# Patient Record
Sex: FEMALE | Race: WHITE | NOT HISPANIC OR LATINO
[De-identification: names, ages, dates, MRNs, and addresses within clinical notes are randomized per-mention and may not be internally consistent; named-entity substitution may affect disease eponyms.]

---

## 2017-01-01 ENCOUNTER — RX RENEWAL (OUTPATIENT)
Age: 77
End: 2017-01-01

## 2017-02-08 ENCOUNTER — RX RENEWAL (OUTPATIENT)
Age: 77
End: 2017-02-08

## 2017-02-28 ENCOUNTER — APPOINTMENT (OUTPATIENT)
Dept: ENDOCRINOLOGY | Facility: CLINIC | Age: 77
End: 2017-02-28

## 2017-02-28 VITALS
DIASTOLIC BLOOD PRESSURE: 70 MMHG | WEIGHT: 107.38 LBS | SYSTOLIC BLOOD PRESSURE: 116 MMHG | HEART RATE: 78 BPM | BODY MASS INDEX: 23.49 KG/M2 | HEIGHT: 56.5 IN

## 2017-02-28 LAB
GLUCOSE BLDC GLUCOMTR-MCNC: 193
HBA1C MFR BLD HPLC: 7.1

## 2017-03-27 ENCOUNTER — APPOINTMENT (OUTPATIENT)
Dept: ENDOCRINOLOGY | Facility: CLINIC | Age: 77
End: 2017-03-27

## 2017-07-07 ENCOUNTER — APPOINTMENT (OUTPATIENT)
Dept: ENDOCRINOLOGY | Facility: CLINIC | Age: 77
End: 2017-07-07

## 2017-07-07 VITALS
SYSTOLIC BLOOD PRESSURE: 120 MMHG | DIASTOLIC BLOOD PRESSURE: 80 MMHG | HEART RATE: 70 BPM | BODY MASS INDEX: 24.28 KG/M2 | HEIGHT: 56.5 IN | WEIGHT: 111 LBS

## 2017-07-07 LAB — GLUCOSE BLDC GLUCOMTR-MCNC: 191

## 2017-07-07 RX ORDER — SILVER SULFADIAZINE 10 MG/G
1 CREAM TOPICAL
Qty: 50 | Refills: 0 | Status: ACTIVE | COMMUNITY
Start: 2017-06-06

## 2017-08-07 ENCOUNTER — APPOINTMENT (OUTPATIENT)
Dept: ENDOCRINOLOGY | Facility: CLINIC | Age: 77
End: 2017-08-07
Payer: MEDICARE

## 2017-08-07 PROCEDURE — G0108 DIAB MANAGE TRN  PER INDIV: CPT

## 2017-10-17 ENCOUNTER — APPOINTMENT (OUTPATIENT)
Dept: ENDOCRINOLOGY | Facility: CLINIC | Age: 77
End: 2017-10-17
Payer: MEDICARE

## 2017-10-17 VITALS
SYSTOLIC BLOOD PRESSURE: 118 MMHG | DIASTOLIC BLOOD PRESSURE: 64 MMHG | HEART RATE: 68 BPM | HEIGHT: 56.5 IN | WEIGHT: 103 LBS | BODY MASS INDEX: 22.53 KG/M2

## 2017-10-17 DIAGNOSIS — R20.0 ANESTHESIA OF SKIN: ICD-10-CM

## 2017-10-17 DIAGNOSIS — R20.2 ANESTHESIA OF SKIN: ICD-10-CM

## 2017-10-17 LAB
GLUCOSE BLDC GLUCOMTR-MCNC: 153
HBA1C MFR BLD HPLC: 7.2

## 2017-10-17 PROCEDURE — 82962 GLUCOSE BLOOD TEST: CPT

## 2017-10-17 PROCEDURE — 83036 HEMOGLOBIN GLYCOSYLATED A1C: CPT | Mod: QW

## 2017-10-17 PROCEDURE — 99214 OFFICE O/P EST MOD 30 MIN: CPT | Mod: 25

## 2018-01-16 ENCOUNTER — APPOINTMENT (OUTPATIENT)
Dept: ENDOCRINOLOGY | Facility: CLINIC | Age: 78
End: 2018-01-16
Payer: MEDICARE

## 2018-01-16 VITALS
SYSTOLIC BLOOD PRESSURE: 116 MMHG | DIASTOLIC BLOOD PRESSURE: 60 MMHG | WEIGHT: 109 LBS | HEIGHT: 56.5 IN | BODY MASS INDEX: 23.84 KG/M2 | HEART RATE: 62 BPM

## 2018-01-16 DIAGNOSIS — R32 UNSPECIFIED URINARY INCONTINENCE: ICD-10-CM

## 2018-01-16 LAB — GLUCOSE BLDC GLUCOMTR-MCNC: 141

## 2018-01-16 PROCEDURE — 99214 OFFICE O/P EST MOD 30 MIN: CPT | Mod: 25

## 2018-01-16 PROCEDURE — 82962 GLUCOSE BLOOD TEST: CPT

## 2018-01-16 RX ORDER — NITROFURANTOIN (MONOHYDRATE/MACROCRYSTALS) 25; 75 MG/1; MG/1
100 CAPSULE ORAL
Refills: 0 | Status: DISCONTINUED | COMMUNITY
End: 2018-01-16

## 2018-03-05 ENCOUNTER — APPOINTMENT (OUTPATIENT)
Dept: ENDOCRINOLOGY | Facility: CLINIC | Age: 78
End: 2018-03-05

## 2018-04-17 ENCOUNTER — APPOINTMENT (OUTPATIENT)
Dept: ENDOCRINOLOGY | Facility: CLINIC | Age: 78
End: 2018-04-17
Payer: MEDICARE

## 2018-04-17 ENCOUNTER — LABORATORY RESULT (OUTPATIENT)
Age: 78
End: 2018-04-17

## 2018-04-17 VITALS
SYSTOLIC BLOOD PRESSURE: 161 MMHG | DIASTOLIC BLOOD PRESSURE: 73 MMHG | WEIGHT: 109 LBS | HEART RATE: 56 BPM | HEIGHT: 56.5 IN | BODY MASS INDEX: 23.84 KG/M2

## 2018-04-17 LAB — GLUCOSE BLDC GLUCOMTR-MCNC: 181

## 2018-04-17 PROCEDURE — 99213 OFFICE O/P EST LOW 20 MIN: CPT | Mod: 25

## 2018-04-17 PROCEDURE — 83036 HEMOGLOBIN GLYCOSYLATED A1C: CPT | Mod: QW

## 2018-04-17 PROCEDURE — 82962 GLUCOSE BLOOD TEST: CPT

## 2018-04-17 RX ORDER — BLOOD-GLUCOSE METER
W/DEVICE KIT MISCELLANEOUS
Qty: 1 | Refills: 0 | Status: DISCONTINUED | COMMUNITY
Start: 2018-04-17 | End: 2018-04-17

## 2018-04-19 LAB
25(OH)D3 SERPL-MCNC: 42.5 NG/ML
ALBUMIN SERPL ELPH-MCNC: 4.3 G/DL
ALP BLD-CCNC: 83 U/L
ALT SERPL-CCNC: 33 U/L
ANION GAP SERPL CALC-SCNC: 11 MMOL/L
APPEARANCE: CLEAR
AST SERPL-CCNC: 46 U/L
BACTERIA UR CULT: NORMAL
BASOPHILS # BLD AUTO: 0.03 K/UL
BASOPHILS NFR BLD AUTO: 0.4 %
BILIRUB SERPL-MCNC: 0.8 MG/DL
BILIRUBIN URINE: NEGATIVE
BLOOD URINE: NEGATIVE
BUN SERPL-MCNC: 13 MG/DL
CALCIUM SERPL-MCNC: 8.8 MG/DL
CHLORIDE SERPL-SCNC: 101 MMOL/L
CHOLEST SERPL-MCNC: 178 MG/DL
CHOLEST/HDLC SERPL: 4.8 RATIO
CO2 SERPL-SCNC: 25 MMOL/L
COLOR: YELLOW
CREAT SERPL-MCNC: 0.62 MG/DL
CREAT SPEC-SCNC: 46 MG/DL
EOSINOPHIL # BLD AUTO: 0.18 K/UL
EOSINOPHIL NFR BLD AUTO: 2.5 %
FOLATE SERPL-MCNC: 14.1 NG/ML
GLUCOSE QUALITATIVE U: NEGATIVE MG/DL
GLUCOSE SERPL-MCNC: 151 MG/DL
HCT VFR BLD CALC: 37.3 %
HDLC SERPL-MCNC: 37 MG/DL
HGB BLD-MCNC: 11.3 G/DL
IMM GRANULOCYTES NFR BLD AUTO: 0.1 %
KETONES URINE: NEGATIVE
LDLC SERPL CALC-MCNC: 118 MG/DL
LEUKOCYTE ESTERASE URINE: ABNORMAL
LYMPHOCYTES # BLD AUTO: 2.05 K/UL
LYMPHOCYTES NFR BLD AUTO: 29 %
MAN DIFF?: NORMAL
MCHC RBC-ENTMCNC: 25.9 PG
MCHC RBC-ENTMCNC: 30.3 GM/DL
MCV RBC AUTO: 85.6 FL
MICROALBUMIN 24H UR DL<=1MG/L-MCNC: 0.3 MG/DL
MICROALBUMIN/CREAT 24H UR-RTO: 7 MG/G
MONOCYTES # BLD AUTO: 0.41 K/UL
MONOCYTES NFR BLD AUTO: 5.8 %
NEUTROPHILS # BLD AUTO: 4.4 K/UL
NEUTROPHILS NFR BLD AUTO: 62.2 %
NITRITE URINE: POSITIVE
PH URINE: 6
PLATELET # BLD AUTO: 329 K/UL
POTASSIUM SERPL-SCNC: 4.8 MMOL/L
PROT SERPL-MCNC: 7.5 G/DL
PROTEIN URINE: NEGATIVE MG/DL
RBC # BLD: 4.36 M/UL
RBC # FLD: 15.2 %
SODIUM SERPL-SCNC: 137 MMOL/L
SPECIFIC GRAVITY URINE: 1.02
TRIGL SERPL-MCNC: 116 MG/DL
UROBILINOGEN URINE: 1 MG/DL
VIT B12 SERPL-MCNC: >2000 PG/ML
WBC # FLD AUTO: 7.08 K/UL

## 2018-04-24 ENCOUNTER — MEDICATION RENEWAL (OUTPATIENT)
Age: 78
End: 2018-04-24

## 2018-04-24 RX ORDER — BLOOD-GLUCOSE METER
W/DEVICE EACH MISCELLANEOUS
Qty: 1 | Refills: 0 | Status: ACTIVE | COMMUNITY
Start: 2018-04-24 | End: 1900-01-01

## 2018-04-24 RX ORDER — BLOOD-GLUCOSE METER
EACH MISCELLANEOUS
Qty: 1 | Refills: 0 | Status: DISCONTINUED | COMMUNITY
Start: 2018-04-17 | End: 2018-04-24

## 2018-04-24 RX ORDER — BLOOD SUGAR DIAGNOSTIC
STRIP MISCELLANEOUS
Qty: 200 | Refills: 5 | Status: ACTIVE | COMMUNITY
Start: 2018-04-24 | End: 1900-01-01

## 2018-05-01 ENCOUNTER — APPOINTMENT (OUTPATIENT)
Dept: ENDOCRINOLOGY | Facility: CLINIC | Age: 78
End: 2018-05-01
Payer: MEDICARE

## 2018-05-01 VITALS
SYSTOLIC BLOOD PRESSURE: 115 MMHG | BODY MASS INDEX: 23.3 KG/M2 | DIASTOLIC BLOOD PRESSURE: 62 MMHG | WEIGHT: 108 LBS | HEIGHT: 57 IN | OXYGEN SATURATION: 99 % | HEART RATE: 66 BPM

## 2018-05-01 LAB — GLUCOSE BLDC GLUCOMTR-MCNC: 305

## 2018-05-01 PROCEDURE — 95250 CONT GLUC MNTR PHYS/QHP EQP: CPT

## 2018-05-01 PROCEDURE — 95251 CONT GLUC MNTR ANALYSIS I&R: CPT

## 2018-05-01 PROCEDURE — 99214 OFFICE O/P EST MOD 30 MIN: CPT | Mod: 25

## 2018-05-09 ENCOUNTER — APPOINTMENT (OUTPATIENT)
Dept: ENDOCRINOLOGY | Facility: CLINIC | Age: 78
End: 2018-05-09
Payer: MEDICARE

## 2018-05-09 PROCEDURE — G0108 DIAB MANAGE TRN  PER INDIV: CPT

## 2018-05-09 RX ORDER — ALCOHOL ANTISEPTIC PADS
PADS, MEDICATED (EA) TOPICAL
Qty: 1 | Refills: 10 | Status: DISCONTINUED | COMMUNITY
Start: 2018-05-09 | End: 2018-05-09

## 2018-06-25 ENCOUNTER — RX RENEWAL (OUTPATIENT)
Age: 78
End: 2018-06-25

## 2018-06-26 ENCOUNTER — APPOINTMENT (OUTPATIENT)
Dept: ENDOCRINOLOGY | Facility: CLINIC | Age: 78
End: 2018-06-26
Payer: MEDICARE

## 2018-06-26 VITALS
HEART RATE: 56 BPM | WEIGHT: 108 LBS | SYSTOLIC BLOOD PRESSURE: 126 MMHG | OXYGEN SATURATION: 97 % | DIASTOLIC BLOOD PRESSURE: 73 MMHG | HEIGHT: 57.5 IN | BODY MASS INDEX: 22.98 KG/M2

## 2018-06-26 DIAGNOSIS — G25.81 RESTLESS LEGS SYNDROME: ICD-10-CM

## 2018-06-26 LAB
GLUCOSE BLDC GLUCOMTR-MCNC: 177
HBA1C MFR BLD HPLC: 6.9

## 2018-06-26 PROCEDURE — 82962 GLUCOSE BLOOD TEST: CPT

## 2018-06-26 PROCEDURE — 99214 OFFICE O/P EST MOD 30 MIN: CPT | Mod: 25

## 2018-06-26 PROCEDURE — 83036 HEMOGLOBIN GLYCOSYLATED A1C: CPT | Mod: QW

## 2018-08-13 ENCOUNTER — RX RENEWAL (OUTPATIENT)
Age: 78
End: 2018-08-13

## 2018-10-04 ENCOUNTER — APPOINTMENT (OUTPATIENT)
Dept: ENDOCRINOLOGY | Facility: CLINIC | Age: 78
End: 2018-10-04
Payer: MEDICARE

## 2018-10-04 VITALS
BODY MASS INDEX: 23.51 KG/M2 | HEART RATE: 66 BPM | OXYGEN SATURATION: 97 % | WEIGHT: 109 LBS | DIASTOLIC BLOOD PRESSURE: 75 MMHG | HEIGHT: 57 IN | SYSTOLIC BLOOD PRESSURE: 161 MMHG

## 2018-10-04 LAB — GLUCOSE BLDC GLUCOMTR-MCNC: 187

## 2018-10-04 PROCEDURE — 82962 GLUCOSE BLOOD TEST: CPT

## 2018-10-04 PROCEDURE — 99214 OFFICE O/P EST MOD 30 MIN: CPT | Mod: 25

## 2018-12-22 ENCOUNTER — MOBILE ON CALL (OUTPATIENT)
Age: 78
End: 2018-12-22

## 2019-01-07 ENCOUNTER — APPOINTMENT (OUTPATIENT)
Dept: ENDOCRINOLOGY | Facility: CLINIC | Age: 79
End: 2019-01-07
Payer: MEDICARE

## 2019-01-07 VITALS
BODY MASS INDEX: 21.64 KG/M2 | WEIGHT: 100 LBS | DIASTOLIC BLOOD PRESSURE: 55 MMHG | SYSTOLIC BLOOD PRESSURE: 107 MMHG | HEART RATE: 54 BPM

## 2019-01-07 VITALS
DIASTOLIC BLOOD PRESSURE: 55 MMHG | SYSTOLIC BLOOD PRESSURE: 107 MMHG | HEART RATE: 54 BPM | WEIGHT: 100 LBS | BODY MASS INDEX: 21.64 KG/M2

## 2019-01-07 DIAGNOSIS — D64.9 ANEMIA, UNSPECIFIED: ICD-10-CM

## 2019-01-07 LAB
GLUCOSE BLDC GLUCOMTR-MCNC: 115
HBA1C MFR BLD HPLC: 6.9

## 2019-01-07 PROCEDURE — 83036 HEMOGLOBIN GLYCOSYLATED A1C: CPT | Mod: QW

## 2019-01-07 PROCEDURE — 82962 GLUCOSE BLOOD TEST: CPT

## 2019-01-07 PROCEDURE — 99214 OFFICE O/P EST MOD 30 MIN: CPT | Mod: 25

## 2019-01-07 NOTE — ASSESSMENT
[FreeTextEntry1] : Again we discussed how she is doing very well in her diabetic management.She has lost some weight since her  passed away and she has been feeling more anxious. We did discuss possibly having a part-time Aid at home. We will arrange for her to have a home draw for fasting labs. i have suggested she routinely have a  nighttime snack.  [Carbohydrate Consistent Diet] : carbohydrate consistent diet [Hypoglycemia Management] : hypoglycemia management [Diabetes Foot Care] : diabetes foot care [Importance of Diet and Exercise] : importance of diet and exercise to improve glycemic control, achieve weight loss and improve cardiovascular health [Self Monitoring of Blood Glucose] : self monitoring of blood glucose

## 2019-01-07 NOTE — HISTORY OF PRESENT ILLNESS
[FreeTextEntry1] : Patient with a history of post pancreatic surgery and type 1 diabetes. She is presently using Lantus 10 u daily and Novolog sliding scale before meals. She reports occasionally feeling shaky but has not checked her sugars during those times. She does report some low fasting blood sugar readings but denies any symptoms at those times. Her A1C in now 6.9. her  recently passed away and she has lost weight.   She continues to see a rheumatologist, who is giving her Prolia injections. ( however she admits to not going back for awhile). ).She continues to feel weak and uses a walker to ambulate. \par \par \par \par \par \par \par \par \par \par \par \par \par \par \par \par \par \par \par \par

## 2019-01-07 NOTE — PHYSICAL EXAM
[Alert] : alert [No Acute Distress] : no acute distress [Well Nourished] : well nourished [Normal Sclera/Conjunctiva] : normal sclera/conjunctiva [PERRL] : pupils equal, round and reactive to light [EOMI] : extra ocular movement intact [Normal Oropharynx] : the oropharynx was normal [Thyroid Not Enlarged] : the thyroid was not enlarged [Clear to Auscultation] : lungs were clear to auscultation bilaterally [Normal Rate] : heart rate was normal  [Normal S1, S2] : normal S1 and S2 [Regular Rhythm] : with a regular rhythm [Normal Bowel Sounds] : normal bowel sounds [Not Tender] : non-tender [No CVA Tenderness] : no ~M costovertebral angle tenderness [No Joint Swelling] : no joint swelling seen [No Rash] : no rash [Normal Reflexes] : deep tendon reflexes were 2+ and symmetric [No Tremors] : no tremors [Oriented x3] : oriented to person, place, and time [de-identified] : Overall seems weaker and more confused [de-identified] : Trace edema in both ankles [de-identified] : scars over abdomen [de-identified] : Using a walker, decreased muscle strength of the legs as noted by her trying to get on the examining table

## 2019-01-12 ENCOUNTER — RX RENEWAL (OUTPATIENT)
Age: 79
End: 2019-01-12

## 2019-03-09 ENCOUNTER — MEDICATION RENEWAL (OUTPATIENT)
Age: 79
End: 2019-03-09

## 2019-04-09 ENCOUNTER — APPOINTMENT (OUTPATIENT)
Dept: ENDOCRINOLOGY | Facility: CLINIC | Age: 79
End: 2019-04-09

## 2019-06-25 ENCOUNTER — MOBILE ON CALL (OUTPATIENT)
Age: 79
End: 2019-06-25

## 2019-10-03 ENCOUNTER — MEDICATION RENEWAL (OUTPATIENT)
Age: 79
End: 2019-10-03

## 2019-10-04 RX ORDER — BLOOD SUGAR DIAGNOSTIC
STRIP MISCELLANEOUS
Qty: 200 | Refills: 5 | Status: ACTIVE | COMMUNITY
Start: 2019-01-12 | End: 1900-01-01

## 2019-10-23 ENCOUNTER — APPOINTMENT (OUTPATIENT)
Dept: ENDOCRINOLOGY | Facility: CLINIC | Age: 79
End: 2019-10-23
Payer: MEDICARE

## 2019-10-23 VITALS
BODY MASS INDEX: 20.71 KG/M2 | HEART RATE: 91 BPM | WEIGHT: 96 LBS | OXYGEN SATURATION: 95 % | HEIGHT: 57 IN | SYSTOLIC BLOOD PRESSURE: 104 MMHG | DIASTOLIC BLOOD PRESSURE: 69 MMHG

## 2019-10-23 DIAGNOSIS — F32.9 ANXIETY DISORDER, UNSPECIFIED: ICD-10-CM

## 2019-10-23 DIAGNOSIS — F41.9 ANXIETY DISORDER, UNSPECIFIED: ICD-10-CM

## 2019-10-23 LAB
GLUCOSE BLDC GLUCOMTR-MCNC: 408
HBA1C MFR BLD HPLC: 8.7

## 2019-10-23 PROCEDURE — 83036 HEMOGLOBIN GLYCOSYLATED A1C: CPT | Mod: QW

## 2019-10-23 PROCEDURE — 99215 OFFICE O/P EST HI 40 MIN: CPT | Mod: 25

## 2019-10-23 PROCEDURE — 82962 GLUCOSE BLOOD TEST: CPT

## 2019-10-23 RX ORDER — ATORVASTATIN CALCIUM 80 MG/1
80 TABLET, FILM COATED ORAL
Refills: 0 | Status: ACTIVE | COMMUNITY

## 2019-10-23 RX ORDER — OXYBUTYNIN CHLORIDE 5 MG/1
5 TABLET, EXTENDED RELEASE ORAL
Refills: 0 | Status: DISCONTINUED | COMMUNITY
End: 2019-10-23

## 2019-10-23 RX ORDER — MULTIVIT-MIN/IRON FUM/FOLIC AC 7.5 MG-4
TABLET ORAL
Refills: 0 | Status: ACTIVE | COMMUNITY

## 2019-10-23 RX ORDER — CALCIUM CARBONATE 500(1250)
500 TABLET ORAL
Refills: 0 | Status: ACTIVE | COMMUNITY

## 2019-10-23 RX ORDER — TICAGRELOR 90 MG/1
90 TABLET ORAL
Refills: 0 | Status: ACTIVE | COMMUNITY

## 2019-10-23 RX ORDER — SERTRALINE 25 MG/1
25 TABLET, FILM COATED ORAL
Refills: 0 | Status: ACTIVE | COMMUNITY

## 2019-10-23 NOTE — HISTORY OF PRESENT ILLNESS
[FreeTextEntry1] : Patient with a history of post pancreatic surgery and type 1 diabetes. She presents to the office with her daughters and HHA after 9 months. She was living with her son in [facility] for 8 months where she was seeing another Endocrinologist. Her Lantus was lowered to 6 u. She was also given a different sliding scale for her Humalog, they were instructed to check the sugar after she ate and was told to give it after meals. Her A1C is 8.7, which is high compared to what she usually used to run. She continues to feel shaky. \par She had a blood test with her PCP last week. Her daughter will have them fax it over. \par Today her BS is 408. She had eaten over 3 hours ago and took insulin. A log shows her BS have been running very high. \par Patient is now using a wheelchair.

## 2019-10-23 NOTE — PHYSICAL EXAM
[Alert] : alert [No Acute Distress] : no acute distress [Normal Sclera/Conjunctiva] : normal sclera/conjunctiva [PERRL] : pupils equal, round and reactive to light [Normal Outer Ear/Nose] : the ears and nose were normal in appearance [Normal Hearing] : hearing was normal [No Neck Mass] : no neck mass was observed [Supple] : the neck was supple [Thyroid Not Enlarged] : the thyroid was not enlarged [Normal Rate] : heart rate was normal  [Normal S1, S2] : normal S1 and S2 [Regular Rhythm] : with a regular rhythm [Normal Bowel Sounds] : normal bowel sounds [Not Tender] : non-tender [No Edema] : there was no peripheral edema [Soft] : abdomen soft [No Rash] : no rash [No Skin Lesions] : no skin lesions [Cranial Nerves Intact] : cranial nerves 2-12 were intact [Normal Reflexes] : deep tendon reflexes were 2+ and symmetric [de-identified] : umbilical hernia noted

## 2019-10-23 NOTE — ASSESSMENT
[Glucagon Use] : glucagon use [Hypoglycemia Management] : hypoglycemia management [Carbohydrate Consistent Diet] : carbohydrate consistent diet [Diabetes Foot Care] : diabetes foot care [Long Term Vascular Complications] : long term vascular complications of diabetes [Action and use of Insulin] : action and use of short and long-acting insulin [Importance of Diet and Exercise] : importance of diet and exercise to improve glycemic control, achieve weight loss and improve cardiovascular health [Self Monitoring of Blood Glucose] : self monitoring of blood glucose [Insulin Self-Administration] : insulin self-administration [Injection Technique, Storage, Sharps Disposal] : injection technique, storage, and sharps disposal [Retinopathy Screening] : Patient was referred to ophthalmology for retinopathy screening [FreeTextEntry1] : Patient with moderately controlled type 1 diabetes. She will resume her old regimen. Lantus 10 u and Humalog sliding scale. We spoke about the proper way to check a blood sugar and dose insulin. We discussed the dangers of not doing it this. THey will keep a close eye on her BS. We did discuss a CGM but the family would like to hold off for now. \par A lot of time was spent educating the patient's caregivers. This is all new to them as the patient used to previously manage her own insulin. They had many questions regarding insulin administration and diabetes. \par \par

## 2019-11-12 ENCOUNTER — APPOINTMENT (OUTPATIENT)
Dept: ENDOCRINOLOGY | Facility: CLINIC | Age: 79
End: 2019-11-12
Payer: MEDICARE

## 2019-11-12 VITALS
SYSTOLIC BLOOD PRESSURE: 118 MMHG | HEIGHT: 57 IN | HEART RATE: 60 BPM | DIASTOLIC BLOOD PRESSURE: 59 MMHG | WEIGHT: 96 LBS | OXYGEN SATURATION: 91 % | BODY MASS INDEX: 20.71 KG/M2

## 2019-11-12 LAB — GLUCOSE BLDC GLUCOMTR-MCNC: 189

## 2019-11-12 PROCEDURE — 82962 GLUCOSE BLOOD TEST: CPT

## 2019-11-12 PROCEDURE — 99214 OFFICE O/P EST MOD 30 MIN: CPT | Mod: 25

## 2019-11-12 RX ORDER — LISINOPRIL 2.5 MG/1
2.5 TABLET ORAL
Refills: 0 | Status: DISCONTINUED | COMMUNITY
End: 2019-11-12

## 2019-11-21 NOTE — PHYSICAL EXAM
[Alert] : alert [No Acute Distress] : no acute distress [Normal Sclera/Conjunctiva] : normal sclera/conjunctiva [PERRL] : pupils equal, round and reactive to light [Normal Outer Ear/Nose] : the ears and nose were normal in appearance [Normal Hearing] : hearing was normal [No Neck Mass] : no neck mass was observed [Supple] : the neck was supple [Thyroid Not Enlarged] : the thyroid was not enlarged [Normal Rate] : heart rate was normal  [Normal S1, S2] : normal S1 and S2 [Regular Rhythm] : with a regular rhythm [No Edema] : there was no peripheral edema [Normal Bowel Sounds] : normal bowel sounds [Soft] : abdomen soft [Not Tender] : non-tender [No Rash] : no rash [No Skin Lesions] : no skin lesions [Cranial Nerves Intact] : cranial nerves 2-12 were intact [Normal Reflexes] : deep tendon reflexes were 2+ and symmetric [de-identified] : umbilical hernia noted

## 2019-11-21 NOTE — ASSESSMENT
[Carbohydrate Consistent Diet] : carbohydrate consistent diet [Long Term Vascular Complications] : long term vascular complications of diabetes [Diabetes Foot Care] : diabetes foot care [Hypoglycemia Management] : hypoglycemia management [Action and use of Insulin] : action and use of short and long-acting insulin [Importance of Diet and Exercise] : importance of diet and exercise to improve glycemic control, achieve weight loss and improve cardiovascular health [Self Monitoring of Blood Glucose] : self monitoring of blood glucose [Insulin Self-Administration] : insulin self-administration [Retinopathy Screening] : Patient was referred to ophthalmology for retinopathy screening [Injection Technique, Storage, Sharps Disposal] : injection technique, storage, and sharps disposal [FreeTextEntry1] : Patient with moderately controlled type 1 diabetes. They will increase to Lantus 10 u and continue the Humalog sliding scale. A professional sensor was placed on the patient to help evaluate her BS and insulin demand. In addition, the patient will evaluate whether she likes wearing a CGM. If she does we will order her, her own sensor. \par A lot of time was spent educating the patient's caregivers. This is all new to them as the patient used to previously manage her own insulin. They had many questions regarding insulin administration and diabetes. \par \par

## 2019-11-21 NOTE — HISTORY OF PRESENT ILLNESS
[FreeTextEntry1] : Patient with a history of post pancreatic surgery and type 1 diabetes. She presents to the office with her daughters and HHA after 9 months. She was living with her son in [facility] for 8 months where she was seeing another Endocrinologist. Her Lantus was lowered to 6 u. She was also given a different sliding scale for her Humalog, they were instructed to check the sugar after she ate and was told to give it after meals. Her A1C is 8.7, which is high compared to what she usually used to run. She continues to feel shaky. \par Patient is now using a wheelchair. \par \par Since her last visit, she is now on Lantus 8 u and using the old Humalog scale. THey have been checking her BS before meals. Her BS seem to be improving but at times still remain high. \par \par Patient does have dementia and is presently living and being cared for by family. Her daughter Fuad ([phone]) is the person to speak to regarding all matters.

## 2019-11-26 ENCOUNTER — APPOINTMENT (OUTPATIENT)
Dept: ENDOCRINOLOGY | Facility: CLINIC | Age: 79
End: 2019-11-26
Payer: MEDICARE

## 2019-11-26 VITALS
HEART RATE: 65 BPM | WEIGHT: 95 LBS | SYSTOLIC BLOOD PRESSURE: 140 MMHG | HEIGHT: 57 IN | BODY MASS INDEX: 20.49 KG/M2 | DIASTOLIC BLOOD PRESSURE: 67 MMHG

## 2019-11-26 LAB — GLUCOSE BLDC GLUCOMTR-MCNC: 164

## 2019-11-26 PROCEDURE — 95251 CONT GLUC MNTR ANALYSIS I&R: CPT

## 2019-11-26 PROCEDURE — 95250 CONT GLUC MNTR PHYS/QHP EQP: CPT

## 2019-11-26 PROCEDURE — 99214 OFFICE O/P EST MOD 30 MIN: CPT | Mod: 25

## 2019-11-26 NOTE — REASON FOR VISIT
[Formal Caregiver] : formal caregiver [Follow-Up: _____] : a [unfilled] follow-up visit [Family Member] : family member

## 2019-12-09 ENCOUNTER — MEDICATION RENEWAL (OUTPATIENT)
Age: 79
End: 2019-12-09

## 2019-12-10 NOTE — PHYSICAL EXAM
[Alert] : alert [Normal Sclera/Conjunctiva] : normal sclera/conjunctiva [No Acute Distress] : no acute distress [Normal Outer Ear/Nose] : the ears and nose were normal in appearance [Normal Hearing] : hearing was normal [PERRL] : pupils equal, round and reactive to light [No Neck Mass] : no neck mass was observed [Thyroid Not Enlarged] : the thyroid was not enlarged [Supple] : the neck was supple [Normal Rate] : heart rate was normal  [Normal S1, S2] : normal S1 and S2 [Regular Rhythm] : with a regular rhythm [No Edema] : there was no peripheral edema [Normal Bowel Sounds] : normal bowel sounds [Not Tender] : non-tender [Soft] : abdomen soft [No Skin Lesions] : no skin lesions [No Rash] : no rash [Cranial Nerves Intact] : cranial nerves 2-12 were intact [Normal Reflexes] : deep tendon reflexes were 2+ and symmetric [de-identified] : umbilical hernia noted

## 2019-12-10 NOTE — ASSESSMENT
[Carbohydrate Consistent Diet] : carbohydrate consistent diet [Hypoglycemia Management] : hypoglycemia management [Glucagon Use] : glucagon use [Importance of Diet and Exercise] : importance of diet and exercise to improve glycemic control, achieve weight loss and improve cardiovascular health [Long Term Vascular Complications] : long term vascular complications of diabetes [Diabetes Foot Care] : diabetes foot care [Action and use of Insulin] : action and use of short and long-acting insulin [Self Monitoring of Blood Glucose] : self monitoring of blood glucose [Injection Technique, Storage, Sharps Disposal] : injection technique, storage, and sharps disposal [Insulin Self-Administration] : insulin self-administration [FreeTextEntry1] : Patient with moderately controlled type 1 diabetes. The sensor tracing reveals she is in target range 43%. She is above target 40% of which 15% is above 250. She is below 70, 17% of which 7% is below 54. Her CV is 53.7%. She was having overnight lows but once Lantus was lowered they improved. Her BS during the day are significantly high due to the carb content of her meals. I have explained that when she eats a larger carb meal they can give her an extra unit of insulin. A personal sensor was ordered, once received the family will call to schedule an appt to be trained. \par A lot of time was spent educating the patient's caregivers. This is all new to them as the patient used to previously manage her own insulin. They had many questions regarding insulin administration and diabetes. \par \par

## 2020-02-11 ENCOUNTER — APPOINTMENT (OUTPATIENT)
Dept: ENDOCRINOLOGY | Facility: CLINIC | Age: 80
End: 2020-02-11
Payer: MEDICARE

## 2020-02-11 VITALS — SYSTOLIC BLOOD PRESSURE: 121 MMHG | DIASTOLIC BLOOD PRESSURE: 71 MMHG | HEART RATE: 73 BPM | OXYGEN SATURATION: 94 %

## 2020-02-11 LAB
GLUCOSE BLDC GLUCOMTR-MCNC: 447
HBA1C MFR BLD HPLC: 7.9

## 2020-02-11 PROCEDURE — 83036 HEMOGLOBIN GLYCOSYLATED A1C: CPT | Mod: QW

## 2020-02-11 PROCEDURE — 82962 GLUCOSE BLOOD TEST: CPT

## 2020-02-11 PROCEDURE — 99214 OFFICE O/P EST MOD 30 MIN: CPT | Mod: 25

## 2020-02-11 RX ORDER — GLUCAGON 3 MG/1
3 POWDER NASAL
Qty: 1 | Refills: 2 | Status: ACTIVE | COMMUNITY
Start: 2020-02-11 | End: 1900-01-01

## 2020-02-11 NOTE — ASSESSMENT
[Carbohydrate Consistent Diet] : carbohydrate consistent diet [Hypoglycemia Management] : hypoglycemia management [Diabetes Foot Care] : diabetes foot care [Long Term Vascular Complications] : long term vascular complications of diabetes [Self Monitoring of Blood Glucose] : self monitoring of blood glucose [Importance of Diet and Exercise] : importance of diet and exercise to improve glycemic control, achieve weight loss and improve cardiovascular health [Retinopathy Screening] : Patient was referred to ophthalmology for retinopathy screening [FreeTextEntry1] : Type 1 diabetes. Basically her sensor shows low in am and high during the day. \par Sensor: TIR 45 % over 250 37 % low 7 % mostly over night ; will try to take Lantus 8 u closer to bedtime or consider change to Tresiba or am Lantus. \par continue Humalog scale but do before meals, and no lower than 5 u in am. Will start to factor in rate of change arrows, if rapidly rising add 1-2 u or falling subtract 1- 2u. \par Discussed with daughter and daughter in law. \par Will call for recent BD report.

## 2020-02-11 NOTE — PHYSICAL EXAM
[Alert] : alert [Normal Sclera/Conjunctiva] : normal sclera/conjunctiva [Well Nourished] : well nourished [No Acute Distress] : no acute distress [PERRL] : pupils equal, round and reactive to light [Normal Oropharynx] : the oropharynx was normal [EOMI] : extra ocular movement intact [Thyroid Not Enlarged] : the thyroid was not enlarged [Clear to Auscultation] : lungs were clear to auscultation bilaterally [Normal S1, S2] : normal S1 and S2 [Normal Rate] : heart rate was normal  [Regular Rhythm] : with a regular rhythm [Normal Bowel Sounds] : normal bowel sounds [Not Tender] : non-tender [No CVA Tenderness] : no ~M costovertebral angle tenderness [No Joint Swelling] : no joint swelling seen [No Rash] : no rash [No Tremors] : no tremors [Normal Reflexes] : deep tendon reflexes were 2+ and symmetric [Oriented x3] : oriented to person, place, and time [de-identified] : Overall seems weaker and more confused [de-identified] : Trace edema in both ankles [de-identified] : scars over abdomen [de-identified] : Using a walker, decreased muscle strength of the legs as noted by her trying to get on the examining table

## 2020-02-11 NOTE — HISTORY OF PRESENT ILLNESS
[FreeTextEntry1] : Patient with a history of post pancreatic surgery and type 1 diabetes. She is presently using Lantus 8 u daily and Humalog  sliding scale before meals. She is now wearing a Dennis sensor. \par She was seeing a rheumatologist, who was giving her Prolia injections but now receives it from her PCP. . She is now in a wheel chair. \par \par \par \par \par \par \par \par \par \par \par \par \par \par \par \par \par

## 2020-03-17 ENCOUNTER — APPOINTMENT (OUTPATIENT)
Dept: ENDOCRINOLOGY | Facility: CLINIC | Age: 80
End: 2020-03-17

## 2020-04-12 ENCOUNTER — RX RENEWAL (OUTPATIENT)
Age: 80
End: 2020-04-12

## 2020-06-17 ENCOUNTER — RX RENEWAL (OUTPATIENT)
Age: 80
End: 2020-06-17

## 2020-07-27 ENCOUNTER — RX RENEWAL (OUTPATIENT)
Age: 80
End: 2020-07-27

## 2020-08-19 ENCOUNTER — APPOINTMENT (OUTPATIENT)
Dept: ENDOCRINOLOGY | Facility: CLINIC | Age: 80
End: 2020-08-19
Payer: MEDICARE

## 2020-08-19 PROCEDURE — 99213 OFFICE O/P EST LOW 20 MIN: CPT | Mod: 95

## 2020-08-19 NOTE — ASSESSMENT
[FreeTextEntry1] : Type 1 diabetes.sensor data not available today. Overall sugars seem stable as per daughter She has labs at PCP. \par Will call back when have data from sensor and labs.  [Long Term Vascular Complications] : long term vascular complications of diabetes [Diabetes Foot Care] : diabetes foot care [Carbohydrate Consistent Diet] : carbohydrate consistent diet [Hypoglycemia Management] : hypoglycemia management [Retinopathy Screening] : Patient was referred to ophthalmology for retinopathy screening [Self Monitoring of Blood Glucose] : self monitoring of blood glucose [Diabetic Medications] : Risks and benefits of diabetic medications were discussed

## 2020-08-19 NOTE — HISTORY OF PRESENT ILLNESS
[Home] : at home, [unfilled] , at the time of the visit. [Medical Office: ([facility])___] : at the medical office located in  [Family Member] : family member [Verbal consent obtained from patient] : the patient, [unfilled] [FreeTextEntry3] : daughter [FreeTextEntry1] : Patient with a history of post pancreatic surgery and type 1 diabetes. She is presently using Lantus 8 u daily and Humalog  sliding scale before meals. She is now wearing a Dennis sensor. \par She was seeing a rheumatologist, who was giving her Prolia injections but now receives it from her PCP. . She is now in a wheel chair. \par \par \par \par \par \par \par \par \par \par \par \par \par \par \par \par \par

## 2020-08-19 NOTE — PHYSICAL EXAM
[Alert] : alert [Normal Voice/Communication] : normal voice communication [No Acute Distress] : no acute distress [Normal Sclera/Conjunctiva] : normal sclera/conjunctiva [No Respiratory Distress] : no respiratory distress [Thyroid Not Enlarged] : the thyroid was not enlarged [Oriented x3] : oriented to person, place, and time [Normal Insight/Judgement] : insight and judgment were intact [de-identified] : .covid

## 2020-10-27 ENCOUNTER — APPOINTMENT (OUTPATIENT)
Dept: ENDOCRINOLOGY | Facility: CLINIC | Age: 80
End: 2020-10-27
Payer: MEDICARE

## 2020-10-27 PROCEDURE — 99213 OFFICE O/P EST LOW 20 MIN: CPT | Mod: 25,95

## 2020-10-27 NOTE — HISTORY OF PRESENT ILLNESS
[Home] : at home, [unfilled] , at the time of the visit. [Medical Office: ([facility])___] : at the medical office located in  [Family Member] : family member [Formal Caregiver] : formal caregiver [Verbal consent obtained from patient] : the patient, [unfilled] [FreeTextEntry1] : Patient with a history of post pancreatic surgery and type 1 diabetes. She is presently using Lantus 8 u daily and Humalog  sliding scale before meals. She is now wearing a Dennis sensor.  Mostly she takes 6 u of Humalog at BF since always high by lunch. She can drop low overnight so they lowered Lantus to 6 u. \par She was seeing a rheumatologist, who was giving her Prolia injections but now receives it from her PCP. . She is now in a wheel chair. \par According to daughter she mostly eats carbs hard to change her. \par \par \par \par \par \par \par \par \par \par \par \par \par \par \par \par  [Continuous Glucose Monitoring] : Continuous Glucose Monitoring: Yes [Dennis] : Dennis [FreeTextEntry2] : 58 [FreeTextEntry3] : 23 [FreeTextEntry4] : 3 [de-identified] : 7.1 [FreeTextEntry5] : 159 [FreeTextEntry6] : 45.8

## 2020-10-27 NOTE — ASSESSMENT
[FreeTextEntry1] : Type 1 diabetes.- sensor shows lows overnight and significant spike AB as well as later in the day. She will change Lantus back to 8 u but take in am. uses Humalog scale but even when low in am will use 6 u Humalog just have 4 oz OJ first. We do need to avoid lows overnight so will see if changing timing of Lantus helps. If sugar over 250 can use 7-8 u Humalog. Will f/u one month and also meet with RD/CDE.  [Diabetes Foot Care] : diabetes foot care [Long Term Vascular Complications] : long term vascular complications of diabetes [Carbohydrate Consistent Diet] : carbohydrate consistent diet [Importance of Diet and Exercise] : importance of diet and exercise to improve glycemic control, achieve weight loss and improve cardiovascular health [Hypoglycemia Management] : hypoglycemia management [Self Monitoring of Blood Glucose] : self monitoring of blood glucose [Retinopathy Screening] : Patient was referred to ophthalmology for retinopathy screening

## 2020-10-27 NOTE — PHYSICAL EXAM
[Alert] : alert [No Acute Distress] : no acute distress [Normal Voice/Communication] : normal voice communication [Normal Sclera/Conjunctiva] : normal sclera/conjunctiva [Thyroid Not Enlarged] : the thyroid was not enlarged [No Respiratory Distress] : no respiratory distress [Oriented x3] : oriented to person, place, and time [Normal Insight/Judgement] : insight and judgment were intact [de-identified] : Limited exam due to Telehealth visit secondary to Covid pandemic

## 2020-12-09 ENCOUNTER — APPOINTMENT (OUTPATIENT)
Dept: ENDOCRINOLOGY | Facility: CLINIC | Age: 80
End: 2020-12-09
Payer: MEDICARE

## 2020-12-09 PROCEDURE — 99213 OFFICE O/P EST LOW 20 MIN: CPT | Mod: 25,95

## 2020-12-09 NOTE — PHYSICAL EXAM
[Alert] : alert [No Acute Distress] : no acute distress [Normal Voice/Communication] : normal voice communication [Normal Sclera/Conjunctiva] : normal sclera/conjunctiva [No Respiratory Distress] : no respiratory distress [Oriented x3] : oriented to person, place, and time [Normal Insight/Judgement] : insight and judgment were intact [de-identified] : Limited exam due to Telehealth visit secondary to Covid pandemic

## 2020-12-09 NOTE — ASSESSMENT
[FreeTextEntry1] : Type 1 diabetes.- sensor shows lows overnight and significant spike AB as well as later in the day. She will remain on Lantus 6- 8 u in am. uses Seems to have most PP spikes AL so will add 1-2 u to scale at lunch and since can drop low overnight will decrease Humalog dose based on scale at dinner by 1-2 u. Give HS snack, 2 tea cookies. \par f/u 2 months ; reconsider meeting with RD/CDE. needs to help focus on diet.  [Diabetes Foot Care] : diabetes foot care [Long Term Vascular Complications] : long term vascular complications of diabetes [Carbohydrate Consistent Diet] : carbohydrate consistent diet [Importance of Diet and Exercise] : importance of diet and exercise to improve glycemic control, achieve weight loss and improve cardiovascular health [Hypoglycemia Management] : hypoglycemia management [Self Monitoring of Blood Glucose] : self monitoring of blood glucose [Retinopathy Screening] : Patient was referred to ophthalmology for retinopathy screening

## 2021-04-22 ENCOUNTER — APPOINTMENT (OUTPATIENT)
Dept: ENDOCRINOLOGY | Facility: CLINIC | Age: 81
End: 2021-04-22
Payer: MEDICARE

## 2021-04-22 PROCEDURE — 99213 OFFICE O/P EST LOW 20 MIN: CPT | Mod: 25,95

## 2021-04-22 NOTE — REASON FOR VISIT
[Home] : at home, [unfilled] , at the time of the visit. [Medical Office: ([facility])___] : at the medical office located in  [Verbal consent obtained from patient] : the patient, [unfilled] [DM Type 1] : DM Type 1 [Family Member] : family member

## 2021-04-22 NOTE — HISTORY OF PRESENT ILLNESS
[FreeTextEntry1] : Patient with a history of post pancreatic surgery and type 1 diabetes. She is presently using Lantus 8 u daily and Humalog  sliding scale before meals. She is now wearing a Dennis sensor.  Mostly she takes 6 u of Humalog at BF since always high by lunch. She can drop low overnight so they lowered Lantus to 6-8  u. \par She was seeing a rheumatologist, who was giving her Prolia injections but now receives it from her PCP. . She is now in a wheel chair. \par According to daughter she mostly eats carbs hard to change her. \par \par \par \par \par \par \par \par \par \par \par \par \par \par \par \par  [Continuous Glucose Monitoring] : Continuous Glucose Monitoring: Yes [Dennis] : Dennis [FreeTextEntry2] : 60 [FreeTextEntry3] : 15 [FreeTextEntry4] : 24 [de-identified] : 7.4 [FreeTextEntry5] : 173 [FreeTextEntry6] : 48.1

## 2021-04-22 NOTE — PHYSICAL EXAM
[Alert] : alert [No Acute Distress] : no acute distress [Normal Voice/Communication] : normal voice communication [Normal Sclera/Conjunctiva] : normal sclera/conjunctiva [No Respiratory Distress] : no respiratory distress [Oriented x3] : oriented to person, place, and time [Normal Insight/Judgement] : insight and judgment were intact [de-identified] : Limited exam due to Telehealth visit secondary to Covid pandemic

## 2021-04-22 NOTE — ASSESSMENT
[FreeTextEntry1] : Type 1 diabetes.- sensor shows lows overnight and significant spike AB as well as later in the day. She will remain on Lantus 6- 8 u in am. To have HS snack. Seems to have most PP spikes AL so will add 1-2 u to scale at BF check 1-2 hour PP,  and since can drop low overnight will decrease Humalog dose based on scale at dinner by 1-2 u.  \par f/u 2 months ; reconsider meeting with RD/CDE. needs to help focus on diet.  [Diabetes Foot Care] : diabetes foot care [Long Term Vascular Complications] : long term vascular complications of diabetes [Carbohydrate Consistent Diet] : carbohydrate consistent diet [Importance of Diet and Exercise] : importance of diet and exercise to improve glycemic control, achieve weight loss and improve cardiovascular health [Hypoglycemia Management] : hypoglycemia management [Retinopathy Screening] : Patient was referred to ophthalmology for retinopathy screening

## 2021-06-30 ENCOUNTER — RX RENEWAL (OUTPATIENT)
Age: 81
End: 2021-06-30

## 2021-09-27 ENCOUNTER — RX RENEWAL (OUTPATIENT)
Age: 81
End: 2021-09-27

## 2021-12-02 ENCOUNTER — RX RENEWAL (OUTPATIENT)
Age: 81
End: 2021-12-02

## 2022-01-03 ENCOUNTER — APPOINTMENT (OUTPATIENT)
Dept: ENDOCRINOLOGY | Facility: CLINIC | Age: 82
End: 2022-01-03
Payer: MEDICARE

## 2022-01-03 DIAGNOSIS — E55.9 VITAMIN D DEFICIENCY, UNSPECIFIED: ICD-10-CM

## 2022-01-03 DIAGNOSIS — I10 ESSENTIAL (PRIMARY) HYPERTENSION: ICD-10-CM

## 2022-01-03 DIAGNOSIS — M81.0 AGE-RELATED OSTEOPOROSIS W/OUT CURRENT PATHOLOGICAL FRACTURE: ICD-10-CM

## 2022-01-03 DIAGNOSIS — E78.5 HYPERLIPIDEMIA, UNSPECIFIED: ICD-10-CM

## 2022-01-03 PROCEDURE — 99214 OFFICE O/P EST MOD 30 MIN: CPT | Mod: 25,95

## 2022-01-03 NOTE — HISTORY OF PRESENT ILLNESS
[Continuous Glucose Monitoring] : Continuous Glucose Monitoring: Yes [Dennis] : Dennis [FreeTextEntry1] : Patient with a history of post pancreatic surgery and type 1 diabetes. She is presently using Lantus 8 u daily and Humalog sliding scale before meals. She is now wearing a Dennis sensor.  Mostly she takes 8 u of Humalog at BF since always high by lunch. She can drop low overnight so they lowered Lantus to 8 u. Aide is having difficulty giving meal time insulin since sometimes the patient refuses to eat or only eats a small portion. \par She was seeing a rheumatologist, who was giving her Prolia injections but now receives it from her PCP. She is now in a wheel chair. \par According to daughter she mostly eats carbs hard to change her. \par Had labs with PCP recently. \par \par \par \par \par \par \par \par \par \par \par \par \par \par \par \par  [FreeTextEntry2] : 70 [FreeTextEntry3] : 15+14 [FreeTextEntry4] : 1 [de-identified] : 7.1 [FreeTextEntry5] : 158 [FreeTextEntry6] : 44.5

## 2022-01-03 NOTE — REASON FOR VISIT
[DM Type 1] : DM Type 1 [Home] : at home, [unfilled] , at the time of the visit. [Medical Office: ([facility])___] : at the medical office located in  [Verbal consent obtained from patient] : the patient, [unfilled] [Family Member] : family member [Formal Caregiver] : formal caregiver

## 2022-01-03 NOTE — ASSESSMENT
[Diabetes Foot Care] : diabetes foot care [Long Term Vascular Complications] : long term vascular complications of diabetes [Carbohydrate Consistent Diet] : carbohydrate consistent diet [Importance of Diet and Exercise] : importance of diet and exercise to improve glycemic control, achieve weight loss and improve cardiovascular health [Hypoglycemia Management] : hypoglycemia management [Retinopathy Screening] : Patient was referred to ophthalmology for retinopathy screening [FreeTextEntry1] : Type 1 diabetes.- Sensor tracing shows her BS are stable. She does have some post prandial spikes. Which are related to the aides being fearful of giving insulin when BS is <100 before a meal. Advised that her body does not make insulin and therefore they must give insulin before a meal. She does tend to come down overnight and runs in the 70-80s. Advised can lower Lantus to 7 u qhs if still running low can give snack for BS < 200 before bed. Discussed proper snacks to give. Aides also concerned that sometimes she is fussy and doesn't want to eat. And they are not sure what to do in terms of giving her insulin before a meal. Advised can give half before the meal and if she has the whole meal to give her the rest as soon as she finishes. \par Recommended meeting with CDE for more education.\par  \par Will request labs from PCP for review. \par Paperwork for redd sensor filled out.

## 2022-01-03 NOTE — PHYSICAL EXAM
[Alert] : alert [No Acute Distress] : no acute distress [Normal Voice/Communication] : normal voice communication [Normal Sclera/Conjunctiva] : normal sclera/conjunctiva [No Respiratory Distress] : no respiratory distress [Oriented x3] : oriented to person, place, and time [Normal Insight/Judgement] : insight and judgment were intact [de-identified] : Limited exam due to Telehealth visit secondary to Covid pandemic

## 2022-01-18 ENCOUNTER — RX RENEWAL (OUTPATIENT)
Age: 82
End: 2022-01-18

## 2022-02-22 ENCOUNTER — NON-APPOINTMENT (OUTPATIENT)
Age: 82
End: 2022-02-22

## 2022-05-10 ENCOUNTER — APPOINTMENT (OUTPATIENT)
Dept: ENDOCRINOLOGY | Facility: CLINIC | Age: 82
End: 2022-05-10
Payer: MEDICARE

## 2022-05-10 PROCEDURE — 99213 OFFICE O/P EST LOW 20 MIN: CPT | Mod: 25,95

## 2022-05-10 NOTE — ASSESSMENT
[FreeTextEntry1] : Type 1 diabetes.- sensor shows lows overnight and significant spike AB as well as later in the day. She will remain on Lantus 6- u in am. To have HS snack. Seems to have most PP spikes AL so will add 1-2 u to scale at BF check 1-2 hour PP,  and since can drop low overnight will decrease Humalog dose based on scale at dinner by 1-2 u.  \par f/u 3 months ; reconsider meeting with RD/CDE. needs to help focus on diet.  [Diabetes Foot Care] : diabetes foot care [Long Term Vascular Complications] : long term vascular complications of diabetes [Carbohydrate Consistent Diet] : carbohydrate consistent diet [Importance of Diet and Exercise] : importance of diet and exercise to improve glycemic control, achieve weight loss and improve cardiovascular health [Hypoglycemia Management] : hypoglycemia management [Self Monitoring of Blood Glucose] : self monitoring of blood glucose

## 2022-05-10 NOTE — PHYSICAL EXAM
[Alert] : alert [No Acute Distress] : no acute distress [Normal Voice/Communication] : normal voice communication [Normal Sclera/Conjunctiva] : normal sclera/conjunctiva [No Respiratory Distress] : no respiratory distress [Oriented x3] : oriented to person, place, and time [Normal Insight/Judgement] : insight and judgment were intact [de-identified] : Limited exam due to Telehealth visit secondary to Covid pandemic

## 2022-05-10 NOTE — REASON FOR VISIT
[Home] : at home, [unfilled] , at the time of the visit. [Medical Office: ([facility])___] : at the medical office located in  [Verbal consent obtained from patient] : the patient, [unfilled] [Follow - Up] : a follow-up visit [DM Type 1] : DM Type 1

## 2022-05-10 NOTE — HISTORY OF PRESENT ILLNESS
[FreeTextEntry1] : Patient with a history of post pancreatic surgery and type 1 diabetes. She is presently using Lantus 6 u daily and Humalog  sliding scale before meals. She is now wearing a Dennis sensor.  Mostly she takes 6 u of Humalog at BF since always high by lunch. She can drop low overnight so they lowered Lantus to 6  u. A1C 7.7. GMI 7.1 GV 35.6 \par She was seeing a rheumatologist, who was giving her Prolia injections but now receives it from her PCP. . She is now in a wheel chair. \par According to daughter she mostly eats carbs hard to change her. \par \par \par \par \par \par \par \par \par \par \par \par \par \par \par \par  [Continuous Glucose Monitoring] : Continuous Glucose Monitoring: Yes [Dennis] : Dennis [FreeTextEntry2] : 68 [FreeTextEntry3] : 32 [FreeTextEntry4] : 0 [de-identified] : 7.1 [FreeTextEntry5] : 160 [FreeTextEntry6] : 35.6

## 2022-10-24 RX ORDER — BLOOD SUGAR DIAGNOSTIC
STRIP MISCELLANEOUS 4 TIMES DAILY
Qty: 3 | Refills: 2 | Status: ACTIVE | COMMUNITY
Start: 2022-10-24 | End: 1900-01-01

## 2023-01-23 ENCOUNTER — RX RENEWAL (OUTPATIENT)
Age: 83
End: 2023-01-23

## 2023-10-12 ENCOUNTER — RX RENEWAL (OUTPATIENT)
Age: 83
End: 2023-10-12

## 2023-11-05 ENCOUNTER — NON-APPOINTMENT (OUTPATIENT)
Age: 83
End: 2023-11-05

## 2023-11-16 ENCOUNTER — NON-APPOINTMENT (OUTPATIENT)
Age: 83
End: 2023-11-16

## 2023-11-27 ENCOUNTER — APPOINTMENT (OUTPATIENT)
Dept: ENDOCRINOLOGY | Facility: CLINIC | Age: 83
End: 2023-11-27

## 2024-01-09 ENCOUNTER — RX CHANGE (OUTPATIENT)
Age: 84
End: 2024-01-09

## 2024-01-09 ENCOUNTER — RX RENEWAL (OUTPATIENT)
Age: 84
End: 2024-01-09

## 2024-01-19 ENCOUNTER — RX RENEWAL (OUTPATIENT)
Age: 84
End: 2024-01-19

## 2024-03-28 ENCOUNTER — APPOINTMENT (OUTPATIENT)
Dept: ENDOCRINOLOGY | Facility: CLINIC | Age: 84
End: 2024-03-28
Payer: MEDICARE

## 2024-03-28 DIAGNOSIS — Z85.07 PERSONAL HISTORY OF MALIGNANT NEOPLASM OF PANCREAS: ICD-10-CM

## 2024-03-28 DIAGNOSIS — E10.9 TYPE 1 DIABETES MELLITUS W/OUT COMPLICATIONS: ICD-10-CM

## 2024-03-28 PROCEDURE — 99213 OFFICE O/P EST LOW 20 MIN: CPT

## 2024-03-28 NOTE — PHYSICAL EXAM
[Alert] : alert [No Acute Distress] : no acute distress [Normal Voice/Communication] : normal voice communication [Normal Sclera/Conjunctiva] : normal sclera/conjunctiva [No Respiratory Distress] : no respiratory distress [Oriented x3] : oriented to person, place, and time [Normal Insight/Judgement] : insight and judgment were intact [de-identified] : Limited exam due to Telehealth visit secondary to Covid pandemic

## 2024-03-28 NOTE — ASSESSMENT
[Diabetes Foot Care] : diabetes foot care [Long Term Vascular Complications] : long term vascular complications of diabetes [Carbohydrate Consistent Diet] : carbohydrate consistent diet [Hypoglycemia Management] : hypoglycemia management [Importance of Diet and Exercise] : importance of diet and exercise to improve glycemic control, achieve weight loss and improve cardiovascular health [Self Monitoring of Blood Glucose] : self monitoring of blood glucose [FreeTextEntry1] : Patient with type 1 diabetes on Lantus to 7 u and use scale of Humalog before meals. GMI 6.5. . Important to avoid hypoglycemia. Check CGM HS and if dropping to take a snack for CARBS.  On download she tends to be around 100 overnight but after breakfast does spike variably into higher numbers.  She does have 6% lows which can be closer to bedtime.  They are more aware of this they have been giving her a lower dose Humalog sometimes 1 to 3 units at mealtime. In January insurance will change Lantus to Tresiba and Humalog to Lispro. Can also upgrade CGM to Freestyle Dennis 3.

## 2024-03-28 NOTE — HISTORY OF PRESENT ILLNESS
[FreeTextEntry1] : Patient with a history of post pancreatic surgery and type 1 diabetes. She is presently using Lantus 6 u daily and Humalog  sliding scale before meals. She is now wearing a Dennis sensor.  Mostly she takes 3 u of Humalog at BF since always high by lunch. She can drop low overnight, so they lowered Lantus to 6  u. She was seeing a rheumatologist, who was giving her Prolia injections but now receives it from her PCP. . She is now in a wheel chair.  According to daughter she mostly eats carbs hard to change her.                  [Continuous Glucose Monitoring] : Continuous Glucose Monitoring: Yes [Dennis] : Dennis [FreeTextEntry2] : 72 [FreeTextEntry3] : 22 [FreeTextEntry4] : 6 [de-identified] : 6.4 [FreeTextEntry5] : 134 [FreeTextEntry6] : 42.8

## 2024-04-04 ENCOUNTER — INPATIENT (INPATIENT)
Facility: HOSPITAL | Age: 84
LOS: 9 days | Discharge: HOME HEALTH | DRG: 316 | End: 2024-04-14
Attending: STUDENT IN AN ORGANIZED HEALTH CARE EDUCATION/TRAINING PROGRAM | Admitting: STUDENT IN AN ORGANIZED HEALTH CARE EDUCATION/TRAINING PROGRAM
Payer: MEDICARE

## 2024-04-04 VITALS
HEART RATE: 96 BPM | SYSTOLIC BLOOD PRESSURE: 121 MMHG | RESPIRATION RATE: 20 BRPM | OXYGEN SATURATION: 98 % | DIASTOLIC BLOOD PRESSURE: 73 MMHG

## 2024-04-04 DIAGNOSIS — I48.91 UNSPECIFIED ATRIAL FIBRILLATION: ICD-10-CM

## 2024-04-04 DIAGNOSIS — R58 HEMORRHAGE, NOT ELSEWHERE CLASSIFIED: ICD-10-CM

## 2024-04-04 DIAGNOSIS — T14.8XXA OTHER INJURY OF UNSPECIFIED BODY REGION, INITIAL ENCOUNTER: ICD-10-CM

## 2024-04-04 DIAGNOSIS — Z29.9 ENCOUNTER FOR PROPHYLACTIC MEASURES, UNSPECIFIED: ICD-10-CM

## 2024-04-04 DIAGNOSIS — Z91.89 OTHER SPECIFIED PERSONAL RISK FACTORS, NOT ELSEWHERE CLASSIFIED: ICD-10-CM

## 2024-04-04 DIAGNOSIS — K21.9 GASTRO-ESOPHAGEAL REFLUX DISEASE WITHOUT ESOPHAGITIS: ICD-10-CM

## 2024-04-04 DIAGNOSIS — Z90.410 ACQUIRED TOTAL ABSENCE OF PANCREAS: Chronic | ICD-10-CM

## 2024-04-04 DIAGNOSIS — Z90.410 ACQUIRED TOTAL ABSENCE OF PANCREAS: ICD-10-CM

## 2024-04-04 DIAGNOSIS — I25.10 ATHEROSCLEROTIC HEART DISEASE OF NATIVE CORONARY ARTERY WITHOUT ANGINA PECTORIS: ICD-10-CM

## 2024-04-04 DIAGNOSIS — F03.90 UNSPECIFIED DEMENTIA, UNSPECIFIED SEVERITY, WITHOUT BEHAVIORAL DISTURBANCE, PSYCHOTIC DISTURBANCE, MOOD DISTURBANCE, AND ANXIETY: ICD-10-CM

## 2024-04-04 DIAGNOSIS — E11.9 TYPE 2 DIABETES MELLITUS WITHOUT COMPLICATIONS: ICD-10-CM

## 2024-04-04 LAB
ALBUMIN SERPL ELPH-MCNC: 2.8 G/DL — LOW (ref 3.3–5)
ALP SERPL-CCNC: 93 U/L — SIGNIFICANT CHANGE UP (ref 40–120)
ALT FLD-CCNC: 28 U/L — SIGNIFICANT CHANGE UP (ref 10–45)
ANION GAP SERPL CALC-SCNC: 12 MMOL/L — SIGNIFICANT CHANGE UP (ref 5–17)
APPEARANCE UR: CLEAR — SIGNIFICANT CHANGE UP
APTT BLD: 26 SEC — SIGNIFICANT CHANGE UP (ref 24.5–35.6)
AST SERPL-CCNC: 37 U/L — SIGNIFICANT CHANGE UP (ref 10–40)
BASE EXCESS BLDV CALC-SCNC: -0.7 MMOL/L — SIGNIFICANT CHANGE UP (ref -2–3)
BASOPHILS # BLD AUTO: 0.02 K/UL — SIGNIFICANT CHANGE UP (ref 0–0.2)
BASOPHILS NFR BLD AUTO: 0.2 % — SIGNIFICANT CHANGE UP (ref 0–2)
BILIRUB SERPL-MCNC: 0.9 MG/DL — SIGNIFICANT CHANGE UP (ref 0.2–1.2)
BILIRUB UR-MCNC: NEGATIVE — SIGNIFICANT CHANGE UP
BUN SERPL-MCNC: 11 MG/DL — SIGNIFICANT CHANGE UP (ref 7–23)
CA-I SERPL-SCNC: 1.2 MMOL/L — SIGNIFICANT CHANGE UP (ref 1.15–1.33)
CALCIUM SERPL-MCNC: 8.6 MG/DL — SIGNIFICANT CHANGE UP (ref 8.4–10.5)
CHLORIDE BLDV-SCNC: 100 MMOL/L — SIGNIFICANT CHANGE UP (ref 96–108)
CHLORIDE SERPL-SCNC: 102 MMOL/L — SIGNIFICANT CHANGE UP (ref 96–108)
CO2 BLDV-SCNC: 26 MMOL/L — SIGNIFICANT CHANGE UP (ref 22–26)
CO2 SERPL-SCNC: 23 MMOL/L — SIGNIFICANT CHANGE UP (ref 22–31)
COLOR SPEC: YELLOW — SIGNIFICANT CHANGE UP
CREAT SERPL-MCNC: 0.35 MG/DL — LOW (ref 0.5–1.3)
DIFF PNL FLD: NEGATIVE — SIGNIFICANT CHANGE UP
EGFR: 100 ML/MIN/1.73M2 — SIGNIFICANT CHANGE UP
EOSINOPHIL # BLD AUTO: 0.1 K/UL — SIGNIFICANT CHANGE UP (ref 0–0.5)
EOSINOPHIL NFR BLD AUTO: 1 % — SIGNIFICANT CHANGE UP (ref 0–6)
GAS PNL BLDV: 132 MMOL/L — LOW (ref 136–145)
GAS PNL BLDV: SIGNIFICANT CHANGE UP
GLUCOSE BLDV-MCNC: 216 MG/DL — HIGH (ref 70–99)
GLUCOSE SERPL-MCNC: 226 MG/DL — HIGH (ref 70–99)
GLUCOSE UR QL: NEGATIVE MG/DL — SIGNIFICANT CHANGE UP
HCO3 BLDV-SCNC: 25 MMOL/L — SIGNIFICANT CHANGE UP (ref 22–29)
HCT VFR BLD CALC: 31.3 % — LOW (ref 34.5–45)
HCT VFR BLD CALC: 31.5 % — LOW (ref 34.5–45)
HCT VFR BLDA CALC: 31 % — LOW (ref 34.5–46.5)
HGB BLD CALC-MCNC: 10.4 G/DL — LOW (ref 11.7–16.1)
HGB BLD-MCNC: 10 G/DL — LOW (ref 11.5–15.5)
HGB BLD-MCNC: 10 G/DL — LOW (ref 11.5–15.5)
IMM GRANULOCYTES NFR BLD AUTO: 0.7 % — SIGNIFICANT CHANGE UP (ref 0–0.9)
INR BLD: 1.06 RATIO — SIGNIFICANT CHANGE UP (ref 0.85–1.18)
KETONES UR-MCNC: NEGATIVE MG/DL — SIGNIFICANT CHANGE UP
LACTATE BLDV-MCNC: 4.2 MMOL/L — CRITICAL HIGH (ref 0.5–2)
LEUKOCYTE ESTERASE UR-ACNC: NEGATIVE — SIGNIFICANT CHANGE UP
LYMPHOCYTES # BLD AUTO: 1.62 K/UL — SIGNIFICANT CHANGE UP (ref 1–3.3)
LYMPHOCYTES # BLD AUTO: 16.9 % — SIGNIFICANT CHANGE UP (ref 13–44)
MCHC RBC-ENTMCNC: 28.3 PG — SIGNIFICANT CHANGE UP (ref 27–34)
MCHC RBC-ENTMCNC: 28.5 PG — SIGNIFICANT CHANGE UP (ref 27–34)
MCHC RBC-ENTMCNC: 31.7 GM/DL — LOW (ref 32–36)
MCHC RBC-ENTMCNC: 31.9 GM/DL — LOW (ref 32–36)
MCV RBC AUTO: 89.2 FL — SIGNIFICANT CHANGE UP (ref 80–100)
MCV RBC AUTO: 89.2 FL — SIGNIFICANT CHANGE UP (ref 80–100)
MONOCYTES # BLD AUTO: 0.5 K/UL — SIGNIFICANT CHANGE UP (ref 0–0.9)
MONOCYTES NFR BLD AUTO: 5.2 % — SIGNIFICANT CHANGE UP (ref 2–14)
NEUTROPHILS # BLD AUTO: 7.28 K/UL — SIGNIFICANT CHANGE UP (ref 1.8–7.4)
NEUTROPHILS NFR BLD AUTO: 76 % — SIGNIFICANT CHANGE UP (ref 43–77)
NITRITE UR-MCNC: NEGATIVE — SIGNIFICANT CHANGE UP
NRBC # BLD: 0 /100 WBCS — SIGNIFICANT CHANGE UP (ref 0–0)
NRBC # BLD: 0 /100 WBCS — SIGNIFICANT CHANGE UP (ref 0–0)
PCO2 BLDV: 44 MMHG — HIGH (ref 39–42)
PH BLDV: 7.36 — SIGNIFICANT CHANGE UP (ref 7.32–7.43)
PH UR: 6 — SIGNIFICANT CHANGE UP (ref 5–8)
PLATELET # BLD AUTO: 379 K/UL — SIGNIFICANT CHANGE UP (ref 150–400)
PLATELET # BLD AUTO: 400 K/UL — SIGNIFICANT CHANGE UP (ref 150–400)
PO2 BLDV: 26 MMHG — SIGNIFICANT CHANGE UP (ref 25–45)
POTASSIUM BLDV-SCNC: 4.2 MMOL/L — SIGNIFICANT CHANGE UP (ref 3.5–5.1)
POTASSIUM SERPL-MCNC: 4.4 MMOL/L — SIGNIFICANT CHANGE UP (ref 3.5–5.3)
POTASSIUM SERPL-SCNC: 4.4 MMOL/L — SIGNIFICANT CHANGE UP (ref 3.5–5.3)
PROT SERPL-MCNC: 6.1 G/DL — SIGNIFICANT CHANGE UP (ref 6–8.3)
PROT UR-MCNC: NEGATIVE MG/DL — SIGNIFICANT CHANGE UP
PROTHROM AB SERPL-ACNC: 11.1 SEC — SIGNIFICANT CHANGE UP (ref 9.5–13)
RBC # BLD: 3.51 M/UL — LOW (ref 3.8–5.2)
RBC # BLD: 3.53 M/UL — LOW (ref 3.8–5.2)
RBC # FLD: 15 % — HIGH (ref 10.3–14.5)
RBC # FLD: 15.1 % — HIGH (ref 10.3–14.5)
SAO2 % BLDV: 29.1 % — LOW (ref 67–88)
SODIUM SERPL-SCNC: 137 MMOL/L — SIGNIFICANT CHANGE UP (ref 135–145)
SP GR SPEC: 1.01 — SIGNIFICANT CHANGE UP (ref 1–1.03)
TROPONIN T, HIGH SENSITIVITY RESULT: 42 NG/L — SIGNIFICANT CHANGE UP (ref 0–51)
UROBILINOGEN FLD QL: 0.2 MG/DL — SIGNIFICANT CHANGE UP (ref 0.2–1)
WBC # BLD: 8.86 K/UL — SIGNIFICANT CHANGE UP (ref 3.8–10.5)
WBC # BLD: 9.59 K/UL — SIGNIFICANT CHANGE UP (ref 3.8–10.5)
WBC # FLD AUTO: 8.86 K/UL — SIGNIFICANT CHANGE UP (ref 3.8–10.5)
WBC # FLD AUTO: 9.59 K/UL — SIGNIFICANT CHANGE UP (ref 3.8–10.5)

## 2024-04-04 PROCEDURE — 71045 X-RAY EXAM CHEST 1 VIEW: CPT | Mod: 26

## 2024-04-04 PROCEDURE — 99221 1ST HOSP IP/OBS SF/LOW 40: CPT

## 2024-04-04 PROCEDURE — 74177 CT ABD & PELVIS W/CONTRAST: CPT | Mod: 26,MC

## 2024-04-04 PROCEDURE — 99497 ADVNCD CARE PLAN 30 MIN: CPT | Mod: 25

## 2024-04-04 PROCEDURE — 93010 ELECTROCARDIOGRAM REPORT: CPT | Mod: 1L

## 2024-04-04 PROCEDURE — 99223 1ST HOSP IP/OBS HIGH 75: CPT

## 2024-04-04 RX ORDER — METHENAMINE MANDELATE 1 G
1 TABLET ORAL
Refills: 0 | Status: DISCONTINUED | OUTPATIENT
Start: 2024-04-04 | End: 2024-04-08

## 2024-04-04 RX ORDER — MORPHINE SULFATE 50 MG/1
2 CAPSULE, EXTENDED RELEASE ORAL ONCE
Refills: 0 | Status: DISCONTINUED | OUTPATIENT
Start: 2024-04-04 | End: 2024-04-04

## 2024-04-04 RX ORDER — FERROUS SULFATE 325(65) MG
325 TABLET ORAL DAILY
Refills: 0 | Status: DISCONTINUED | OUTPATIENT
Start: 2024-04-04 | End: 2024-04-14

## 2024-04-04 RX ORDER — SODIUM CHLORIDE 9 MG/ML
1000 INJECTION, SOLUTION INTRAVENOUS
Refills: 0 | Status: DISCONTINUED | OUTPATIENT
Start: 2024-04-04 | End: 2024-04-14

## 2024-04-04 RX ORDER — IPRATROPIUM/ALBUTEROL SULFATE 18-103MCG
3 AEROSOL WITH ADAPTER (GRAM) INHALATION EVERY 6 HOURS
Refills: 0 | Status: DISCONTINUED | OUTPATIENT
Start: 2024-04-04 | End: 2024-04-14

## 2024-04-04 RX ORDER — ACETAMINOPHEN 500 MG
1000 TABLET ORAL ONCE
Refills: 0 | Status: COMPLETED | OUTPATIENT
Start: 2024-04-04 | End: 2024-04-04

## 2024-04-04 RX ORDER — INSULIN GLARGINE 100 [IU]/ML
4 INJECTION, SOLUTION SUBCUTANEOUS AT BEDTIME
Refills: 0 | Status: DISCONTINUED | OUTPATIENT
Start: 2024-04-04 | End: 2024-04-14

## 2024-04-04 RX ORDER — CHOLECALCIFEROL (VITAMIN D3) 125 MCG
1000 CAPSULE ORAL DAILY
Refills: 0 | Status: DISCONTINUED | OUTPATIENT
Start: 2024-04-04 | End: 2024-04-14

## 2024-04-04 RX ORDER — ACETAMINOPHEN 500 MG
650 TABLET ORAL EVERY 6 HOURS
Refills: 0 | Status: DISCONTINUED | OUTPATIENT
Start: 2024-04-04 | End: 2024-04-14

## 2024-04-04 RX ORDER — SODIUM CHLORIDE 9 MG/ML
1000 INJECTION, SOLUTION INTRAVENOUS ONCE
Refills: 0 | Status: COMPLETED | OUTPATIENT
Start: 2024-04-04 | End: 2024-04-04

## 2024-04-04 RX ORDER — MEMANTINE HYDROCHLORIDE 10 MG/1
10 TABLET ORAL
Refills: 0 | Status: DISCONTINUED | OUTPATIENT
Start: 2024-04-04 | End: 2024-04-14

## 2024-04-04 RX ORDER — PREGABALIN 225 MG/1
1000 CAPSULE ORAL DAILY
Refills: 0 | Status: DISCONTINUED | OUTPATIENT
Start: 2024-04-04 | End: 2024-04-14

## 2024-04-04 RX ORDER — QUETIAPINE FUMARATE 200 MG/1
25 TABLET, FILM COATED ORAL
Refills: 0 | Status: DISCONTINUED | OUTPATIENT
Start: 2024-04-04 | End: 2024-04-14

## 2024-04-04 RX ORDER — DEXTROSE 50 % IN WATER 50 %
25 SYRINGE (ML) INTRAVENOUS ONCE
Refills: 0 | Status: DISCONTINUED | OUTPATIENT
Start: 2024-04-04 | End: 2024-04-14

## 2024-04-04 RX ORDER — PANTOPRAZOLE SODIUM 20 MG/1
40 TABLET, DELAYED RELEASE ORAL
Refills: 0 | Status: DISCONTINUED | OUTPATIENT
Start: 2024-04-04 | End: 2024-04-10

## 2024-04-04 RX ORDER — ASCORBIC ACID 60 MG
500 TABLET,CHEWABLE ORAL
Refills: 0 | Status: DISCONTINUED | OUTPATIENT
Start: 2024-04-04 | End: 2024-04-14

## 2024-04-04 RX ORDER — DEXTROSE 50 % IN WATER 50 %
15 SYRINGE (ML) INTRAVENOUS ONCE
Refills: 0 | Status: DISCONTINUED | OUTPATIENT
Start: 2024-04-04 | End: 2024-04-14

## 2024-04-04 RX ORDER — LIPASE/PROTEASE/AMYLASE 16-48-48K
1 CAPSULE,DELAYED RELEASE (ENTERIC COATED) ORAL
Refills: 0 | Status: DISCONTINUED | OUTPATIENT
Start: 2024-04-04 | End: 2024-04-14

## 2024-04-04 RX ORDER — VANCOMYCIN HCL 1 G
1000 VIAL (EA) INTRAVENOUS ONCE
Refills: 0 | Status: COMPLETED | OUTPATIENT
Start: 2024-04-04 | End: 2024-04-04

## 2024-04-04 RX ORDER — GLUCAGON INJECTION, SOLUTION 0.5 MG/.1ML
1 INJECTION, SOLUTION SUBCUTANEOUS ONCE
Refills: 0 | Status: DISCONTINUED | OUTPATIENT
Start: 2024-04-04 | End: 2024-04-14

## 2024-04-04 RX ORDER — DEXTROSE 10 % IN WATER 10 %
125 INTRAVENOUS SOLUTION INTRAVENOUS ONCE
Refills: 0 | Status: DISCONTINUED | OUTPATIENT
Start: 2024-04-04 | End: 2024-04-14

## 2024-04-04 RX ORDER — LANOLIN ALCOHOL/MO/W.PET/CERES
3 CREAM (GRAM) TOPICAL AT BEDTIME
Refills: 0 | Status: DISCONTINUED | OUTPATIENT
Start: 2024-04-04 | End: 2024-04-14

## 2024-04-04 RX ORDER — DEXTROSE 50 % IN WATER 50 %
12.5 SYRINGE (ML) INTRAVENOUS ONCE
Refills: 0 | Status: DISCONTINUED | OUTPATIENT
Start: 2024-04-04 | End: 2024-04-14

## 2024-04-04 RX ORDER — FAMOTIDINE 10 MG/ML
20 INJECTION INTRAVENOUS DAILY
Refills: 0 | Status: DISCONTINUED | OUTPATIENT
Start: 2024-04-04 | End: 2024-04-14

## 2024-04-04 RX ORDER — CALCIUM CARBONATE 500(1250)
1 TABLET ORAL DAILY
Refills: 0 | Status: DISCONTINUED | OUTPATIENT
Start: 2024-04-04 | End: 2024-04-14

## 2024-04-04 RX ORDER — INSULIN LISPRO 100/ML
VIAL (ML) SUBCUTANEOUS
Refills: 0 | Status: DISCONTINUED | OUTPATIENT
Start: 2024-04-04 | End: 2024-04-14

## 2024-04-04 RX ORDER — SERTRALINE 25 MG/1
25 TABLET, FILM COATED ORAL AT BEDTIME
Refills: 0 | Status: DISCONTINUED | OUTPATIENT
Start: 2024-04-04 | End: 2024-04-14

## 2024-04-04 RX ORDER — PIPERACILLIN AND TAZOBACTAM 4; .5 G/20ML; G/20ML
3.38 INJECTION, POWDER, LYOPHILIZED, FOR SOLUTION INTRAVENOUS ONCE
Refills: 0 | Status: COMPLETED | OUTPATIENT
Start: 2024-04-04 | End: 2024-04-04

## 2024-04-04 RX ADMIN — Medication 400 MILLIGRAM(S): at 20:46

## 2024-04-04 RX ADMIN — PIPERACILLIN AND TAZOBACTAM 200 GRAM(S): 4; .5 INJECTION, POWDER, LYOPHILIZED, FOR SOLUTION INTRAVENOUS at 17:58

## 2024-04-04 RX ADMIN — MORPHINE SULFATE 2 MILLIGRAM(S): 50 CAPSULE, EXTENDED RELEASE ORAL at 16:03

## 2024-04-04 RX ADMIN — Medication 250 MILLIGRAM(S): at 16:04

## 2024-04-04 RX ADMIN — SODIUM CHLORIDE 1000 MILLILITER(S): 9 INJECTION, SOLUTION INTRAVENOUS at 16:03

## 2024-04-04 NOTE — ED ADULT NURSE REASSESSMENT NOTE - NS ED NURSE REASSESS COMMENT FT1
Patient straight cathed for urine using sterile technique. Second RN present to confirm sterility. Explained procedure as it was being done - Pt tolerated procedure well. Sterile specimens collected and sent to lab as ordered. drained aprox 300 ml of urine. Comfort and safety provided.

## 2024-04-04 NOTE — H&P ADULT - PROBLEM SELECTOR PLAN 8
C/w Vitamin B12, C, D, calcium, and iron tablets; c/w methenamine 1g daily for UTI prophylaxis  DVT PPx: SCDs  Code Status: CPR

## 2024-04-04 NOTE — H&P ADULT - PROBLEM SELECTOR PLAN 3
- Supposedly on ASA and Brillinta per HIE note on 3/28/24, hold both for now - On Brillinta for stent placed 4 years ago, hold for now given hematoma

## 2024-04-04 NOTE — H&P ADULT - NSICDXPASTMEDICALHX_GEN_ALL_CORE_FT
PAST MEDICAL HISTORY:  CAD (coronary artery disease)     Dementia      PAST MEDICAL HISTORY:  CAD (coronary artery disease)     Dementia     Diabetes mellitus     GERD (gastroesophageal reflux disease)     Pancreatic cancer

## 2024-04-04 NOTE — H&P ADULT - HISTORY OF PRESENT ILLNESS
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 She developed bruising on the labia on Monday around 3 days ago, but now there has been significant expansion of the bruising today with spread to the right thigh and abdominal area. The patient lives at home with an aide, has a daughter who visits her. Brought to the ED for further evaluation.    In the ED, she was afebrile and hemodynamically stable, saturating well on room air. CBC w/ normocytic anemia of 10, coags and CMP wnl except for hyperglycemia and hypoalbuminemia of 2.8. UA wnl. CT abdomen/pelvis showing R peroneal/buttock edema w/ heterogeneity and asymmetric enlargement of the R hip adductor muscles extending to the level of the distal femoral diaphysis, differential being IM hematoma w/ overlying contusion vs cellulitis w/ muscular extension and phlegmonous change. Age-indeterminate mod-severe T11 and L1 compression deformities also seen. Was given IV tylenol, Vanc/Zosyn/Clindamycin in the ED. History obtained by patient's daughter at bedside, Shirley. Patient is hard of hearing but does answer some questions, moaning intermittently. 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 She developed bruising on the labia on Monday around 3 days ago, but now there has been significant expansion of the bruising today with spread to the right thigh and abdominal area. She had an ultrasound done on Monday which showed a solid mixed cystic mass 4x8cm in the right thigh most likely representing a hematoma. The patient's PCP upon receiving the result, called the daughter and told her to bring her to the hospital. Had a CBC done on 4/2 which showed an Hb of 11.8. The patient lives at home with an aide, has 2 daughters who visits her. Per daughter at bedside, there has been no history of falls at home, however she is known to bruise very easily. Daughter states that patient has a bruise on her right arm that she saw happen when the aide gripped her wrist, stated that it didn't look like a lot of force was used. She believes the hematoma may have happened possibly from the patient being transferred from her bed to her wheelchair as that's used to move the patient. Patient was taken off of ASA 2-3 years ago and has is not on any full-dose AC. Brought to the ED for further evaluation. At bedside the patient is moaning intermittently, but does know her name, responds to some questions if asked loudly, and when her hip was touched, stated that it hurts. Daughter states that the patient usually knows her name and if she is in her house, doesn't really know she is in a hospital, has very bad short-term memory.    In the ED, she was afebrile and hemodynamically stable, saturating well on room air. CBC w/ normocytic anemia of 10, coags and CMP wnl except for hyperglycemia and hypoalbuminemia of 2.8. UA wnl. CT abdomen/pelvis showing R peroneal/buttock edema w/ heterogeneity and asymmetric enlargement of the R hip adductor muscles extending to the level of the distal femoral diaphysis, differential being IM hematoma w/ overlying contusion vs cellulitis w/ muscular extension and phlegmonous change. Age-indeterminate mod-severe T11 and L1 compression deformities also seen (documented to be present in HIE note in 2019). Was given IV tylenol, Vanc/Zosyn/Clindamycin in the ED.

## 2024-04-04 NOTE — H&P ADULT - PROBLEM SELECTOR PLAN 6
DVT PPx: SCDs  Code Status: - Had pancreatic CA in the past, received Whipple's surgery 16 years ago, has associated fat malabsorption  - Creon capsule before meals

## 2024-04-04 NOTE — ED ADULT NURSE NOTE - NSFALLHARMRISKINTERV_ED_ALL_ED
Assistance OOB with selected safe patient handling equipment if applicable/Assistance with ambulation/Communicate risk of Fall with Harm to all staff, patient, and family/Monitor gait and stability/Monitor for mental status changes and reorient to person, place, and time, as needed/Move patient closer to nursing station/within visual sight of ED staff/Provide patient with walking aids/Provide visual cue: red socks, yellow wristband, yellow gown, etc/Reinforce activity limits and safety measures with patient and family/Toileting schedule using arm’s reach rule for commode and bathroom/Use of alarms - bed, stretcher, chair and/or video monitoring/Bed in lowest position, wheels locked, appropriate side rails in place/Call bell, personal items and telephone in reach/Instruct patient to call for assistance before getting out of bed/chair/stretcher/Non-slip footwear applied when patient is off stretcher/[place] to call system/Physically safe environment - no spills, clutter or unnecessary equipment/Purposeful Proactive Rounding/Room/bathroom lighting operational, light cord in reach

## 2024-04-04 NOTE — CONSULT NOTE ADULT - SUBJECTIVE AND OBJECTIVE BOX
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 She developed bruising on the labia on Monday around 3 days ago, but now there has been significant expansion of the bruising today with spread to the right thigh and abdominal area. The patient lives at home with an aide, has a daughter who visits her. Brought to the ED for further evaluation.    In the ED, she was afebrile and hemodynamically stable, saturating well on room air. CBC w/ normocytic anemia of 10, coags and CMP wnl except for hyperglycemia and hypoalbuminemia of 2.8. UA wnl. CT abdomen/pelvis showing R peroneal/buttock edema w/ heterogeneity and asymmetric enlargement of the R hip adductor muscles extending to the level of the distal femoral diaphysis, differential being IM hematoma w/ overlying contusion vs cellulitis w/ muscular extension and phlegmonous change. Age-indeterminate mod-severe T11 and L1 compression deformities also seen. Was given IV tylenol, Vanc/Zosyn/Clindamycin in the ED. (2024 21:21)    ROS: 10-system review is otherwise negative except HPI above.      PAST MEDICAL & SURGICAL HISTORY:  CAD (coronary artery disease)    Dementia    FAMILY HISTORY:    SOCIAL HISTORY:      ALLERGIES: No Known Allergies      HOME MEDICATIONS:     CURRENT MEDICATIONS  MEDICATIONS (STANDING):   MEDICATIONS (PRN):acetaminophen     Tablet .. 650 milliGRAM(s) Oral every 6 hours PRN Temp greater or equal to 38C (100.4F), Mild Pain (1 - 3)  melatonin 3 milliGRAM(s) Oral at bedtime PRN Insomnia    --------------------------------------------------------------------------------------------    Vitals:   T(C): 36.4 (24 @ 22:30), Max: 36.4 (24 @ 15:38)  HR: 80 (24 @ 22:30) (80 - 96)  BP: 131/75 (24 @ 22:30) (119/72 - 141/81)  RR: 18 (24 @ 22:30) (16 - 20)  SpO2: 98% (24 @ 22:30) (95% - 98%)  CAPILLARY BLOOD GLUCOSE      POCT Blood Glucose.: 218 mg/dL (2024 19:53)  POCT Blood Glucose.: 253 mg/dL (2024 15:05)    CAPILLARY BLOOD GLUCOSE      POCT Blood Glucose.: 218 mg/dL (2024 19:53)  POCT Blood Glucose.: 253 mg/dL (2024 15:05)      Weight (kg): 56.8 ( @ 20:34)    PHYSICAL EXAM:   General: NAD, Lying in bed comfortably  Neuro: A+Ox3  HEENT: NC/AT, EOMI  Neck: Soft, supple  Cardio: RRR, nml S1/S2  Resp: Good effort, CTA b/l  GI/Abd: Soft, NT/ND, no rebound/guarding, no masses palpated  Vascular:   Skin: Intact, no breakdown  Lymphatic/Nodes: No palpable lymphadenopathy  Musculoskeletal: All 4 extremities moving spontaneously, no limitations.  --------------------------------------------------------------------------------------------    LABS  CBC ( @ 22:55)                              10.0<L>                         8.86    )----------------(  379        --    % Neutrophils, --    % Lymphocytes, ANC: --                                  31.3<L>  CBC (04-04 @ 15:46)                              10.0<L>                         9.59    )----------------(  400        76.0  % Neutrophils, 16.9  % Lymphocytes, ANC: 7.28                                31.5<L>    BMP ( 15:46)             137     |  102     |  11    		Ca++ --      Ca 8.6                ---------------------------------( 226<H>		Mg --                 4.4     |  23      |  0.35<L>			Ph --        LFTs ( 15:46)      TPro 6.1 / Alb 2.8<L> / TBili 0.9 / DBili -- / AST 37 / ALT 28 / AlkPhos 93    Coags ( 15:46)  aPTT 26.0 / INR 1.06 / PT 11.1      ABG ( @ 17:57)      /  /  /  /  / %     Lactate:  1.5    VBG ( @ 15:39)     7.36 / 44<H> / 26 / 25 / -0.7 / 29.1<L>%     Lactate: 4.2<HH>    --------------------------------------------------------------------------------------------    MICROBIOLOGY  Urinalysis ( @ 15:53):     Color: Yellow / Appearance: Clear / S.013 / pH: 6.0 / Gluc: Negative / Ketones: Negative / Bili: Negative / Urobili: 0.2 / Protein :Negative / Nitrites: Negative / Leuk.Est: Negative / RBC:  / WBC:  / Sq Epi:  / Non Sq Epi:  / Bacteria          --------------------------------------------------------------------------------------------  IMAGING    Right peroneal/buttock edema with heterogeneity and asymmetric enlargement of the right hip abductor muscles, extending to the level of the distal femoral diaphysis. No associated emphysematous change suggest necrotizing fasciitis. No walled off collection. Primary differential includes intramuscular hematoma with overlying contusion and cellulitis with underlying infection extending into the muscle with resultant phlegmonous change.

## 2024-04-04 NOTE — ED PROVIDER NOTE - PHYSICAL EXAMINATION
GENERAL: Alert.  mild distress  due to pain  EYES: Anicteric.   HENT: Moist mucous membranes.   RESP: CTAB   CARDIOVASCULAR: RRR, no m/g/r  SKIN/ABDOMEN: soft, non distended.  significant darkness/ecchymosis over right labial area.  Skin color changes extend past right thigh.  Right inner thigh is pale yellow in color.  Bruising also extended up to abdominal area.   No obvious crepitus  MSK: ROM grossly normal in all 4 extremities. No deformities  NEUROLOGIC: Alert and oriented x0

## 2024-04-04 NOTE — H&P ADULT - CONVERSATION DETAILS
Asked patient and her daughters if she had any advance directives for code status given her dementia and functional status. Daughters stated that there was no official conversation ever because it ahs been years since the patient was hospitalized, but one of the daughters, Fuad is the HCP. Explained that this had to do with resuscitation measures if the patient ever lost a pulse or had a pulse but wasn't breathing on her own. Explained that options ranged form full code/CPR where chest compressions and intubation would be done, to DNR/intubate to DNR/DNI. Patient's daughters stated that due to their and the patient's Muslim beliefs, they would always want full resuscitation measures for the patient.

## 2024-04-04 NOTE — H&P ADULT - NSHPPOACENTRALVENOUSCATHETER_GEN_ALL_CORE
Rossana Molina is a 4year old female presenting to the walk-in clinic today with mother for sore throat and cough x 3 days. Today she had a fever of 103.2.      Treatment tried prior to visit: cold medicine, Tylenol at 2:15 pm     Swabs/Specimens collected during triage process:  COVID/FLU PCR - rapid no

## 2024-04-04 NOTE — H&P ADULT - PROBLEM SELECTOR PLAN 2
- AAO x 0 at baseline - ECG showing atrial fibrillation with regular rate  - Unknown history of atrial fibrillation, get repeat ECG in AM  - CHADsVASc score of 4, but given new hematoma w/ Hb drop on just Brillinta, would not start FD AC at the moment  - Will need-risk benefit discussion later in hospital course regarding full-dose AC - ECG showing atrial fibrillation with regular rate  - No prior history of atrial fibrillation per daughter, could be acute response in setting of  blood loss and pain 2/2 hematoma  - Get repeat ECG in AM  - CHADsVASc score of 4, but given new hematoma w/ Hb drop on just Brillinta, would not start FD AC at the moment  - Will need-risk benefit discussion later in hospital course regarding full-dose AC if Afib is persisting - ECG showing atrial fibrillation with regular rate  - No prior history of atrial fibrillation per daughter, could be acute response in setting of  blood loss and pain 2/2 hematoma  - Monitor on telemetry  - Get repeat ECG in AM  - CHADsVASc score of 4, but given new hematoma w/ Hb drop on just Brillinta, would not start FD AC at the moment  - Will need-risk benefit discussion later in hospital course regarding full-dose AC if Afib is persisting

## 2024-04-04 NOTE — H&P ADULT - PROBLEM SELECTOR PLAN 5
- Had pancreatic CA in the past, received Whipple's surgery, has associated fat malabsorption - AAO x 1-2 at baseline, currently AAOx1  - C/w Namenda 10mg BID, Seroquel 25mg BID, Sertraline 25mg daily

## 2024-04-04 NOTE — ED PROVIDER NOTE - OBJECTIVE STATEMENT
82 yo F with PmHx of CAD s/p stent on Brilinta 82 yo F with PmHx of CAD s/p stent on Brilinta and dementia, here for expending wound. 82 yo F with PmHx of CAD s/p stent on Brilinta and dementia, here for expending wound over right labia.   Redness slightly, patient had this more bruising over right labia on Monday which is about 3 days ago.  Today, there is significant expansion of bruising.  Now covering right thigh, spread up to abdominal area and patient is in significant pain.   Patient go to [facility].  Recently discharged from [facility] 84 yo F with PmHx of CAD s/p stent on Brilinta and dementia, here for expending wound over right labia.   Redness slightly, patient had this starting on Monday however more bruising now.  Today, there is significant expansion of bruising.  Now covering right thigh, spread up to abdominal area and patient is in significant pain.   Recently discharged from another hospital where she was evaluated in the ED for a brief episode of becoming unresponsive.
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
## 2024-04-04 NOTE — ED PROVIDER NOTE - HIV OFFER
Department of Anesthesiology  Preprocedure Note       Name:  Vlad Carrillo   Age:  80 y.o.  :  1943                                          MRN:  [id number]         Date:  3/29/2024      Surgeon: Surgeon(s):  Harjit Blanco DO    Procedure: Procedure(s):  ESOPHAGOGASTRODUODENOSCOPY FOREIGN BODY REMOVAL    Medications prior to admission:   Prior to Admission medications    Medication Sig Start Date End Date Taking? Authorizing Provider   clopidogrel (PLAVIX) 75 MG tablet Take 1 tablet by mouth daily    Satish Warner MD   Milk Thistle 500 MG CAPS Take 1 capsule by mouth daily    Satish Warner MD   DULoxetine (CYMBALTA) 20 MG extended release capsule Take 1 capsule by mouth daily 22   Satish Warner MD   famotidine (PEPCID) 20 MG tablet Take 1 tablet by mouth 2 times daily 22   Satish Warner MD   furosemide (LASIX) 20 MG tablet Take 1 tablet by mouth daily 22   Satish Warner MD   aspirin 81 MG tablet Take 1 tablet by mouth daily    Satish Warner MD   atenolol (TENORMIN) 50 MG tablet Take 1 tablet by mouth daily 8/10/17   Satish Warner MD   nitroGLYCERIN (NITROSTAT) 0.4 MG SL tablet Place 1 tablet under the tongue every 5 minutes as needed 08   Satish Warner MD   pantoprazole (PROTONIX) 40 MG tablet Take 1 tablet by mouth at bedtime 8/15/17   Satish Warner MD   rOPINIRole (REQUIP) 2 MG tablet Take 1 tablet by mouth 4 times daily 1/10/17   Satish Warner MD   lisinopril (PRINIVIL;ZESTRIL) 10 MG tablet Take 1 tablet by mouth daily    Satish Warner MD   potassium chloride (KLOR-CON M) 20 MEQ TBCR extended release tablet Take 1 tablet by mouth daily (with breakfast)    Satish Warner MD       Current medications:    Current Facility-Administered Medications   Medication Dose Route Frequency Provider Last Rate Last Admin   • glucagon injection 1 mg  1 mg IntraVENous Once Sita De La O PA-C      Unable to answer due to medical condition/unresponsive/etc...
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
## 2024-04-04 NOTE — CONSULT NOTE ADULT - ASSESSMENT
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 She developed bruising on the labia on Monday around 3 days ago, but now there has been significant expansion of the bruising today with spread to the right thigh and abdominal area. The patient lives at home with an aide, has a daughter who visits her. Brought to the ED for further evaluation. Surgery is consulted for co-management of hematoma.    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 She developed bruising on the labia on Monday around 3 days ago, but now there has been significant expansion of the bruising today with spread to the right thigh and abdominal area. The patient lives at home with an aide, has a daughter who visits her. Brought to the ED for further evaluation. Surgery is consulted for co-management of hematoma.    Recommendations:  - H&H monitoring q6   - If there are signs of expansion of drop of H&H then CTA    ACS  [id number]
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
## 2024-04-04 NOTE — CONSULT NOTE ADULT - ATTENDING COMMENTS
Patient examined by me at approximately 0030.   83 year old woman brought in by relatives with complaint of R labial and thigh swelling first noticed about 3 days ago.  No known history of trauma.   PMH significant for CAD s/p stents > 1 year ago. Currently on Brilinta.     Vitals stable.   R perineum and posterior thigh with moderate swelling and mild ecchymosis.   Compartments soft.     Hb stable.     CT shows right peroneal/buttock edema with heterogeneity and asymmetric enlargement of the right hip abductor muscles, extending to the level of the distal femoral diaphysis.     A/P: Possible spontaneous hematoma on high dose antiplatelet vs trauma with unknown mechanism. No evidence of active bleeding.   -Hold Brilinta and chemical AC.   -Trend CBC.  -Trauma to follow.

## 2024-04-04 NOTE — ED ADULT NURSE NOTE - OBJECTIVE STATEMENT
patient is an 84 y/o F with hx of CAD on brilinta and dementia BIBEMS from home accompanied by aide for a wound check. per the aide at the bedside on monday she noticed small bruising on the right buttocks which has now spread up to the vagina and to the right side of her abdomen. patient with green/yellow bruising noted to the right thigh and red/purple bruising to the pubic area and buttocks. per aide at bedside patient has no recent falls/traumas. patient with 24 hour aide care at home. patient is a&ox1 (to self). respirations even and unlabored. abdomen soft, nondistended. patient with pressure wound to right heel. patient c/o pain to right leg. MD Macdonald at bedside to assess patient. patient and aide at bedside updated on plan of care. bed locked and placed in lowest position. comfort and safety maintained

## 2024-04-04 NOTE — ED PROVIDER NOTE - ATTENDING CONTRIBUTION TO CARE
I performed a history and physical exam of the patient and discussed their management with the resident. I reviewed the resident's note and agree with the documented findings and plan of care.  Paris Macdonald MD

## 2024-04-04 NOTE — H&P ADULT - PROBLEM SELECTOR PLAN 4
- Per HIE note, patient currently on Lantus 6u QHS + ISS before meals  - Will do LAntus 3u QHS + SAMUEL while admitted - Currently on Lantus 4u QHS + humalog ISS at home  - C/w Lantus 4u QHS + SAMUEL while inpatient

## 2024-04-04 NOTE — H&P ADULT - NSHPPHYSICALEXAM_GEN_ALL_CORE
Vital Signs Last 24 Hrs  T(C): 36.2 (04 Apr 2024 20:20), Max: 36.4 (04 Apr 2024 15:38)  T(F): 97.2 (04 Apr 2024 20:20), Max: 97.5 (04 Apr 2024 15:38)  HR: 90 (04 Apr 2024 20:20) (90 - 96)  BP: 119/72 (04 Apr 2024 20:20) (119/72 - 141/81)  BP(mean): --  RR: 16 (04 Apr 2024 20:20) (16 - 20)  SpO2: 95% (04 Apr 2024 20:20) (95% - 98%)    Parameters below as of 04 Apr 2024 20:20  Patient On (Oxygen Delivery Method): room air        CONSTITUTIONAL: Well-groomed, in no apparent distress  EYES: No conjunctival or scleral injection, non-icteric;   ENMT: No external nasal lesions; MMM  NECK: Trachea midline without palpable neck mass; thyroid not enlarged and non-tender  RESPIRATORY: Breathing comfortably; no dullness to percussion; lungs CTA without wheeze/rhonchi/rales  CARDIOVASCULAR: +S1S2, RRR, no M/G/R; pedal pulses full and symmetric; no lower extremity edema  GASTROINTESTINAL: No palpable masses or tenderness, +BS throughout, no rebound/guarding; no hepatosplenomegaly; no hernia palpated  LYMPHATIC: No cervical LAD or tenderness  SKIN: No rashes or ulcers noted  NEUROLOGIC:   PSYCHIATRIC: A+O x 0 Vital Signs Last 24 Hrs  T(C): 36.2 (04 Apr 2024 20:20), Max: 36.4 (04 Apr 2024 15:38)  T(F): 97.2 (04 Apr 2024 20:20), Max: 97.5 (04 Apr 2024 15:38)  HR: 90 (04 Apr 2024 20:20) (90 - 96)  BP: 119/72 (04 Apr 2024 20:20) (119/72 - 141/81)  BP(mean): --  RR: 16 (04 Apr 2024 20:20) (16 - 20)  SpO2: 95% (04 Apr 2024 20:20) (95% - 98%)    Parameters below as of 04 Apr 2024 20:20  Patient On (Oxygen Delivery Method): room air        CONSTITUTIONAL: Well-groomed, intermittently moaning  EYES: No conjunctival or scleral injection, non-icteric; PERRL  ENMT: Patient hard of hearing  NECK: Trachea midline without palpable neck mass  RESPIRATORY: Breathing comfortably; no dullness to percussion; lungs CTA without wheeze/rhonchi/rales  CARDIOVASCULAR: +S1S2, RRR, no M/G/R; no lower extremity edema  GASTROINTESTINAL: Tenderness to palpation and bruising noted on RLQ, extending to the side, +BS throughout, no rebound/guarding  : R labial swelling noted  SKIN: Purplish bruising noted on RLQ going to the back, as well as on R labia, extending to R perineum; bruise also noted on R forearm  MSK: Bilateral LEs contracted, patient verbalizes "that hurts" when touching R hip  NEUROLOGIC: Unable to do full neurological exam given patient not fully interpreting all commands, but freely moving bilateral UEs, endorses intact sensation everywhere  PSYCHIATRIC: Awake, alert, oriented to person, but not place or time Vital Signs Last 24 Hrs  T(C): 36.2 (04 Apr 2024 20:20), Max: 36.4 (04 Apr 2024 15:38)  T(F): 97.2 (04 Apr 2024 20:20), Max: 97.5 (04 Apr 2024 15:38)  HR: 90 (04 Apr 2024 20:20) (90 - 96)  BP: 119/72 (04 Apr 2024 20:20) (119/72 - 141/81)  BP(mean): --  RR: 16 (04 Apr 2024 20:20) (16 - 20)  SpO2: 95% (04 Apr 2024 20:20) (95% - 98%)    Parameters below as of 04 Apr 2024 20:20  Patient On (Oxygen Delivery Method): room air        CONSTITUTIONAL: Well-groomed, intermittently moaning  EYES: No conjunctival or scleral injection, non-icteric; PERRL  ENMT: Patient hard of hearing  NECK: Trachea midline without palpable neck mass  RESPIRATORY: Breathing comfortably; no dullness to percussion; lungs CTA without wheeze/rhonchi/rales  CARDIOVASCULAR: +S1S2, regular rate, irregularly irregular rhythm, no M/G/R; no lower extremity edema  GASTROINTESTINAL: Tenderness to palpation and bruising noted on RLQ, extending to the side, +BS throughout, no rebound/guarding  : R labial swelling noted  SKIN: Purplish bruising noted on RLQ going to the back, as well as on R labia, extending to R perineum; bruise also noted on R forearm  MSK: Bilateral LEs contracted, patient verbalizes "that hurts" when touching R hip  NEUROLOGIC: Unable to do full neurological exam given patient not fully interpreting all commands, but freely moving bilateral UEs, endorses intact sensation everywhere  PSYCHIATRIC: Awake, alert, oriented to person, but not place or time

## 2024-04-05 LAB
A1C WITH ESTIMATED AVERAGE GLUCOSE RESULT: 6.1 % — HIGH (ref 4–5.6)
ALBUMIN SERPL ELPH-MCNC: 2.6 G/DL — LOW (ref 3.3–5)
ALP SERPL-CCNC: 76 U/L — SIGNIFICANT CHANGE UP (ref 40–120)
ALT FLD-CCNC: 23 U/L — SIGNIFICANT CHANGE UP (ref 10–45)
ANION GAP SERPL CALC-SCNC: 7 MMOL/L — SIGNIFICANT CHANGE UP (ref 5–17)
APTT BLD: 25.8 SEC — SIGNIFICANT CHANGE UP (ref 24.5–35.6)
AST SERPL-CCNC: 34 U/L — SIGNIFICANT CHANGE UP (ref 10–40)
BILIRUB SERPL-MCNC: 0.7 MG/DL — SIGNIFICANT CHANGE UP (ref 0.2–1.2)
BUN SERPL-MCNC: 8 MG/DL — SIGNIFICANT CHANGE UP (ref 7–23)
CALCIUM SERPL-MCNC: 8 MG/DL — LOW (ref 8.4–10.5)
CHLORIDE SERPL-SCNC: 102 MMOL/L — SIGNIFICANT CHANGE UP (ref 96–108)
CO2 SERPL-SCNC: 27 MMOL/L — SIGNIFICANT CHANGE UP (ref 22–31)
CREAT SERPL-MCNC: 0.38 MG/DL — LOW (ref 0.5–1.3)
CULTURE RESULTS: NO GROWTH — SIGNIFICANT CHANGE UP
EGFR: 98 ML/MIN/1.73M2 — SIGNIFICANT CHANGE UP
ESTIMATED AVERAGE GLUCOSE: 128 MG/DL — HIGH (ref 68–114)
GLUCOSE BLDC GLUCOMTR-MCNC: 111 MG/DL — HIGH (ref 70–99)
GLUCOSE BLDC GLUCOMTR-MCNC: 142 MG/DL — HIGH (ref 70–99)
GLUCOSE BLDC GLUCOMTR-MCNC: 144 MG/DL — HIGH (ref 70–99)
GLUCOSE BLDC GLUCOMTR-MCNC: 147 MG/DL — HIGH (ref 70–99)
GLUCOSE BLDC GLUCOMTR-MCNC: 89 MG/DL — SIGNIFICANT CHANGE UP (ref 70–99)
GLUCOSE BLDC GLUCOMTR-MCNC: 96 MG/DL — SIGNIFICANT CHANGE UP (ref 70–99)
GLUCOSE SERPL-MCNC: 96 MG/DL — SIGNIFICANT CHANGE UP (ref 70–99)
HCT VFR BLD CALC: 28.7 % — LOW (ref 34.5–45)
HCT VFR BLD CALC: 29.5 % — LOW (ref 34.5–45)
HCT VFR BLD CALC: 29.7 % — LOW (ref 34.5–45)
HGB BLD-MCNC: 9.4 G/DL — LOW (ref 11.5–15.5)
HGB BLD-MCNC: 9.4 G/DL — LOW (ref 11.5–15.5)
HGB BLD-MCNC: 9.5 G/DL — LOW (ref 11.5–15.5)
INR BLD: 0.98 RATIO — SIGNIFICANT CHANGE UP (ref 0.85–1.18)
MCHC RBC-ENTMCNC: 28.5 PG — SIGNIFICANT CHANGE UP (ref 27–34)
MCHC RBC-ENTMCNC: 28.7 PG — SIGNIFICANT CHANGE UP (ref 27–34)
MCHC RBC-ENTMCNC: 28.9 PG — SIGNIFICANT CHANGE UP (ref 27–34)
MCHC RBC-ENTMCNC: 31.9 GM/DL — LOW (ref 32–36)
MCHC RBC-ENTMCNC: 32 GM/DL — SIGNIFICANT CHANGE UP (ref 32–36)
MCHC RBC-ENTMCNC: 32.8 GM/DL — SIGNIFICANT CHANGE UP (ref 32–36)
MCV RBC AUTO: 88.3 FL — SIGNIFICANT CHANGE UP (ref 80–100)
MCV RBC AUTO: 89.2 FL — SIGNIFICANT CHANGE UP (ref 80–100)
MCV RBC AUTO: 90.2 FL — SIGNIFICANT CHANGE UP (ref 80–100)
NRBC # BLD: 0 /100 WBCS — SIGNIFICANT CHANGE UP (ref 0–0)
PLATELET # BLD AUTO: 307 K/UL — SIGNIFICANT CHANGE UP (ref 150–400)
PLATELET # BLD AUTO: 342 K/UL — SIGNIFICANT CHANGE UP (ref 150–400)
PLATELET # BLD AUTO: 345 K/UL — SIGNIFICANT CHANGE UP (ref 150–400)
POTASSIUM SERPL-MCNC: 4 MMOL/L — SIGNIFICANT CHANGE UP (ref 3.5–5.3)
POTASSIUM SERPL-SCNC: 4 MMOL/L — SIGNIFICANT CHANGE UP (ref 3.5–5.3)
PROT SERPL-MCNC: 5.2 G/DL — LOW (ref 6–8.3)
PROTHROM AB SERPL-ACNC: 10.8 SEC — SIGNIFICANT CHANGE UP (ref 9.5–13)
RBC # BLD: 3.25 M/UL — LOW (ref 3.8–5.2)
RBC # BLD: 3.27 M/UL — LOW (ref 3.8–5.2)
RBC # BLD: 3.33 M/UL — LOW (ref 3.8–5.2)
RBC # FLD: 14.7 % — HIGH (ref 10.3–14.5)
RBC # FLD: 15.1 % — HIGH (ref 10.3–14.5)
RBC # FLD: 15.2 % — HIGH (ref 10.3–14.5)
SODIUM SERPL-SCNC: 136 MMOL/L — SIGNIFICANT CHANGE UP (ref 135–145)
SPECIMEN SOURCE: SIGNIFICANT CHANGE UP
WBC # BLD: 6.9 K/UL — SIGNIFICANT CHANGE UP (ref 3.8–10.5)
WBC # BLD: 8.42 K/UL — SIGNIFICANT CHANGE UP (ref 3.8–10.5)
WBC # BLD: 8.49 K/UL — SIGNIFICANT CHANGE UP (ref 3.8–10.5)
WBC # FLD AUTO: 6.9 K/UL — SIGNIFICANT CHANGE UP (ref 3.8–10.5)
WBC # FLD AUTO: 8.42 K/UL — SIGNIFICANT CHANGE UP (ref 3.8–10.5)
WBC # FLD AUTO: 8.49 K/UL — SIGNIFICANT CHANGE UP (ref 3.8–10.5)

## 2024-04-05 PROCEDURE — 99232 SBSQ HOSP IP/OBS MODERATE 35: CPT

## 2024-04-05 PROCEDURE — 70450 CT HEAD/BRAIN W/O DYE: CPT | Mod: 26

## 2024-04-05 PROCEDURE — 93010 ELECTROCARDIOGRAM REPORT: CPT

## 2024-04-05 RX ORDER — OXYCODONE HYDROCHLORIDE 5 MG/1
5 TABLET ORAL EVERY 4 HOURS
Refills: 0 | Status: DISCONTINUED | OUTPATIENT
Start: 2024-04-05 | End: 2024-04-11

## 2024-04-05 RX ORDER — MORPHINE SULFATE 50 MG/1
2 CAPSULE, EXTENDED RELEASE ORAL EVERY 4 HOURS
Refills: 0 | Status: DISCONTINUED | OUTPATIENT
Start: 2024-04-05 | End: 2024-04-05

## 2024-04-05 RX ORDER — OXYCODONE HYDROCHLORIDE 5 MG/1
2.5 TABLET ORAL EVERY 4 HOURS
Refills: 0 | Status: DISCONTINUED | OUTPATIENT
Start: 2024-04-05 | End: 2024-04-07

## 2024-04-05 RX ADMIN — OXYCODONE HYDROCHLORIDE 5 MILLIGRAM(S): 5 TABLET ORAL at 18:32

## 2024-04-05 RX ADMIN — MEMANTINE HYDROCHLORIDE 10 MILLIGRAM(S): 10 TABLET ORAL at 00:22

## 2024-04-05 RX ADMIN — QUETIAPINE FUMARATE 25 MILLIGRAM(S): 200 TABLET, FILM COATED ORAL at 00:19

## 2024-04-05 RX ADMIN — FAMOTIDINE 20 MILLIGRAM(S): 10 INJECTION INTRAVENOUS at 12:58

## 2024-04-05 RX ADMIN — Medication 500 MILLIGRAM(S): at 10:08

## 2024-04-05 RX ADMIN — QUETIAPINE FUMARATE 25 MILLIGRAM(S): 200 TABLET, FILM COATED ORAL at 05:06

## 2024-04-05 RX ADMIN — Medication 1 CAPSULE(S): at 10:08

## 2024-04-05 RX ADMIN — SERTRALINE 25 MILLIGRAM(S): 25 TABLET, FILM COATED ORAL at 00:19

## 2024-04-05 RX ADMIN — Medication 325 MILLIGRAM(S): at 12:58

## 2024-04-05 RX ADMIN — Medication 1 TABLET(S): at 12:58

## 2024-04-05 RX ADMIN — SERTRALINE 25 MILLIGRAM(S): 25 TABLET, FILM COATED ORAL at 22:03

## 2024-04-05 RX ADMIN — Medication 1 GRAM(S): at 22:04

## 2024-04-05 RX ADMIN — Medication 1 CAPSULE(S): at 12:58

## 2024-04-05 RX ADMIN — PREGABALIN 1000 MICROGRAM(S): 225 CAPSULE ORAL at 12:58

## 2024-04-05 RX ADMIN — OXYCODONE HYDROCHLORIDE 2.5 MILLIGRAM(S): 5 TABLET ORAL at 01:37

## 2024-04-05 RX ADMIN — MEMANTINE HYDROCHLORIDE 10 MILLIGRAM(S): 10 TABLET ORAL at 05:06

## 2024-04-05 RX ADMIN — FAMOTIDINE 20 MILLIGRAM(S): 10 INJECTION INTRAVENOUS at 00:18

## 2024-04-05 RX ADMIN — OXYCODONE HYDROCHLORIDE 2.5 MILLIGRAM(S): 5 TABLET ORAL at 12:57

## 2024-04-05 RX ADMIN — OXYCODONE HYDROCHLORIDE 2.5 MILLIGRAM(S): 5 TABLET ORAL at 05:56

## 2024-04-05 RX ADMIN — MEMANTINE HYDROCHLORIDE 10 MILLIGRAM(S): 10 TABLET ORAL at 18:32

## 2024-04-05 RX ADMIN — OXYCODONE HYDROCHLORIDE 2.5 MILLIGRAM(S): 5 TABLET ORAL at 06:47

## 2024-04-05 RX ADMIN — QUETIAPINE FUMARATE 25 MILLIGRAM(S): 200 TABLET, FILM COATED ORAL at 18:32

## 2024-04-05 RX ADMIN — OXYCODONE HYDROCHLORIDE 2.5 MILLIGRAM(S): 5 TABLET ORAL at 02:45

## 2024-04-05 RX ADMIN — PANTOPRAZOLE SODIUM 40 MILLIGRAM(S): 20 TABLET, DELAYED RELEASE ORAL at 05:06

## 2024-04-05 RX ADMIN — INSULIN GLARGINE 4 UNIT(S): 100 INJECTION, SOLUTION SUBCUTANEOUS at 22:03

## 2024-04-05 RX ADMIN — Medication 1 CAPSULE(S): at 18:32

## 2024-04-05 RX ADMIN — INSULIN GLARGINE 4 UNIT(S): 100 INJECTION, SOLUTION SUBCUTANEOUS at 00:20

## 2024-04-05 RX ADMIN — Medication 1000 UNIT(S): at 12:58

## 2024-04-05 RX ADMIN — OXYCODONE HYDROCHLORIDE 2.5 MILLIGRAM(S): 5 TABLET ORAL at 14:00
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
## 2024-04-05 NOTE — CONSULT NOTE ADULT - ASSESSMENT
83 year old patient with right heel ulcer  - Patient seen and evaluated  - Heel ulcer noted with no signs of infection   - Recommend z flow boots in bed at all times  - Recommend daily dressing changes with iodosorb gel and covering with allevyn foam, change daily  - Will monitor periodically during this admission, reconsult sooner as needed 83 year old patient with right heel ulcer  - Patient seen and evaluated  - Heel ulcer noted with no signs of infection   - Strict decubitus precaution in bed at all times  - Recommend daily dressing changes with iodosorb gel and covering with allevyn foam, change daily  - Will monitor periodically during this admission, reconsult sooner as needed

## 2024-04-05 NOTE — CONSULT NOTE ADULT - SUBJECTIVE AND OBJECTIVE BOX
Patient is a 83y old  Female who presents with a chief complaint of Intramuscular hematoma (05 Apr 2024 13:36)      HPI:  History obtained by patient's daughter at bedside, Shirley. Patient is hard of hearing but does answer some questions, moaning intermittently. 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 She developed bruising on the labia on Monday around 3 days ago, but now there has been significant expansion of the bruising today with spread to the right thigh and abdominal area. She had an ultrasound done on Monday which showed a solid mixed cystic mass 4x8cm in the right thigh most likely representing a hematoma. The patient's PCP upon receiving the result, called the daughter and told her to bring her to the hospital. Had a CBC done on 4/2 which showed an Hb of 11.8. The patient lives at home with an aide, has 2 daughters who visits her. Per daughter at bedside, there has been no history of falls at home, however she is known to bruise very easily. Daughter states that patient has a bruise on her right arm that she saw happen when the aide gripped her wrist, stated that it didn't look like a lot of force was used. She believes the hematoma may have happened possibly from the patient being transferred from her bed to her wheelchair as that's used to move the patient. Patient was taken off of ASA 2-3 years ago and has is not on any full-dose AC. Brought to the ED for further evaluation. At bedside the patient is moaning intermittently, but does know her name, responds to some questions if asked loudly, and when her hip was touched, stated that it hurts. Daughter states that the patient usually knows her name and if she is in her house, doesn't really know she is in a hospital, has very bad short-term memory.    In the ED, she was afebrile and hemodynamically stable, saturating well on room air. CBC w/ normocytic anemia of 10, coags and CMP wnl except for hyperglycemia and hypoalbuminemia of 2.8. UA wnl. CT abdomen/pelvis showing R peroneal/buttock edema w/ heterogeneity and asymmetric enlargement of the R hip adductor muscles extending to the level of the distal femoral diaphysis, differential being IM hematoma w/ overlying contusion vs cellulitis w/ muscular extension and phlegmonous change. Age-indeterminate mod-severe T11 and L1 compression deformities also seen (documented to be present in HIE note in 2019). Was given IV tylenol, Vanc/Zosyn/Clindamycin in the ED.   (04 Apr 2024 21:21)      PAST MEDICAL & SURGICAL HISTORY:  CAD (coronary artery disease)      Dementia      Pancreatic cancer      Diabetes mellitus      GERD (gastroesophageal reflux disease)      H/O Whipple procedure          MEDICATIONS  (STANDING):  ascorbic acid 500 milliGRAM(s) Oral <User Schedule>  calcium carbonate   1250 mG (OsCal) 1 Tablet(s) Oral daily  cholecalciferol 1000 Unit(s) Oral daily  cyanocobalamin 1000 MICROGram(s) Oral daily  dextrose 10% Bolus 125 milliLiter(s) IV Bolus once  dextrose 5%. 1000 milliLiter(s) (100 mL/Hr) IV Continuous <Continuous>  dextrose 5%. 1000 milliLiter(s) (50 mL/Hr) IV Continuous <Continuous>  dextrose 50% Injectable 12.5 Gram(s) IV Push once  dextrose 50% Injectable 25 Gram(s) IV Push once  famotidine    Tablet 20 milliGRAM(s) Oral daily  ferrous    sulfate 325 milliGRAM(s) Oral daily  glucagon  Injectable 1 milliGRAM(s) IntraMuscular once  insulin glargine Injectable (LANTUS) 4 Unit(s) SubCutaneous at bedtime  insulin lispro (ADMELOG) corrective regimen sliding scale   SubCutaneous three times a day before meals  memantine 10 milliGRAM(s) Oral two times a day  methenamine hippurate 1 Gram(s) Oral <User Schedule>  pancrelipase  (CREON 24,000 Lipase Units) 1 Capsule(s) Oral three times a day with meals  pantoprazole    Tablet 40 milliGRAM(s) Oral before breakfast  QUEtiapine 25 milliGRAM(s) Oral two times a day  sertraline 25 milliGRAM(s) Oral at bedtime    MEDICATIONS  (PRN):  acetaminophen     Tablet .. 650 milliGRAM(s) Oral every 6 hours PRN Temp greater or equal to 38C (100.4F), Mild Pain (1 - 3)  albuterol/ipratropium for Nebulization 3 milliLiter(s) Nebulizer every 6 hours PRN Shortness of Breath and/or Wheezing  dextrose Oral Gel 15 Gram(s) Oral once PRN Blood Glucose LESS THAN 70 milliGRAM(s)/deciliter  melatonin 3 milliGRAM(s) Oral at bedtime PRN Insomnia  oxyCODONE    IR 2.5 milliGRAM(s) Oral every 4 hours PRN Moderate Pain (4 - 6)  oxyCODONE    IR 5 milliGRAM(s) Oral every 4 hours PRN Severe Pain (7 - 10)      Allergies    No Known Allergies    Intolerances        VITALS:    Vital Signs Last 24 Hrs  T(C): 36.3 (06 Apr 2024 00:14), Max: 37 (05 Apr 2024 15:45)  T(F): 97.4 (06 Apr 2024 00:14), Max: 98.6 (05 Apr 2024 15:45)  HR: 91 (06 Apr 2024 00:14) (85 - 108)  BP: 144/76 (06 Apr 2024 00:14) (127/67 - 158/77)  BP(mean): --  RR: 18 (06 Apr 2024 00:14) (18 - 18)  SpO2: 95% (06 Apr 2024 00:14) (93% - 98%)    Parameters below as of 06 Apr 2024 00:14  Patient On (Oxygen Delivery Method): room air        LABS:                          9.5    8.49  )-----------( 345      ( 05 Apr 2024 21:01 )             29.7       04-05    136  |  102  |  8   ----------------------------<  96  4.0   |  27  |  0.38<L>    Ca    8.0<L>      05 Apr 2024 07:13    TPro  5.2<L>  /  Alb  2.6<L>  /  TBili  0.7  /  DBili  x   /  AST  34  /  ALT  23  /  AlkPhos  76  04-05      CAPILLARY BLOOD GLUCOSE      POCT Blood Glucose.: 144 mg/dL (05 Apr 2024 21:41)  POCT Blood Glucose.: 111 mg/dL (05 Apr 2024 17:21)  POCT Blood Glucose.: 147 mg/dL (05 Apr 2024 12:39)  POCT Blood Glucose.: 96 mg/dL (05 Apr 2024 08:59)      PT/INR - ( 05 Apr 2024 07:10 )   PT: 10.8 sec;   INR: 0.98 ratio         PTT - ( 05 Apr 2024 07:10 )  PTT:25.8 sec    LOWER EXTREMITY PHYSICAL EXAM:    Vascular: DP/PT 0/4, B/L, CFT <3 seconds B/L, Temperature gradient WNL, B/L.   Neuro: unable to assess  Musculoskeletal/Ortho: pain with palp of right heel   Skin: right heel ulcer to subQ with no acute signs of infection secondary to pressure present on admission, chronic in nature, measuring 3x3x0.5 cm in size

## 2024-04-05 NOTE — PROGRESS NOTE ADULT - SUBJECTIVE AND OBJECTIVE BOX
Surgery Daily Progress Note    Subjective:   Patient seen at bedside this AM.     24h Events:   - Overnight, no acute events    Objective:  Vital Signs  T(C): 36.2 (04-05 @ 11:11), Max: 36.4 (04-04 @ 15:38)  HR: 93 (04-05 @ 11:11) (80 - 108)  BP: 127/67 (04-05 @ 11:11) (119/72 - 159/94)  RR: 18 (04-05 @ 11:11) (16 - 20)  SpO2: 96% (04-05 @ 11:11) (95% - 98%)  04-04-24 @ 07:01  -  04-05-24 @ 07:00  --------------------------------------------------------  IN:  Total IN: 0 mL    OUT:    Voided (mL): 200 mL  Total OUT: 200 mL    Total NET: -200 mL          Physical Exam:  GEN: resting in bed comfortably in NAD  RESP: no increased WOB  ABD: soft, non-distended, non-tender without rebound tenderness or guarding  EXTR: warm, well-perfused without gross deformities; spontaneous movement in b/l U/L extrem. R groin ecchymosis noted with firmness over medial leg.   NEURO: AAOx0    Labs:                        9.4    8.42  )-----------( 342      ( 05 Apr 2024 12:49 )             29.5   04-05    136  |  102  |  8   ----------------------------<  96  4.0   |  27  |  0.38<L>    Ca    8.0<L>      05 Apr 2024 07:13    TPro  5.2<L>  /  Alb  2.6<L>  /  TBili  0.7  /  DBili  x   /  AST  34  /  ALT  23  /  AlkPhos  76  04-05    CAPILLARY BLOOD GLUCOSE  146 (04 Apr 2024 23:59)      POCT Blood Glucose.: 147 mg/dL (05 Apr 2024 12:39)  POCT Blood Glucose.: 96 mg/dL (05 Apr 2024 08:59)  POCT Blood Glucose.: 89 mg/dL (05 Apr 2024 00:12)  POCT Blood Glucose.: 142 mg/dL (04 Apr 2024 23:59)  POCT Blood Glucose.: 218 mg/dL (04 Apr 2024 19:53)  POCT Blood Glucose.: 253 mg/dL (04 Apr 2024 15:05)      Medications:   MEDICATIONS  (STANDING):  ascorbic acid 500 milliGRAM(s) Oral <User Schedule>  calcium carbonate   1250 mG (OsCal) 1 Tablet(s) Oral daily  cholecalciferol 1000 Unit(s) Oral daily  cyanocobalamin 1000 MICROGram(s) Oral daily  dextrose 10% Bolus 125 milliLiter(s) IV Bolus once  dextrose 5%. 1000 milliLiter(s) (100 mL/Hr) IV Continuous <Continuous>  dextrose 5%. 1000 milliLiter(s) (50 mL/Hr) IV Continuous <Continuous>  dextrose 50% Injectable 12.5 Gram(s) IV Push once  dextrose 50% Injectable 25 Gram(s) IV Push once  famotidine    Tablet 20 milliGRAM(s) Oral daily  ferrous    sulfate 325 milliGRAM(s) Oral daily  glucagon  Injectable 1 milliGRAM(s) IntraMuscular once  insulin glargine Injectable (LANTUS) 4 Unit(s) SubCutaneous at bedtime  insulin lispro (ADMELOG) corrective regimen sliding scale   SubCutaneous three times a day before meals  memantine 10 milliGRAM(s) Oral two times a day  methenamine hippurate 1 Gram(s) Oral <User Schedule>  pancrelipase  (CREON 24,000 Lipase Units) 1 Capsule(s) Oral three times a day with meals  pantoprazole    Tablet 40 milliGRAM(s) Oral before breakfast  QUEtiapine 25 milliGRAM(s) Oral two times a day  sertraline 25 milliGRAM(s) Oral at bedtime    MEDICATIONS  (PRN):  acetaminophen     Tablet .. 650 milliGRAM(s) Oral every 6 hours PRN Temp greater or equal to 38C (100.4F), Mild Pain (1 - 3)  albuterol/ipratropium for Nebulization 3 milliLiter(s) Nebulizer every 6 hours PRN Shortness of Breath and/or Wheezing  dextrose Oral Gel 15 Gram(s) Oral once PRN Blood Glucose LESS THAN 70 milliGRAM(s)/deciliter  melatonin 3 milliGRAM(s) Oral at bedtime PRN Insomnia  oxyCODONE    IR 2.5 milliGRAM(s) Oral every 4 hours PRN Moderate Pain (4 - 6)  oxyCODONE    IR 5 milliGRAM(s) Oral every 4 hours PRN Severe Pain (7 - 10)

## 2024-04-05 NOTE — PROGRESS NOTE ADULT - SUBJECTIVE AND OBJECTIVE BOX
Patient is a 83y old  Female who presents with a chief complaint of Intramuscular hematoma (05 Apr 2024 08:23)      SUBJECTIVE / OVERNIGHT EVENTS: Patient seen and examined at bedside. No acute events overnight. Patient examined with britney Navarro (PCA). Seems to be at baseline mentation. Some pain to palpation in right thigh.    MEDICATIONS  (STANDING):  ascorbic acid 500 milliGRAM(s) Oral <User Schedule>  calcium carbonate   1250 mG (OsCal) 1 Tablet(s) Oral daily  cholecalciferol 1000 Unit(s) Oral daily  cyanocobalamin 1000 MICROGram(s) Oral daily  dextrose 10% Bolus 125 milliLiter(s) IV Bolus once  dextrose 5%. 1000 milliLiter(s) (100 mL/Hr) IV Continuous <Continuous>  dextrose 5%. 1000 milliLiter(s) (50 mL/Hr) IV Continuous <Continuous>  dextrose 50% Injectable 12.5 Gram(s) IV Push once  dextrose 50% Injectable 25 Gram(s) IV Push once  famotidine    Tablet 20 milliGRAM(s) Oral daily  ferrous    sulfate 325 milliGRAM(s) Oral daily  glucagon  Injectable 1 milliGRAM(s) IntraMuscular once  insulin glargine Injectable (LANTUS) 4 Unit(s) SubCutaneous at bedtime  insulin lispro (ADMELOG) corrective regimen sliding scale   SubCutaneous three times a day before meals  memantine 10 milliGRAM(s) Oral two times a day  methenamine hippurate 1 Gram(s) Oral <User Schedule>  pancrelipase  (CREON 24,000 Lipase Units) 1 Capsule(s) Oral three times a day with meals  pantoprazole    Tablet 40 milliGRAM(s) Oral before breakfast  QUEtiapine 25 milliGRAM(s) Oral two times a day  sertraline 25 milliGRAM(s) Oral at bedtime    MEDICATIONS  (PRN):  acetaminophen     Tablet .. 650 milliGRAM(s) Oral every 6 hours PRN Temp greater or equal to 38C (100.4F), Mild Pain (1 - 3)  albuterol/ipratropium for Nebulization 3 milliLiter(s) Nebulizer every 6 hours PRN Shortness of Breath and/or Wheezing  dextrose Oral Gel 15 Gram(s) Oral once PRN Blood Glucose LESS THAN 70 milliGRAM(s)/deciliter  melatonin 3 milliGRAM(s) Oral at bedtime PRN Insomnia  morphine  - Injectable 2 milliGRAM(s) IV Push every 4 hours PRN Severe Pain (7 - 10)  oxyCODONE    IR 2.5 milliGRAM(s) Oral every 4 hours PRN Moderate Pain (4 - 6)      CAPILLARY BLOOD GLUCOSE  146 (04 Apr 2024 23:59)      POCT Blood Glucose.: 96 mg/dL (05 Apr 2024 08:59)  POCT Blood Glucose.: 89 mg/dL (05 Apr 2024 00:12)  POCT Blood Glucose.: 142 mg/dL (04 Apr 2024 23:59)  POCT Blood Glucose.: 218 mg/dL (04 Apr 2024 19:53)  POCT Blood Glucose.: 253 mg/dL (04 Apr 2024 15:05)    I&O's Summary    04 Apr 2024 07:01  -  05 Apr 2024 07:00  --------------------------------------------------------  IN: 0 mL / OUT: 200 mL / NET: -200 mL        PHYSICAL EXAM:  Vital Signs Last 24 Hrs  T(C): 36.3 (05 Apr 2024 04:21), Max: 36.4 (04 Apr 2024 15:38)  T(F): 97.3 (05 Apr 2024 04:21), Max: 97.6 (04 Apr 2024 23:36)  HR: 108 (05 Apr 2024 07:57) (80 - 108)  BP: 132/74 (05 Apr 2024 07:57) (119/72 - 159/94)  BP(mean): --  RR: 18 (05 Apr 2024 07:57) (16 - 20)  SpO2: 96% (05 Apr 2024 07:57) (95% - 98%)    Parameters below as of 05 Apr 2024 07:57  Patient On (Oxygen Delivery Method): room air        GEN: female in NAD, appears comfortable, no diaphoresis  EYES: No scleral injection, EOMI  ENTM: neck supple & symmetric without tracheal deviation, moist membranes, no gross hearing impairment, thyroid gland not enlarged  CV: +S1/S2, no m/r/g, no abdominal bruit, no LE edema  RESP: breathing comfortably, no respiratory accessory muscle use, CTAB, no w/r/r  GI: normoactive BS, soft, NTND, no rebounding/guarding, no palpable masses    LABS:                        9.4    6.90  )-----------( 307      ( 05 Apr 2024 07:18 )             28.7     04-05    136  |  102  |  8   ----------------------------<  96  4.0   |  27  |  0.38<L>    Ca    8.0<L>      05 Apr 2024 07:13    TPro  5.2<L>  /  Alb  2.6<L>  /  TBili  0.7  /  DBili  x   /  AST  34  /  ALT  23  /  AlkPhos  76  04-05    PT/INR - ( 05 Apr 2024 07:10 )   PT: 10.8 sec;   INR: 0.98 ratio         PTT - ( 05 Apr 2024 07:10 )  PTT:25.8 sec      Urinalysis Basic - ( 05 Apr 2024 07:13 )    Color: x / Appearance: x / SG: x / pH: x  Gluc: 96 mg/dL / Ketone: x  / Bili: x / Urobili: x   Blood: x / Protein: x / Nitrite: x   Leuk Esterase: x / RBC: x / WBC x   Sq Epi: x / Non Sq Epi: x / Bacteria: x          RADIOLOGY & ADDITIONAL TESTS:  Results Reviewed:   Imaging Personally Reviewed:  Electrocardiogram Personally Reviewed:    COORDINATION OF CARE:  Care Discussed with Consultants/Other Providers [Y/N]:  Prior or Outpatient Records Reviewed [Y/N]:

## 2024-04-06 LAB
BLD GP AB SCN SERPL QL: NEGATIVE — SIGNIFICANT CHANGE UP
GLUCOSE BLDC GLUCOMTR-MCNC: 157 MG/DL — HIGH (ref 70–99)
GLUCOSE BLDC GLUCOMTR-MCNC: 170 MG/DL — HIGH (ref 70–99)
GLUCOSE BLDC GLUCOMTR-MCNC: 176 MG/DL — HIGH (ref 70–99)
GLUCOSE BLDC GLUCOMTR-MCNC: 178 MG/DL — HIGH (ref 70–99)
HCT VFR BLD CALC: 34.5 % — SIGNIFICANT CHANGE UP (ref 34.5–45)
HGB BLD-MCNC: 11 G/DL — LOW (ref 11.5–15.5)
MCHC RBC-ENTMCNC: 28.9 PG — SIGNIFICANT CHANGE UP (ref 27–34)
MCHC RBC-ENTMCNC: 31.9 GM/DL — LOW (ref 32–36)
MCV RBC AUTO: 90.8 FL — SIGNIFICANT CHANGE UP (ref 80–100)
NRBC # BLD: 0 /100 WBCS — SIGNIFICANT CHANGE UP (ref 0–0)
PLATELET # BLD AUTO: 483 K/UL — HIGH (ref 150–400)
RBC # BLD: 3.8 M/UL — SIGNIFICANT CHANGE UP (ref 3.8–5.2)
RBC # FLD: 15.3 % — HIGH (ref 10.3–14.5)
RH IG SCN BLD-IMP: POSITIVE — SIGNIFICANT CHANGE UP
WBC # BLD: 12.17 K/UL — HIGH (ref 3.8–10.5)
WBC # FLD AUTO: 12.17 K/UL — HIGH (ref 3.8–10.5)

## 2024-04-06 PROCEDURE — 99233 SBSQ HOSP IP/OBS HIGH 50: CPT

## 2024-04-06 RX ORDER — CADEXOMER IODINE 0.9 %
1 PADS, MEDICATED (EA) TOPICAL DAILY
Refills: 0 | Status: DISCONTINUED | OUTPATIENT
Start: 2024-04-06 | End: 2024-04-14

## 2024-04-06 RX ADMIN — Medication 1 GRAM(S): at 20:04

## 2024-04-06 RX ADMIN — Medication 1000 UNIT(S): at 12:37

## 2024-04-06 RX ADMIN — OXYCODONE HYDROCHLORIDE 2.5 MILLIGRAM(S): 5 TABLET ORAL at 12:43

## 2024-04-06 RX ADMIN — OXYCODONE HYDROCHLORIDE 5 MILLIGRAM(S): 5 TABLET ORAL at 06:12

## 2024-04-06 RX ADMIN — OXYCODONE HYDROCHLORIDE 5 MILLIGRAM(S): 5 TABLET ORAL at 17:42

## 2024-04-06 RX ADMIN — QUETIAPINE FUMARATE 25 MILLIGRAM(S): 200 TABLET, FILM COATED ORAL at 17:39

## 2024-04-06 RX ADMIN — INSULIN GLARGINE 4 UNIT(S): 100 INJECTION, SOLUTION SUBCUTANEOUS at 21:21

## 2024-04-06 RX ADMIN — Medication 1: at 09:15

## 2024-04-06 RX ADMIN — Medication 1 APPLICATION(S): at 17:35

## 2024-04-06 RX ADMIN — Medication 1 CAPSULE(S): at 17:38

## 2024-04-06 RX ADMIN — Medication 1: at 13:11

## 2024-04-06 RX ADMIN — MEMANTINE HYDROCHLORIDE 10 MILLIGRAM(S): 10 TABLET ORAL at 17:39

## 2024-04-06 RX ADMIN — Medication 650 MILLIGRAM(S): at 08:45

## 2024-04-06 RX ADMIN — Medication 1 CAPSULE(S): at 08:46

## 2024-04-06 RX ADMIN — OXYCODONE HYDROCHLORIDE 5 MILLIGRAM(S): 5 TABLET ORAL at 18:30

## 2024-04-06 RX ADMIN — SERTRALINE 25 MILLIGRAM(S): 25 TABLET, FILM COATED ORAL at 21:21

## 2024-04-06 RX ADMIN — Medication 1 CAPSULE(S): at 12:37

## 2024-04-06 RX ADMIN — FAMOTIDINE 20 MILLIGRAM(S): 10 INJECTION INTRAVENOUS at 12:38

## 2024-04-06 RX ADMIN — Medication 325 MILLIGRAM(S): at 12:38

## 2024-04-06 RX ADMIN — Medication 1 TABLET(S): at 12:43

## 2024-04-06 RX ADMIN — QUETIAPINE FUMARATE 25 MILLIGRAM(S): 200 TABLET, FILM COATED ORAL at 05:42

## 2024-04-06 RX ADMIN — PANTOPRAZOLE SODIUM 40 MILLIGRAM(S): 20 TABLET, DELAYED RELEASE ORAL at 05:43

## 2024-04-06 RX ADMIN — Medication 1: at 18:19

## 2024-04-06 RX ADMIN — Medication 650 MILLIGRAM(S): at 09:30

## 2024-04-06 RX ADMIN — PREGABALIN 1000 MICROGRAM(S): 225 CAPSULE ORAL at 12:37

## 2024-04-06 RX ADMIN — MEMANTINE HYDROCHLORIDE 10 MILLIGRAM(S): 10 TABLET ORAL at 05:42

## 2024-04-06 RX ADMIN — OXYCODONE HYDROCHLORIDE 5 MILLIGRAM(S): 5 TABLET ORAL at 05:42

## 2024-04-06 RX ADMIN — OXYCODONE HYDROCHLORIDE 2.5 MILLIGRAM(S): 5 TABLET ORAL at 13:40

## 2024-04-06 NOTE — CHART NOTE - NSCHARTNOTEFT_GEN_A_CORE
H/H stablized with no concern for bleeding. Surgery team to sign off at this time. Please contact if needed further.     ACS/Trauma   [id number]

## 2024-04-06 NOTE — PROGRESS NOTE ADULT - SUBJECTIVE AND OBJECTIVE BOX
Patient is a 83y old  Female who presents with a chief complaint of Intramuscular hematoma (05 Apr 2024 18:49)      SUBJECTIVE / OVERNIGHT EVENTS: Patient had no acute events overnight. Patient seen and examined at bedside this morning. Confused AOx1 (name). Denies pain    ROS: unable to assess    MEDICATIONS  (STANDING):  ascorbic acid 500 milliGRAM(s) Oral <User Schedule>  cadexomer iodine 0.9% Gel 1 Application(s) Topical daily  calcium carbonate   1250 mG (OsCal) 1 Tablet(s) Oral daily  cholecalciferol 1000 Unit(s) Oral daily  cyanocobalamin 1000 MICROGram(s) Oral daily  dextrose 10% Bolus 125 milliLiter(s) IV Bolus once  dextrose 5%. 1000 milliLiter(s) (100 mL/Hr) IV Continuous <Continuous>  dextrose 5%. 1000 milliLiter(s) (50 mL/Hr) IV Continuous <Continuous>  dextrose 50% Injectable 12.5 Gram(s) IV Push once  dextrose 50% Injectable 25 Gram(s) IV Push once  famotidine    Tablet 20 milliGRAM(s) Oral daily  ferrous    sulfate 325 milliGRAM(s) Oral daily  glucagon  Injectable 1 milliGRAM(s) IntraMuscular once  insulin glargine Injectable (LANTUS) 4 Unit(s) SubCutaneous at bedtime  insulin lispro (ADMELOG) corrective regimen sliding scale   SubCutaneous three times a day before meals  memantine 10 milliGRAM(s) Oral two times a day  methenamine hippurate 1 Gram(s) Oral <User Schedule>  pancrelipase  (CREON 24,000 Lipase Units) 1 Capsule(s) Oral three times a day with meals  pantoprazole    Tablet 40 milliGRAM(s) Oral before breakfast  QUEtiapine 25 milliGRAM(s) Oral two times a day  sertraline 25 milliGRAM(s) Oral at bedtime    MEDICATIONS  (PRN):  acetaminophen     Tablet .. 650 milliGRAM(s) Oral every 6 hours PRN Temp greater or equal to 38C (100.4F), Mild Pain (1 - 3)  albuterol/ipratropium for Nebulization 3 milliLiter(s) Nebulizer every 6 hours PRN Shortness of Breath and/or Wheezing  dextrose Oral Gel 15 Gram(s) Oral once PRN Blood Glucose LESS THAN 70 milliGRAM(s)/deciliter  melatonin 3 milliGRAM(s) Oral at bedtime PRN Insomnia  oxyCODONE    IR 5 milliGRAM(s) Oral every 4 hours PRN Severe Pain (7 - 10)  oxyCODONE    IR 2.5 milliGRAM(s) Oral every 4 hours PRN Moderate Pain (4 - 6)      Vital Signs Last 24 Hrs  T(C): 36.9 (06 Apr 2024 11:20), Max: 36.9 (06 Apr 2024 11:20)  T(F): 98.4 (06 Apr 2024 11:20), Max: 98.4 (06 Apr 2024 11:20)  HR: 84 (06 Apr 2024 11:20) (82 - 95)  BP: 131/77 (06 Apr 2024 11:20) (131/71 - 155/77)  BP(mean): --  RR: 18 (06 Apr 2024 11:20) (18 - 18)  SpO2: 96% (06 Apr 2024 11:20) (95% - 96%)    Parameters below as of 06 Apr 2024 11:20  Patient On (Oxygen Delivery Method): room air      CAPILLARY BLOOD GLUCOSE      POCT Blood Glucose.: 170 mg/dL (06 Apr 2024 12:43)  POCT Blood Glucose.: 176 mg/dL (06 Apr 2024 08:46)  POCT Blood Glucose.: 144 mg/dL (05 Apr 2024 21:41)  POCT Blood Glucose.: 111 mg/dL (05 Apr 2024 17:21)    I&O's Summary    05 Apr 2024 07:01  -  06 Apr 2024 07:00  --------------------------------------------------------  IN: 400 mL / OUT: 0 mL / NET: 400 mL    06 Apr 2024 07:01  -  06 Apr 2024 16:18  --------------------------------------------------------  IN: 240 mL / OUT: 0 mL / NET: 240 mL        PHYSICAL EXAM  GENERAL: NAD, lying comfortably in bed, frail  HEENT:  Atraumatic, Normocephalic, conjunctiva and sclera clear, no LAD  CHEST/LUNG: Clear to auscultation bilaterally; No wheeze  HEART: RRR, S1 and S2   ABDOMEN: Soft, Nontender, Nondistended; Bowel sounds present  EXTREMITIES: no le edema  NEURO: AAOx1, non-focal  SKIN: right inner thigh bruising and right labia brusing    LABS:                        11.0   12.17 )-----------( 483      ( 06 Apr 2024 07:07 )             34.5     04-05    136  |  102  |  8   ----------------------------<  96  4.0   |  27  |  0.38<L>    Ca    8.0<L>      05 Apr 2024 07:13    TPro  5.2<L>  /  Alb  2.6<L>  /  TBili  0.7  /  DBili  x   /  AST  34  /  ALT  23  /  AlkPhos  76  04-05    PT/INR - ( 05 Apr 2024 07:10 )   PT: 10.8 sec;   INR: 0.98 ratio         PTT - ( 05 Apr 2024 07:10 )  PTT:25.8 sec      Urinalysis Basic - ( 05 Apr 2024 07:13 )    Color: x / Appearance: x / SG: x / pH: x  Gluc: 96 mg/dL / Ketone: x  / Bili: x / Urobili: x   Blood: x / Protein: x / Nitrite: x   Leuk Esterase: x / RBC: x / WBC x   Sq Epi: x / Non Sq Epi: x / Bacteria: x          RADIOLOGY & ADDITIONAL TESTS:      Labs Personally Reviewed  Imaging Personally Reviewed  Consultant(s) Notes Reviewed

## 2024-04-07 DIAGNOSIS — R65.10 SYSTEMIC INFLAMMATORY RESPONSE SYNDROME (SIRS) OF NON-INFECTIOUS ORIGIN WITHOUT ACUTE ORGAN DYSFUNCTION: ICD-10-CM

## 2024-04-07 LAB
ADD ON TEST-SPECIMEN IN LAB: SIGNIFICANT CHANGE UP
APPEARANCE UR: CLEAR — SIGNIFICANT CHANGE UP
BACTERIA # UR AUTO: NEGATIVE /HPF — SIGNIFICANT CHANGE UP
BASOPHILS # BLD AUTO: 0.06 K/UL — SIGNIFICANT CHANGE UP (ref 0–0.2)
BASOPHILS NFR BLD AUTO: 0.4 % — SIGNIFICANT CHANGE UP (ref 0–2)
BILIRUB UR-MCNC: NEGATIVE — SIGNIFICANT CHANGE UP
CAST: 1 /LPF — SIGNIFICANT CHANGE UP (ref 0–4)
COLOR SPEC: YELLOW — SIGNIFICANT CHANGE UP
DIFF PNL FLD: NEGATIVE — SIGNIFICANT CHANGE UP
EOSINOPHIL # BLD AUTO: 0.68 K/UL — HIGH (ref 0–0.5)
EOSINOPHIL NFR BLD AUTO: 4.5 % — SIGNIFICANT CHANGE UP (ref 0–6)
GLUCOSE BLDC GLUCOMTR-MCNC: 133 MG/DL — HIGH (ref 70–99)
GLUCOSE BLDC GLUCOMTR-MCNC: 220 MG/DL — HIGH (ref 70–99)
GLUCOSE BLDC GLUCOMTR-MCNC: 94 MG/DL — SIGNIFICANT CHANGE UP (ref 70–99)
GLUCOSE BLDC GLUCOMTR-MCNC: 94 MG/DL — SIGNIFICANT CHANGE UP (ref 70–99)
GLUCOSE UR QL: NEGATIVE MG/DL — SIGNIFICANT CHANGE UP
HCT VFR BLD CALC: 32.1 % — LOW (ref 34.5–45)
HGB BLD-MCNC: 10.7 G/DL — LOW (ref 11.5–15.5)
IMM GRANULOCYTES NFR BLD AUTO: 0.4 % — SIGNIFICANT CHANGE UP (ref 0–0.9)
KETONES UR-MCNC: NEGATIVE MG/DL — SIGNIFICANT CHANGE UP
LEUKOCYTE ESTERASE UR-ACNC: ABNORMAL
LYMPHOCYTES # BLD AUTO: 18.1 % — SIGNIFICANT CHANGE UP (ref 13–44)
LYMPHOCYTES # BLD AUTO: 2.7 K/UL — SIGNIFICANT CHANGE UP (ref 1–3.3)
MCHC RBC-ENTMCNC: 29.4 PG — SIGNIFICANT CHANGE UP (ref 27–34)
MCHC RBC-ENTMCNC: 33.3 GM/DL — SIGNIFICANT CHANGE UP (ref 32–36)
MCV RBC AUTO: 88.2 FL — SIGNIFICANT CHANGE UP (ref 80–100)
MONOCYTES # BLD AUTO: 1.24 K/UL — HIGH (ref 0–0.9)
MONOCYTES NFR BLD AUTO: 8.3 % — SIGNIFICANT CHANGE UP (ref 2–14)
NEUTROPHILS # BLD AUTO: 10.21 K/UL — HIGH (ref 1.8–7.4)
NEUTROPHILS NFR BLD AUTO: 68.3 % — SIGNIFICANT CHANGE UP (ref 43–77)
NITRITE UR-MCNC: NEGATIVE — SIGNIFICANT CHANGE UP
NRBC # BLD: 0 /100 WBCS — SIGNIFICANT CHANGE UP (ref 0–0)
PH UR: 6.5 — SIGNIFICANT CHANGE UP (ref 5–8)
PLATELET # BLD AUTO: 531 K/UL — HIGH (ref 150–400)
PROT UR-MCNC: NEGATIVE MG/DL — SIGNIFICANT CHANGE UP
RBC # BLD: 3.64 M/UL — LOW (ref 3.8–5.2)
RBC # FLD: 15.5 % — HIGH (ref 10.3–14.5)
RBC CASTS # UR COMP ASSIST: 2 /HPF — SIGNIFICANT CHANGE UP (ref 0–4)
REVIEW: SIGNIFICANT CHANGE UP
SP GR SPEC: 1.01 — SIGNIFICANT CHANGE UP (ref 1–1.03)
SQUAMOUS # UR AUTO: 13 /HPF — HIGH (ref 0–5)
UROBILINOGEN FLD QL: 0.2 MG/DL — SIGNIFICANT CHANGE UP (ref 0.2–1)
WBC # BLD: 14.54 K/UL — HIGH (ref 3.8–10.5)
WBC # FLD AUTO: 14.54 K/UL — HIGH (ref 3.8–10.5)
WBC UR QL: 7 /HPF — HIGH (ref 0–5)

## 2024-04-07 PROCEDURE — 93010 ELECTROCARDIOGRAM REPORT: CPT

## 2024-04-07 PROCEDURE — 99233 SBSQ HOSP IP/OBS HIGH 50: CPT

## 2024-04-07 PROCEDURE — 71045 X-RAY EXAM CHEST 1 VIEW: CPT | Mod: 26

## 2024-04-07 RX ORDER — SODIUM CHLORIDE 9 MG/ML
500 INJECTION INTRAMUSCULAR; INTRAVENOUS; SUBCUTANEOUS ONCE
Refills: 0 | Status: COMPLETED | OUTPATIENT
Start: 2024-04-07 | End: 2024-04-07

## 2024-04-07 RX ORDER — ACETAMINOPHEN 500 MG
1000 TABLET ORAL ONCE
Refills: 0 | Status: COMPLETED | OUTPATIENT
Start: 2024-04-07 | End: 2024-04-07

## 2024-04-07 RX ORDER — SODIUM CHLORIDE 9 MG/ML
500 INJECTION INTRAMUSCULAR; INTRAVENOUS; SUBCUTANEOUS ONCE
Refills: 0 | Status: DISCONTINUED | OUTPATIENT
Start: 2024-04-07 | End: 2024-04-10

## 2024-04-07 RX ADMIN — Medication 500 MILLIGRAM(S): at 11:02

## 2024-04-07 RX ADMIN — OXYCODONE HYDROCHLORIDE 2.5 MILLIGRAM(S): 5 TABLET ORAL at 18:53

## 2024-04-07 RX ADMIN — FAMOTIDINE 20 MILLIGRAM(S): 10 INJECTION INTRAVENOUS at 11:03

## 2024-04-07 RX ADMIN — Medication 325 MILLIGRAM(S): at 11:03

## 2024-04-07 RX ADMIN — QUETIAPINE FUMARATE 25 MILLIGRAM(S): 200 TABLET, FILM COATED ORAL at 04:55

## 2024-04-07 RX ADMIN — Medication 1 CAPSULE(S): at 12:45

## 2024-04-07 RX ADMIN — Medication 1 TABLET(S): at 11:03

## 2024-04-07 RX ADMIN — Medication 650 MILLIGRAM(S): at 18:52

## 2024-04-07 RX ADMIN — Medication 650 MILLIGRAM(S): at 17:53

## 2024-04-07 RX ADMIN — OXYCODONE HYDROCHLORIDE 5 MILLIGRAM(S): 5 TABLET ORAL at 11:49

## 2024-04-07 RX ADMIN — SODIUM CHLORIDE 500 MILLILITER(S): 9 INJECTION INTRAMUSCULAR; INTRAVENOUS; SUBCUTANEOUS at 12:39

## 2024-04-07 RX ADMIN — Medication 1 GRAM(S): at 21:23

## 2024-04-07 RX ADMIN — Medication 1 APPLICATION(S): at 11:03

## 2024-04-07 RX ADMIN — OXYCODONE HYDROCHLORIDE 2.5 MILLIGRAM(S): 5 TABLET ORAL at 04:51

## 2024-04-07 RX ADMIN — MEMANTINE HYDROCHLORIDE 10 MILLIGRAM(S): 10 TABLET ORAL at 17:55

## 2024-04-07 RX ADMIN — Medication 1 CAPSULE(S): at 17:54

## 2024-04-07 RX ADMIN — PANTOPRAZOLE SODIUM 40 MILLIGRAM(S): 20 TABLET, DELAYED RELEASE ORAL at 04:55

## 2024-04-07 RX ADMIN — INSULIN GLARGINE 4 UNIT(S): 100 INJECTION, SOLUTION SUBCUTANEOUS at 21:30

## 2024-04-07 RX ADMIN — Medication 1000 UNIT(S): at 11:03

## 2024-04-07 RX ADMIN — SERTRALINE 25 MILLIGRAM(S): 25 TABLET, FILM COATED ORAL at 21:23

## 2024-04-07 RX ADMIN — MEMANTINE HYDROCHLORIDE 10 MILLIGRAM(S): 10 TABLET ORAL at 04:55

## 2024-04-07 RX ADMIN — QUETIAPINE FUMARATE 25 MILLIGRAM(S): 200 TABLET, FILM COATED ORAL at 17:54

## 2024-04-07 RX ADMIN — Medication 1 CAPSULE(S): at 08:45

## 2024-04-07 RX ADMIN — OXYCODONE HYDROCHLORIDE 2.5 MILLIGRAM(S): 5 TABLET ORAL at 06:00

## 2024-04-07 RX ADMIN — Medication 400 MILLIGRAM(S): at 21:22

## 2024-04-07 RX ADMIN — OXYCODONE HYDROCHLORIDE 5 MILLIGRAM(S): 5 TABLET ORAL at 12:30

## 2024-04-07 RX ADMIN — Medication 650 MILLIGRAM(S): at 06:56

## 2024-04-07 RX ADMIN — OXYCODONE HYDROCHLORIDE 2.5 MILLIGRAM(S): 5 TABLET ORAL at 17:53

## 2024-04-07 RX ADMIN — PREGABALIN 1000 MICROGRAM(S): 225 CAPSULE ORAL at 11:04

## 2024-04-07 RX ADMIN — Medication 650 MILLIGRAM(S): at 06:05

## 2024-04-07 NOTE — PROGRESS NOTE ADULT - SUBJECTIVE AND OBJECTIVE BOX
Patient is a 83y old  Female who presents with a chief complaint of Intramuscular hematoma (2024 16:17)        SUBJECTIVE / OVERNIGHT EVENTS: Patient had no acute events overnight. Patient seen and examined at bedside this morning.     ROS: [ - ] Fever [ - ] Chills [ - ] Nausea/Vomiting [ - ] Chest Pain [ - ] Shortness of breath     MEDICATIONS  (STANDING):  ascorbic acid 500 milliGRAM(s) Oral <User Schedule>  cadexomer iodine 0.9% Gel 1 Application(s) Topical daily  calcium carbonate   1250 mG (OsCal) 1 Tablet(s) Oral daily  cholecalciferol 1000 Unit(s) Oral daily  cyanocobalamin 1000 MICROGram(s) Oral daily  dextrose 10% Bolus 125 milliLiter(s) IV Bolus once  dextrose 5%. 1000 milliLiter(s) (50 mL/Hr) IV Continuous <Continuous>  dextrose 5%. 1000 milliLiter(s) (100 mL/Hr) IV Continuous <Continuous>  dextrose 50% Injectable 12.5 Gram(s) IV Push once  dextrose 50% Injectable 25 Gram(s) IV Push once  famotidine    Tablet 20 milliGRAM(s) Oral daily  ferrous    sulfate 325 milliGRAM(s) Oral daily  glucagon  Injectable 1 milliGRAM(s) IntraMuscular once  insulin glargine Injectable (LANTUS) 4 Unit(s) SubCutaneous at bedtime  insulin lispro (ADMELOG) corrective regimen sliding scale   SubCutaneous three times a day before meals  memantine 10 milliGRAM(s) Oral two times a day  methenamine hippurate 1 Gram(s) Oral <User Schedule>  pancrelipase  (CREON 24,000 Lipase Units) 1 Capsule(s) Oral three times a day with meals  pantoprazole    Tablet 40 milliGRAM(s) Oral before breakfast  QUEtiapine 25 milliGRAM(s) Oral two times a day  sertraline 25 milliGRAM(s) Oral at bedtime    MEDICATIONS  (PRN):  acetaminophen     Tablet .. 650 milliGRAM(s) Oral every 6 hours PRN Temp greater or equal to 38C (100.4F), Mild Pain (1 - 3)  albuterol/ipratropium for Nebulization 3 milliLiter(s) Nebulizer every 6 hours PRN Shortness of Breath and/or Wheezing  dextrose Oral Gel 15 Gram(s) Oral once PRN Blood Glucose LESS THAN 70 milliGRAM(s)/deciliter  melatonin 3 milliGRAM(s) Oral at bedtime PRN Insomnia  oxyCODONE    IR 5 milliGRAM(s) Oral every 4 hours PRN Severe Pain (7 - 10)  oxyCODONE    IR 2.5 milliGRAM(s) Oral every 4 hours PRN Moderate Pain (4 - 6)      Vital Signs Last 24 Hrs  T(C): 36.4 (2024 10:56), Max: 36.7 (2024 05:05)  T(F): 97.6 (2024 10:56), Max: 98 (2024 05:05)  HR: 113 (2024 10:56) (94 - 114)  BP: 104/72 (2024 10:56) (100/60 - 155/84)  BP(mean): --  RR: 18 (2024 10:56) (18 - 18)  SpO2: 98% (2024 10:56) (95% - 98%)    Parameters below as of 2024 10:56  Patient On (Oxygen Delivery Method): room air      CAPILLARY BLOOD GLUCOSE      POCT Blood Glucose.: 94 mg/dL (2024 12:33)  POCT Blood Glucose.: 94 mg/dL (2024 08:41)  POCT Blood Glucose.: 157 mg/dL (2024 21:10)  POCT Blood Glucose.: 178 mg/dL (2024 17:21)    I&O's Summary    2024 07:  -  2024 07:00  --------------------------------------------------------  IN: 490 mL / OUT: 0 mL / NET: 490 mL    2024 07:01  -  2024 16:30  --------------------------------------------------------  IN: 240 mL / OUT: 600 mL / NET: -360 mL        PHYSICAL EXAM  GENERAL: NAD, lying comfortably in bed, frail  HEENT:  Atraumatic, Normocephalic, conjunctiva and sclera clear, no LAD  CHEST/LUNG: Clear to auscultation bilaterally; No wheeze  HEART: RRR, S1 and S2   ABDOMEN: Soft, Nontender, Nondistended; Bowel sounds present  EXTREMITIES: no le edema  NEURO: AAOx1, non-focal  SKIN: right inner thigh bruising and right labia brusing. right lower abdomen bruise      LABS:                        10.7   14.54 )-----------( 531      ( 2024 07:04 )             32.1                 Urinalysis Basic - ( 2024 12:46 )    Color: Yellow / Appearance: Clear / S.010 / pH: x  Gluc: x / Ketone: Negative mg/dL  / Bili: Negative / Urobili: 0.2 mg/dL   Blood: x / Protein: Negative mg/dL / Nitrite: Negative   Leuk Esterase: Trace / RBC: 2 /HPF / WBC 7 /HPF   Sq Epi: x / Non Sq Epi: 13 /HPF / Bacteria: Negative /HPF          RADIOLOGY & ADDITIONAL TESTS:      Labs Personally Reviewed  Imaging Personally Reviewed  Consultant(s) Notes Reviewed

## 2024-04-08 LAB
-  CTX-M RESISTANCE MARKER: SIGNIFICANT CHANGE UP
-  ESBL: SIGNIFICANT CHANGE UP
ADD ON TEST-SPECIMEN IN LAB: SIGNIFICANT CHANGE UP
ANION GAP SERPL CALC-SCNC: 11 MMOL/L — SIGNIFICANT CHANGE UP (ref 5–17)
BASOPHILS # BLD AUTO: 0.04 K/UL — SIGNIFICANT CHANGE UP (ref 0–0.2)
BASOPHILS NFR BLD AUTO: 0.3 % — SIGNIFICANT CHANGE UP (ref 0–2)
BUN SERPL-MCNC: 10 MG/DL — SIGNIFICANT CHANGE UP (ref 7–23)
CALCIUM SERPL-MCNC: 7.5 MG/DL — LOW (ref 8.4–10.5)
CHLORIDE SERPL-SCNC: 100 MMOL/L — SIGNIFICANT CHANGE UP (ref 96–108)
CO2 SERPL-SCNC: 23 MMOL/L — SIGNIFICANT CHANGE UP (ref 22–31)
CREAT SERPL-MCNC: <0.3 MG/DL — LOW (ref 0.5–1.3)
E COLI DNA BLD POS QL NAA+NON-PROBE: SIGNIFICANT CHANGE UP
EGFR: 104 ML/MIN/1.73M2 — SIGNIFICANT CHANGE UP
EOSINOPHIL # BLD AUTO: 0.31 K/UL — SIGNIFICANT CHANGE UP (ref 0–0.5)
EOSINOPHIL NFR BLD AUTO: 2.4 % — SIGNIFICANT CHANGE UP (ref 0–6)
GLUCOSE BLDC GLUCOMTR-MCNC: 129 MG/DL — HIGH (ref 70–99)
GLUCOSE BLDC GLUCOMTR-MCNC: 201 MG/DL — HIGH (ref 70–99)
GLUCOSE BLDC GLUCOMTR-MCNC: 74 MG/DL — SIGNIFICANT CHANGE UP (ref 70–99)
GLUCOSE BLDC GLUCOMTR-MCNC: 86 MG/DL — SIGNIFICANT CHANGE UP (ref 70–99)
GLUCOSE SERPL-MCNC: 46 MG/DL — CRITICAL LOW (ref 70–99)
GRAM STN FLD: ABNORMAL
HCT VFR BLD CALC: 32.7 % — LOW (ref 34.5–45)
HGB BLD-MCNC: 10.7 G/DL — LOW (ref 11.5–15.5)
IMM GRANULOCYTES NFR BLD AUTO: 0.4 % — SIGNIFICANT CHANGE UP (ref 0–0.9)
LYMPHOCYTES # BLD AUTO: 2.62 K/UL — SIGNIFICANT CHANGE UP (ref 1–3.3)
LYMPHOCYTES # BLD AUTO: 20.5 % — SIGNIFICANT CHANGE UP (ref 13–44)
MAGNESIUM SERPL-MCNC: 2 MG/DL — SIGNIFICANT CHANGE UP (ref 1.6–2.6)
MCHC RBC-ENTMCNC: 29.1 PG — SIGNIFICANT CHANGE UP (ref 27–34)
MCHC RBC-ENTMCNC: 32.7 GM/DL — SIGNIFICANT CHANGE UP (ref 32–36)
MCV RBC AUTO: 88.9 FL — SIGNIFICANT CHANGE UP (ref 80–100)
METHOD TYPE: SIGNIFICANT CHANGE UP
MONOCYTES # BLD AUTO: 1.06 K/UL — HIGH (ref 0–0.9)
MONOCYTES NFR BLD AUTO: 8.3 % — SIGNIFICANT CHANGE UP (ref 2–14)
NEUTROPHILS # BLD AUTO: 8.7 K/UL — HIGH (ref 1.8–7.4)
NEUTROPHILS NFR BLD AUTO: 68.1 % — SIGNIFICANT CHANGE UP (ref 43–77)
NRBC # BLD: 0 /100 WBCS — SIGNIFICANT CHANGE UP (ref 0–0)
PLATELET # BLD AUTO: 518 K/UL — HIGH (ref 150–400)
POTASSIUM SERPL-MCNC: 3.3 MMOL/L — LOW (ref 3.5–5.3)
POTASSIUM SERPL-SCNC: 3.3 MMOL/L — LOW (ref 3.5–5.3)
RBC # BLD: 3.68 M/UL — LOW (ref 3.8–5.2)
RBC # FLD: 16.2 % — HIGH (ref 10.3–14.5)
SODIUM SERPL-SCNC: 134 MMOL/L — LOW (ref 135–145)
SPECIMEN SOURCE: SIGNIFICANT CHANGE UP
WBC # BLD: 12.99 K/UL — HIGH (ref 3.8–10.5)
WBC # FLD AUTO: 12.99 K/UL — HIGH (ref 3.8–10.5)

## 2024-04-08 PROCEDURE — 71275 CT ANGIOGRAPHY CHEST: CPT | Mod: 26

## 2024-04-08 PROCEDURE — 99222 1ST HOSP IP/OBS MODERATE 55: CPT

## 2024-04-08 PROCEDURE — 99233 SBSQ HOSP IP/OBS HIGH 50: CPT

## 2024-04-08 RX ORDER — PIPERACILLIN AND TAZOBACTAM 4; .5 G/20ML; G/20ML
3.38 INJECTION, POWDER, LYOPHILIZED, FOR SOLUTION INTRAVENOUS EVERY 8 HOURS
Refills: 0 | Status: DISCONTINUED | OUTPATIENT
Start: 2024-04-08 | End: 2024-04-08

## 2024-04-08 RX ORDER — ENOXAPARIN SODIUM 100 MG/ML
40 INJECTION SUBCUTANEOUS EVERY 24 HOURS
Refills: 0 | Status: DISCONTINUED | OUTPATIENT
Start: 2024-04-08 | End: 2024-04-10

## 2024-04-08 RX ORDER — PIPERACILLIN AND TAZOBACTAM 4; .5 G/20ML; G/20ML
3.38 INJECTION, POWDER, LYOPHILIZED, FOR SOLUTION INTRAVENOUS ONCE
Refills: 0 | Status: DISCONTINUED | OUTPATIENT
Start: 2024-04-08 | End: 2024-04-08

## 2024-04-08 RX ORDER — POTASSIUM CHLORIDE 20 MEQ
40 PACKET (EA) ORAL ONCE
Refills: 0 | Status: COMPLETED | OUTPATIENT
Start: 2024-04-08 | End: 2024-04-08

## 2024-04-08 RX ORDER — PIPERACILLIN AND TAZOBACTAM 4; .5 G/20ML; G/20ML
3.38 INJECTION, POWDER, LYOPHILIZED, FOR SOLUTION INTRAVENOUS ONCE
Refills: 0 | Status: COMPLETED | OUTPATIENT
Start: 2024-04-08 | End: 2024-04-08

## 2024-04-08 RX ORDER — ERTAPENEM SODIUM 1 G/1
1000 INJECTION, POWDER, LYOPHILIZED, FOR SOLUTION INTRAMUSCULAR; INTRAVENOUS EVERY 24 HOURS
Refills: 0 | Status: DISCONTINUED | OUTPATIENT
Start: 2024-04-08 | End: 2024-04-14

## 2024-04-08 RX ADMIN — PIPERACILLIN AND TAZOBACTAM 200 GRAM(S): 4; .5 INJECTION, POWDER, LYOPHILIZED, FOR SOLUTION INTRAVENOUS at 06:59

## 2024-04-08 RX ADMIN — MEMANTINE HYDROCHLORIDE 10 MILLIGRAM(S): 10 TABLET ORAL at 05:12

## 2024-04-08 RX ADMIN — QUETIAPINE FUMARATE 25 MILLIGRAM(S): 200 TABLET, FILM COATED ORAL at 05:12

## 2024-04-08 RX ADMIN — Medication 1 CAPSULE(S): at 17:49

## 2024-04-08 RX ADMIN — SERTRALINE 25 MILLIGRAM(S): 25 TABLET, FILM COATED ORAL at 21:35

## 2024-04-08 RX ADMIN — FAMOTIDINE 20 MILLIGRAM(S): 10 INJECTION INTRAVENOUS at 11:50

## 2024-04-08 RX ADMIN — Medication 325 MILLIGRAM(S): at 11:50

## 2024-04-08 RX ADMIN — ERTAPENEM SODIUM 120 MILLIGRAM(S): 1 INJECTION, POWDER, LYOPHILIZED, FOR SOLUTION INTRAMUSCULAR; INTRAVENOUS at 11:48

## 2024-04-08 RX ADMIN — Medication 1000 UNIT(S): at 11:50

## 2024-04-08 RX ADMIN — MEMANTINE HYDROCHLORIDE 10 MILLIGRAM(S): 10 TABLET ORAL at 17:50

## 2024-04-08 RX ADMIN — PANTOPRAZOLE SODIUM 40 MILLIGRAM(S): 20 TABLET, DELAYED RELEASE ORAL at 05:12

## 2024-04-08 RX ADMIN — QUETIAPINE FUMARATE 25 MILLIGRAM(S): 200 TABLET, FILM COATED ORAL at 17:47

## 2024-04-08 RX ADMIN — ENOXAPARIN SODIUM 40 MILLIGRAM(S): 100 INJECTION SUBCUTANEOUS at 17:50

## 2024-04-08 RX ADMIN — Medication 1 CAPSULE(S): at 13:43

## 2024-04-08 RX ADMIN — Medication 1 CAPSULE(S): at 09:00

## 2024-04-08 RX ADMIN — Medication 1 TABLET(S): at 11:49

## 2024-04-08 RX ADMIN — INSULIN GLARGINE 4 UNIT(S): 100 INJECTION, SOLUTION SUBCUTANEOUS at 22:11

## 2024-04-08 RX ADMIN — Medication 650 MILLIGRAM(S): at 05:59

## 2024-04-08 RX ADMIN — Medication 650 MILLIGRAM(S): at 05:29

## 2024-04-08 RX ADMIN — Medication 40 MILLIEQUIVALENT(S): at 11:49

## 2024-04-08 RX ADMIN — PREGABALIN 1000 MICROGRAM(S): 225 CAPSULE ORAL at 11:51

## 2024-04-08 RX ADMIN — Medication 1 APPLICATION(S): at 11:49

## 2024-04-08 NOTE — CHART NOTE - NSCHARTNOTEFT_GEN_A_CORE
MAXWELL ARROYO    Notified by RN patient with critical lab result: blood Cx positive for GNR on 4/7/24 blood specimen.        Interventions taken     Start IV ATB: Zosyn   ID consult  f/u am WBC  cont assess and monitor  f/u with am team.                    La Izaguirre ANP-[facility] #[id number]

## 2024-04-08 NOTE — CONSULT NOTE ADULT - SUBJECTIVE AND OBJECTIVE BOX
Patient is a 83y old  Female who presents with a chief complaint of Intramuscular hematoma (08 Apr 2024 13:23)      HPI:  History obtained by patient's daughter at bedside, Shirley. Patient is hard of hearing but does answer some questions, moaning intermittently. This is an 82 y/o female w/ PMHx CAD s/p PCI on [facility], IDDM2 2/2  Whipple procedure for pancreatic CA, dementia (AAOx1-2, bedbound) presenting for an expanding wound over her R labia. She developed bruising on the labia on Monday around 3 days ago, but now there has been significant expansion of the bruising today with spread to the right thigh and abdominal area. She had an ultrasound done on Monday which showed a solid mixed cystic mass 4x8cm in the right thigh most likely representing a hematoma. The patient's PCP upon receiving the result, called the daughter and told her to bring her to the hospital. Had a CBC done on 4/2 which showed an Hb of 11.8. The patient lives at home with an aide, has 2 daughters who visits her. Per daughter at bedside, there has been no history of falls at home, however she is known to bruise very easily. Daughter states that patient has a bruise on her right arm that she saw happen when the aide gripped her wrist, stated that it didn't look like a lot of force was used. She believes the hematoma may have happened possibly from the patient being transferred from her bed to her wheelchair as that's used to move the patient. Patient was taken off of ASA 2-3 years ago and has is not on any full-dose AC. Brought to the ED for further evaluation. At bedside the patient is moaning intermittently, but does know her name, responds to some questions if asked loudly, and when her hip was touched, stated that it hurts. Daughter states that the patient usually knows her name and if she is in her house, doesn't really know she is in a hospital, has very bad short-term memory.    In the ED, she was afebrile and hemodynamically stable, saturating well on room air. CBC w/ normocytic anemia of 10, coags and CMP wnl except for hyperglycemia and hypoalbuminemia of 2.8. UA wnl. CT abdomen/pelvis showing R peroneal/buttock edema w/ heterogeneity and asymmetric enlargement of the R hip adductor muscles extending to the level of the distal femoral diaphysis, differential being IM hematoma w/ overlying contusion vs cellulitis w/ muscular extension and phlegmonous change. Age-indeterminate mod-severe T11 and L1 compression deformities also seen (documented to be present in HIE note in 2019). Was given IV tylenol, Vanc/Zosyn/Clindamycin in the ED.   (04 Apr 2024 21:21)    Above noted.  Afebrile during admission. No leukocytosis on admission. New WBC elevation starting 4/6. BCx on 4/7 w/ CTX-M and ESBL E coli.       prior hospital charts reviewed [  ]  primary team notes reviewed [  ]  other consultant notes reviewed [  ]    PAST MEDICAL & SURGICAL HISTORY:  CAD (coronary artery disease)      Dementia      Pancreatic cancer      Diabetes mellitus      GERD (gastroesophageal reflux disease)      H/O Whipple procedure          Allergies  No Known Allergies        ANTIMICROBIALS:  ertapenem  IVPB 1000 every 24 hours      OTHER MEDS: MEDICATIONS  (STANDING):  acetaminophen     Tablet .. 650 every 6 hours PRN  albuterol/ipratropium for Nebulization 3 every 6 hours PRN  dextrose 50% Injectable 12.5 once  dextrose 50% Injectable 25 once  dextrose Oral Gel 15 once PRN  enoxaparin Injectable 40 every 24 hours  famotidine    Tablet 20 daily  glucagon  Injectable 1 once  insulin glargine Injectable (LANTUS) 4 at bedtime  insulin lispro (ADMELOG) corrective regimen sliding scale  three times a day before meals  melatonin 3 at bedtime PRN  memantine 10 two times a day  oxyCODONE    IR 5 every 4 hours PRN  oxyCODONE    IR 2.5 every 4 hours PRN  pancrelipase  (CREON 24,000 Lipase Units) 1 three times a day with meals  pantoprazole    Tablet 40 before breakfast  QUEtiapine 25 two times a day  sertraline 25 at bedtime      SOCIAL HISTORY:   hx smoking  non-smoker    FAMILY HISTORY:      REVIEW OF SYSTEMS  [  ] ROS unobtainable because:    [  ] All other systems negative except as noted below:	    Constitutional:  [ ] fever [ ] chills  [ ] weight loss  [ ] weakness  Skin:  [ ] rash [ ] phlebitis	  Eyes: [ ] icterus [ ] pain  [ ] discharge	  ENMT: [ ] sore throat  [ ] thrush [ ] ulcers [ ] exudates  Respiratory: [ ] dyspnea [ ] hemoptysis [ ] cough [ ] sputum	  Cardiovascular:  [ ] chest pain [ ] palpitations [ ] edema	  Gastrointestinal:  [ ] nausea [ ] vomiting [ ] diarrhea [ ] constipation [ ] pain	  Genitourinary:  [ ] dysuria [ ] frequency [ ] hematuria [ ] discharge [ ] flank pain  [ ] incontinence  Musculoskeletal:  [ ] myalgias [ ] arthralgias [ ] arthritis  [ ] back pain  Neurological:  [ ] headache [ ] seizures  [ ] confusion/altered mental status  Psychiatric:  [ ] anxiety [ ] depression	  Hematology/Lymphatics:  [ ] lymphadenopathy  Endocrine:  [ ] adrenal [ ] thyroid  Allergic/Immunologic:	 [ ] transplant [ ] seasonal    Vital Signs Last 24 Hrs  T(F): 97.6 (04-08-24 @ 11:32), Max: 100 (04-07-24 @ 17:40)    Vital Signs Last 24 Hrs  HR: 75 (04-08-24 @ 11:32) (75 - 104)  BP: 133/75 (04-08-24 @ 11:32) (100/67 - 133/75)  RR: 18 (04-08-24 @ 11:32)  SpO2: 97% (04-08-24 @ 11:32) (95% - 98%)  Wt(kg): --    PHYSICAL EXAM:  Constitutional: non-toxic, no distress  HEAD/EYES: anicteric, no conjunctival injection  ENT:  supple, no thrush  Cardiovascular:   normal S1, S2, no murmur, no edema  Respiratory:  clear BS bilaterally, no wheezes, no rales  GI:  soft, non-tender, normal bowel sounds  :  no licona, no CVA tenderness  Musculoskeletal:  no synovitis, normal ROM  Neurologic: awake and alert, normal strength, no focal findings  Skin:  no rash, no erythema, no phlebitis  Heme/Onc: no lymphadenopathy   Psychiatric:  awake, alert, appropriate mood                                10.7   12.99 )-----------( 518      ( 08 Apr 2024 07:25 )             32.7       04-08    134<L>  |  100  |  10  ----------------------------<  46<LL>  3.3<L>   |  23  |  <0.30<L>    Ca    7.5<L>      08 Apr 2024 07:28  Mg     2.0     04-08        Urinalysis Basic - ( 08 Apr 2024 07:28 )    Color: x / Appearance: x / SG: x / pH: x  Gluc: 46 mg/dL / Ketone: x  / Bili: x / Urobili: x   Blood: x / Protein: x / Nitrite: x   Leuk Esterase: x / RBC: x / WBC x   Sq Epi: x / Non Sq Epi: x / Bacteria: x        MICROBIOLOGY:        Culture - Blood (collected 04-07-24 @ 13:06)  Source: .Blood Blood-Peripheral  Gram Stain (04-08-24 @ 05:46):    Growth in aerobic and anaerobic bottles: Gram Negative Rods  Preliminary Report (04-08-24 @ 05:46):    Growth in aerobic and anaerobic bottles: Gram Negative Rods    Direct identification is available within approximately 3-5    hours either by Blood Panel Multiplexed PCR or Direct    MALDI-TOF. Details: [email or web address]  Organism: Blood Culture PCR (04-08-24 @ 06:50)  Organism: Blood Culture PCR (04-08-24 @ 06:50)      Method Type: PCR      -  Escherichia coli: Detec      -  ESBL: Detec      -  CTX-M Resistance Marker: Detec                    RADIOLOGY:  imaging below personally reviewed  < from: CT Angio Chest PE Protocol w/ IV Cont (04.08.24 @ 08:41) >    ACC: [id number] EXAM:  CT ANGIO CHEST PULM ART [facility]   ORDERED BY:   JALEN ANDERSON     PROCEDURE DATE:  04/08/2024          INTERPRETATION:  CLINICAL INFORMATION: Tachycardia and leukocytosis.   Evaluate for PE or infectious source.    COMPARISON: None.    CONTRAST/COMPLICATIONS:  IV Contrast: Omnipaque 350  50 cc administered   50 cc discarded  Oral Contrast: NONE  Complications: None reported at time of study completion    PROCEDURE:  CT Angiogram of the chest was obtained with intravenouscontrast. Three   dimensional maximum intensity projection (MIP) images were generated.    FINDINGS:    PULMONARY ANGIOGRAM: No pulmonary embolism in the main, left main, right   main, or lobar pulmonary arteries. Limited evaluation of some of the   segmental and subsegmental pulmonary arteries in the bilateral lower   lobes due to motion artifact.    LYMPH NODES: No lymphadenopathy.    HEART/VASCULATURE: Heart size is normal. No pericardial effusion. Mildly   dilated ascending aorta measuring 4.2 cm. Aortic and coronary artery   calcifciations.    AIRWAYS/LUNGS/PLEURA: Patent central airways. Left upper lobe linear   atelectasis. Trace bilateral pleural effusions with associated passive   atelectasis.    UPPER ABDOMEN: Cholecystectomy with trace pneumobilia.    BONES/SOFT TISSUES: Age-indeterminate moderate T11 and L1 compression   deformities. Healed bilateral rib fractures. Left anterior chest wall   subcutaneous fluid density 1.1 cm nodule, possibly sebaceous cyst.    IMPRESSION:    No pulmonary embolism or evidence for pneumonia.    Trace bilateral pleural effusions.        --- End of Report ---           CAROLINE HEBERT MD; Resident Radiologist  This document has been electronically signed.  WOODY RIOS M.D., ATTENDING RADIOLOGIST  This document has been electronically signed. Apr 8 2024 10:20AM    < end of copied text >  < from: CT Head No Cont (04.05.24 @ 17:30) >  IMPRESSION:    Motion limited study. No evidence of acute intracranial hemorrhage,   midline shift or CT evidence of acute territorial infarct.    If the patient's symptoms persist, consider short interval follow-up head   CT or brain MRI if there are no MRI contraindications.    < end of copied text >  < from: CT Abdomen and Pelvis w/ IV Cont (04.04.24 @ 18:12) >    ACC: [id number] EXAM:  CT ABDOMEN AND PELVIS IC   ORDERED BY:  KEKE OGDEN     PROCEDURE DATE:  04/04/2024          INTERPRETATION:  CLINICAL INFORMATION: Right leg wound with concern for   necrotizing fasciitis. Bruising to abd/butt/pelvis. non ambulatory    COMPARISON: None.    CONTRAST/COMPLICATIONS:  IV Contrast: Omnipaque 350  90 cc administered   10 cc discarded  Oral Contrast: NONE  Complications: None reported at time of study completion    PROCEDURE:  CT of the Abdomen and Pelvis was performed, extending to the knees.  Sagittal and coronal reformats were performed.    FINDINGS:  LOWER CHEST: Dependent areas of subsegmental atelectasis. Coronary artery   and aortic calcifications.    LIVER: Steatosis.  BILE DUCTS: Tiny volume of pneumobilia in the left hepatic lobe,   correlate with procedural/surgical history.  GALLBLADDER: Cholecystectomy.  SPLEEN: Within normal limits.  PANCREAS: Within normal limits.  ADRENALS: Within normal limits.  KIDNEYS/URETERS: Within normal limits.    BLADDER: Within normal limits.  REPRODUCTIVE ORGANS: Uterus and adnexa within normal limits.    BOWEL: No bowel obstruction. Appendix is not visualized. Distal   gastrectomy and gastrojejunostomy.  PERITONEUM: No ascites.  VESSELS: Atherosclerotic changes.  RETROPERITONEUM/LYMPH NODES: No lymphadenopathy.  ABDOMINAL WALL: The right hip abductor muscles are asymmetrically   enlarged and heterogeneous, extending down to the distal femoral   diaphysis. No internal walled off collection. There is asymmetric edema   in the right perineum and gluteal subcutaneous tissues. No subcutaneous   or intramuscular air to suggest necrotizing infection.  2.4 x 1.5 cm ovoid low-attenuation superficial lesion overlying the lower   lumbar spine, likely sebaceouscyst.    BONES: Degenerative changes. No evidence of osteomyelitis.   Age-indeterminate moderate to severe L1 and T11 compression deformities.    IMPRESSION:  Right peroneal/buttock edema with heterogeneity and asymmetric   enlargement of the right hip abductor muscles, extending to the level of   the distal femoral diaphysis. No associated emphysematous change suggest   necrotizing fasciitis. No walled off collection. Primary differential   includes intramuscular hematoma with overlying contusion and cellulitis   with underlying infection extending into the muscle with resultant   phlegmonous change.    Dr. Hutson discussed this finding with Dr. Ogden on 4/4/2024 6:38 PM .    Age-indeterminate moderate to severe L1 and T11 compression deformities.    Trace pneumobilia. Correlate with procedural history.    --- End of Report ---           TORY HUTSON MD; Resident Radiologist  This document has been electronically signed.  LAURA DURAND MD; Attending Radiologist  This document has been electronically signed. Apr 4 2024  7:18PM    < end of copied text >   Patient is a 83y old  Female who presents with a chief complaint of Intramuscular hematoma (08 Apr 2024 13:23)      HPI:  History obtained by patient's daughter at bedside, Shirley. Patient is hard of hearing but does answer some questions, moaning intermittently. This is an 84 y/o female w/ PMHx CAD s/p PCI on 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 She developed bruising on the labia on Monday around 3 days ago, but now there has been significant expansion of the bruising today with spread to the right thigh and abdominal area. She had an ultrasound done on Monday which showed a solid mixed cystic mass 4x8cm in the right thigh most likely representing a hematoma. The patient's PCP upon receiving the result, called the daughter and told her to bring her to the hospital. Had a CBC done on 4/2 which showed an Hb of 11.8. The patient lives at home with an aide, has 2 daughters who visits her. Per daughter at bedside, there has been no history of falls at home, however she is known to bruise very easily. Daughter states that patient has a bruise on her right arm that she saw happen when the aide gripped her wrist, stated that it didn't look like a lot of force was used. She believes the hematoma may have happened possibly from the patient being transferred from her bed to her wheelchair as that's used to move the patient. Patient was taken off of ASA 2-3 years ago and has is not on any full-dose AC. Brought to the ED for further evaluation. At bedside the patient is moaning intermittently, but does know her name, responds to some questions if asked loudly, and when her hip was touched, stated that it hurts. Daughter states that the patient usually knows her name and if she is in her house, doesn't really know she is in a hospital, has very bad short-term memory.    In the ED, she was afebrile and hemodynamically stable, saturating well on room air. CBC w/ normocytic anemia of 10, coags and CMP wnl except for hyperglycemia and hypoalbuminemia of 2.8. UA wnl. CT abdomen/pelvis showing R peroneal/buttock edema w/ heterogeneity and asymmetric enlargement of the R hip adductor muscles extending to the level of the distal femoral diaphysis, differential being IM hematoma w/ overlying contusion vs cellulitis w/ muscular extension and phlegmonous change. Age-indeterminate mod-severe T11 and L1 compression deformities also seen (documented to be present in HIE note in 2019). Was given IV tylenol, Vanc/Zosyn/Clindamycin in the ED.   (04 Apr 2024 21:21)    Above noted.  Afebrile during admission. No leukocytosis on admission. New WBC elevation starting 4/6. BCx on 4/7 w/ CTX-M and ESBL E coli.       prior hospital charts reviewed [  ]  primary team notes reviewed [X]  other consultant notes reviewed [X]    PAST MEDICAL & SURGICAL HISTORY:  CAD (coronary artery disease)      Dementia      Pancreatic cancer      Diabetes mellitus      GERD (gastroesophageal reflux disease)      H/O Whipple procedure          Allergies  No Known Allergies        ANTIMICROBIALS:  ertapenem  IVPB 1000 every 24 hours      OTHER MEDS: MEDICATIONS  (STANDING):  acetaminophen     Tablet .. 650 every 6 hours PRN  albuterol/ipratropium for Nebulization 3 every 6 hours PRN  dextrose 50% Injectable 12.5 once  dextrose 50% Injectable 25 once  dextrose Oral Gel 15 once PRN  enoxaparin Injectable 40 every 24 hours  famotidine    Tablet 20 daily  glucagon  Injectable 1 once  insulin glargine Injectable (LANTUS) 4 at bedtime  insulin lispro (ADMELOG) corrective regimen sliding scale  three times a day before meals  melatonin 3 at bedtime PRN  memantine 10 two times a day  oxyCODONE    IR 5 every 4 hours PRN  oxyCODONE    IR 2.5 every 4 hours PRN  pancrelipase  (CREON 24,000 Lipase Units) 1 three times a day with meals  pantoprazole    Tablet 40 before breakfast  QUEtiapine 25 two times a day  sertraline 25 at bedtime      SOCIAL HISTORY:     No known tobacco or illicit drug history    FAMILY HISTORY:  No known family history of ESBL E coli bacteremia    REVIEW OF SYSTEMS  [X] ROS unobtainable because:  dementia  [  ] All other systems negative except as noted below:	    Constitutional:  [ ] fever [ ] chills  [ ] weight loss  [ ] weakness  Skin:  [ ] rash [ ] phlebitis	  Eyes: [ ] icterus [ ] pain  [ ] discharge	  ENMT: [ ] sore throat  [ ] thrush [ ] ulcers [ ] exudates  Respiratory: [ ] dyspnea [ ] hemoptysis [ ] cough [ ] sputum	  Cardiovascular:  [ ] chest pain [ ] palpitations [ ] edema	  Gastrointestinal:  [ ] nausea [ ] vomiting [ ] diarrhea [ ] constipation [ ] pain	  Genitourinary:  [ ] dysuria [ ] frequency [ ] hematuria [ ] discharge [ ] flank pain  [ ] incontinence  Musculoskeletal:  [ ] myalgias [ ] arthralgias [ ] arthritis  [ ] back pain  Neurological:  [ ] headache [ ] seizures  [ ] confusion/altered mental status  Psychiatric:  [ ] anxiety [ ] depression	  Hematology/Lymphatics:  [ ] lymphadenopathy  Endocrine:  [ ] adrenal [ ] thyroid  Allergic/Immunologic:	 [ ] transplant [ ] seasonal    Vital Signs Last 24 Hrs  T(F): 97.6 (04-08-24 @ 11:32), Max: 100 (04-07-24 @ 17:40)    Vital Signs Last 24 Hrs  HR: 75 (04-08-24 @ 11:32) (75 - 104)  BP: 133/75 (04-08-24 @ 11:32) (100/67 - 133/75)  RR: 18 (04-08-24 @ 11:32)  SpO2: 97% (04-08-24 @ 11:32) (95% - 98%)  Wt(kg): --    PHYSICAL EXAM:  Constitutional: non-toxic, no distress  HEAD/EYES: anicteric  ENT:  supple  Cardiovascular:   normal S1, S2, no murmur, RRR  Respiratory:  clear BS bilaterally, no wheezes, no rales  GI:  soft, non-tender  :  No CVA tenderness  Musculoskeletal:  no BLE edema  Neurologic: awake and oriented x1; +[place]  Skin: no sacral ulcer; labial bruises, no warmth, nontender  Heme/Onc: no anterior cervical lymphadenopathy; +bruises  Psychiatric:  awake, alert, appropriate mood                                10.7   12.99 )-----------( 518      ( 08 Apr 2024 07:25 )             32.7       04-08    134<L>  |  100  |  10  ----------------------------<  46<LL>  3.3<L>   |  23  |  <0.30<L>    Ca    7.5<L>      08 Apr 2024 07:28  Mg     2.0     04-08        Urinalysis Basic - ( 08 Apr 2024 07:28 )    Color: x / Appearance: x / SG: x / pH: x  Gluc: 46 mg/dL / Ketone: x  / Bili: x / Urobili: x   Blood: x / Protein: x / Nitrite: x   Leuk Esterase: x / RBC: x / WBC x   Sq Epi: x / Non Sq Epi: x / Bacteria: x        MICROBIOLOGY:        Culture - Blood (collected 04-07-24 @ 13:06)  Source: .Blood Blood-Peripheral  Gram Stain (04-08-24 @ 05:46):    Growth in aerobic and anaerobic bottles: Gram Negative Rods  Preliminary Report (04-08-24 @ 05:46):    Growth in aerobic and anaerobic bottles: Gram Negative Rods    Direct identification is available within approximately 3-5    hours either by Blood Panel Multiplexed PCR or Direct    MALDI-TOF. Details: [email or web address]  Organism: Blood Culture PCR (04-08-24 @ 06:50)  Organism: Blood Culture PCR (04-08-24 @ 06:50)      Method Type: PCR      -  Escherichia coli: Detec      -  ESBL: Detec      -  CTX-M Resistance Marker: Detec                    RADIOLOGY:  imaging below personally reviewed  < from: CT Angio Chest PE Protocol w/ IV Cont (04.08.24 @ 08:41) >    ACC: [id number] EXAM:  CT ANGIO CHEST PULM ART [facility]   ORDERED BY:   JALEN ANDERSON     PROCEDURE DATE:  04/08/2024          INTERPRETATION:  CLINICAL INFORMATION: Tachycardia and leukocytosis.   Evaluate for PE or infectious source.    COMPARISON: None.    CONTRAST/COMPLICATIONS:  IV Contrast: Omnipaque 350  50 cc administered   50 cc discarded  Oral Contrast: NONE  Complications: None reported at time of study completion    PROCEDURE:  CT Angiogram of the chest was obtained with intravenouscontrast. Three   dimensional maximum intensity projection (MIP) images were generated.    FINDINGS:    PULMONARY ANGIOGRAM: No pulmonary embolism in the main, left main, right   main, or lobar pulmonary arteries. Limited evaluation of some of the   segmental and subsegmental pulmonary arteries in the bilateral lower   lobes due to motion artifact.    LYMPH NODES: No lymphadenopathy.    HEART/VASCULATURE: Heart size is normal. No pericardial effusion. Mildly   dilated ascending aorta measuring 4.2 cm. Aortic and coronary artery   calcifciations.    AIRWAYS/LUNGS/PLEURA: Patent central airways. Left upper lobe linear   atelectasis. Trace bilateral pleural effusions with associated passive   atelectasis.    UPPER ABDOMEN: Cholecystectomy with trace pneumobilia.    BONES/SOFT TISSUES: Age-indeterminate moderate T11 and L1 compression   deformities. Healed bilateral rib fractures. Left anterior chest wall   subcutaneous fluid density 1.1 cm nodule, possibly sebaceous cyst.    IMPRESSION:    No pulmonary embolism or evidence for pneumonia.    Trace bilateral pleural effusions.        --- End of Report ---           CAROLINE HEBERT MD; Resident Radiologist  This document has been electronically signed.  WOODY RIOS M.D., ATTENDING RADIOLOGIST  This document has been electronically signed. Apr 8 2024 10:20AM    < end of copied text >  < from: CT Head No Cont (04.05.24 @ 17:30) >  IMPRESSION:    Motion limited study. No evidence of acute intracranial hemorrhage,   midline shift or CT evidence of acute territorial infarct.    If the patient's symptoms persist, consider short interval follow-up head   CT or brain MRI if there are no MRI contraindications.    < end of copied text >  < from: CT Abdomen and Pelvis w/ IV Cont (04.04.24 @ 18:12) >    ACC: [id number] EXAM:  CT ABDOMEN AND PELVIS IC   ORDERED BY:  KEKE OGDEN     PROCEDURE DATE:  04/04/2024          INTERPRETATION:  CLINICAL INFORMATION: Right leg wound with concern for   necrotizing fasciitis. Bruising to abd/butt/pelvis. non ambulatory    COMPARISON: None.    CONTRAST/COMPLICATIONS:  IV Contrast: Omnipaque 350  90 cc administered   10 cc discarded  Oral Contrast: NONE  Complications: None reported at time of study completion    PROCEDURE:  CT of the Abdomen and Pelvis was performed, extending to the knees.  Sagittal and coronal reformats were performed.    FINDINGS:  LOWER CHEST: Dependent areas of subsegmental atelectasis. Coronary artery   and aortic calcifications.    LIVER: Steatosis.  BILE DUCTS: Tiny volume of pneumobilia in the left hepatic lobe,   correlate with procedural/surgical history.  GALLBLADDER: Cholecystectomy.  SPLEEN: Within normal limits.  PANCREAS: Within normal limits.  ADRENALS: Within normal limits.  KIDNEYS/URETERS: Within normal limits.    BLADDER: Within normal limits.  REPRODUCTIVE ORGANS: Uterus and adnexa within normal limits.    BOWEL: No bowel obstruction. Appendix is not visualized. Distal   gastrectomy and gastrojejunostomy.  PERITONEUM: No ascites.  VESSELS: Atherosclerotic changes.  RETROPERITONEUM/LYMPH NODES: No lymphadenopathy.  ABDOMINAL WALL: The right hip abductor muscles are asymmetrically   enlarged and heterogeneous, extending down to the distal femoral   diaphysis. No internal walled off collection. There is asymmetric edema   in the right perineum and gluteal subcutaneous tissues. No subcutaneous   or intramuscular air to suggest necrotizing infection.  2.4 x 1.5 cm ovoid low-attenuation superficial lesion overlying the lower   lumbar spine, likely sebaceouscyst.    BONES: Degenerative changes. No evidence of osteomyelitis.   Age-indeterminate moderate to severe L1 and T11 compression deformities.    IMPRESSION:  Right peroneal/buttock edema with heterogeneity and asymmetric   enlargement of the right hip abductor muscles, extending to the level of   the distal femoral diaphysis. No associated emphysematous change suggest   necrotizing fasciitis. No walled off collection. Primary differential   includes intramuscular hematoma with overlying contusion and cellulitis   with underlying infection extending into the muscle with resultant   phlegmonous change.    Dr. Hutson discussed this finding with Dr. Ogden on 4/4/2024 6:38 PM .    Age-indeterminate moderate to severe L1 and T11 compression deformities.    Trace pneumobilia. Correlate with procedural history.    --- End of Report ---           TORY HUTSON MD; Resident Radiologist  This document has been electronically signed.  LAURA DURAND MD; Attending Radiologist  This document has been electronically signed. Apr 4 2024  7:18PM    < end of copied text >

## 2024-04-08 NOTE — PATIENT PROFILE ADULT - FALL HARM RISK - FALLEN IN PAST
Glenn Klein needs to be seen for an appointment before further refills are given.  
Unable to determine

## 2024-04-08 NOTE — PROGRESS NOTE ADULT - SUBJECTIVE AND OBJECTIVE BOX
Patient is a 83y old  Female who presents with a chief complaint of Intramuscular hematoma (07 Apr 2024 16:26)        SUBJECTIVE / OVERNIGHT EVENTS: Overnight, blood cultures+ GNR, and started on zosyn. Patient seen and examined at bedside this morning. Hard of hearing, stating she feels fine.     ROS: unable to partipate  MEDICATIONS  (STANDING):  ascorbic acid 500 milliGRAM(s) Oral <User Schedule>  cadexomer iodine 0.9% Gel 1 Application(s) Topical daily  calcium carbonate   1250 mG (OsCal) 1 Tablet(s) Oral daily  cholecalciferol 1000 Unit(s) Oral daily  cyanocobalamin 1000 MICROGram(s) Oral daily  dextrose 10% Bolus 125 milliLiter(s) IV Bolus once  dextrose 5%. 1000 milliLiter(s) (50 mL/Hr) IV Continuous <Continuous>  dextrose 5%. 1000 milliLiter(s) (100 mL/Hr) IV Continuous <Continuous>  dextrose 50% Injectable 12.5 Gram(s) IV Push once  dextrose 50% Injectable 25 Gram(s) IV Push once  ertapenem  IVPB 1000 milliGRAM(s) IV Intermittent every 24 hours  famotidine    Tablet 20 milliGRAM(s) Oral daily  ferrous    sulfate 325 milliGRAM(s) Oral daily  glucagon  Injectable 1 milliGRAM(s) IntraMuscular once  insulin glargine Injectable (LANTUS) 4 Unit(s) SubCutaneous at bedtime  insulin lispro (ADMELOG) corrective regimen sliding scale   SubCutaneous three times a day before meals  memantine 10 milliGRAM(s) Oral two times a day  pancrelipase  (CREON 24,000 Lipase Units) 1 Capsule(s) Oral three times a day with meals  pantoprazole    Tablet 40 milliGRAM(s) Oral before breakfast  QUEtiapine 25 milliGRAM(s) Oral two times a day  sertraline 25 milliGRAM(s) Oral at bedtime  sodium chloride 0.9% Bolus 500 milliLiter(s) IV Bolus once    MEDICATIONS  (PRN):  acetaminophen     Tablet .. 650 milliGRAM(s) Oral every 6 hours PRN Temp greater or equal to 38C (100.4F), Mild Pain (1 - 3)  albuterol/ipratropium for Nebulization 3 milliLiter(s) Nebulizer every 6 hours PRN Shortness of Breath and/or Wheezing  dextrose Oral Gel 15 Gram(s) Oral once PRN Blood Glucose LESS THAN 70 milliGRAM(s)/deciliter  melatonin 3 milliGRAM(s) Oral at bedtime PRN Insomnia  oxyCODONE    IR 5 milliGRAM(s) Oral every 4 hours PRN Severe Pain (7 - 10)  oxyCODONE    IR 2.5 milliGRAM(s) Oral every 4 hours PRN Moderate Pain (4 - 6)      Vital Signs Last 24 Hrs  T(C): 36.4 (08 Apr 2024 11:32), Max: 37.8 (07 Apr 2024 17:40)  T(F): 97.6 (08 Apr 2024 11:32), Max: 100 (07 Apr 2024 17:40)  HR: 75 (08 Apr 2024 11:32) (75 - 104)  BP: 133/75 (08 Apr 2024 11:32) (100/67 - 133/75)  BP(mean): --  RR: 18 (08 Apr 2024 11:32) (18 - 18)  SpO2: 97% (08 Apr 2024 11:32) (95% - 98%)    Parameters below as of 08 Apr 2024 11:32  Patient On (Oxygen Delivery Method): room air      CAPILLARY BLOOD GLUCOSE      POCT Blood Glucose.: 86 mg/dL (08 Apr 2024 12:38)  POCT Blood Glucose.: 74 mg/dL (08 Apr 2024 08:44)  POCT Blood Glucose.: 220 mg/dL (07 Apr 2024 21:29)  POCT Blood Glucose.: 133 mg/dL (07 Apr 2024 17:33)    I&O's Summary    07 Apr 2024 07:01  -  08 Apr 2024 07:00  --------------------------------------------------------  IN: 240 mL / OUT: 1777 mL / NET: -1537 mL    08 Apr 2024 07:01  -  08 Apr 2024 13:26  --------------------------------------------------------  IN: 100 mL / OUT: 0 mL / NET: 100 mL        PHYSICAL EXAM  GENERAL: NAD, lying comfortably in bed   HEENT:  Atraumatic, Normocephalic, EOMI, conjunctiva and sclera clear, no LAD  CHEST/LUNG: Clear to auscultation bilaterally; No wheeze. no wob  HEART: RRR, S1 and S2 No murmurs, rubs, or gallops  ABDOMEN: Soft, Nontender, Nondistended; Bowel sounds present  EXTREMITIES:  legs bent, bruising on right inner thigh, right labial fold and right lower abdomen. No erythema or discharge  NEURO: AAOx1, non-focal  SKIN: No additional rashes or lesions    LABS:                        10.7   12.99 )-----------( 518      ( 08 Apr 2024 07:25 )             32.7     04-08    134<L>  |  100  |  10  ----------------------------<  46<LL>  3.3<L>   |  23  |  <0.30<L>    Ca    7.5<L>      08 Apr 2024 07:28  Mg     2.0     04-08            Urinalysis Basic - ( 08 Apr 2024 07:28 )    Color: x / Appearance: x / SG: x / pH: x  Gluc: 46 mg/dL / Ketone: x  / Bili: x / Urobili: x   Blood: x / Protein: x / Nitrite: x   Leuk Esterase: x / RBC: x / WBC x   Sq Epi: x / Non Sq Epi: x / Bacteria: x          RADIOLOGY & ADDITIONAL TESTS:      Labs Personally Reviewed  Imaging Personally Reviewed  Consultant(s) Notes Reviewed

## 2024-04-08 NOTE — CONSULT NOTE ADULT - ASSESSMENT
83-yo F w/ PMH of CAD s/p PCI on Brilinta, dementia (A&O x1-2), pancreatic CA s/p Whipple, and DM, admitted with labial bruise likely a/w trauma, found to have ESBL E coli bacteremia. CTA chest w/o PE or pneumonia. CTAP w/ R hip adductor muscle enlargement extending to the level of distal femoral diaphysis. No evidence of necrotizing infection. No fever. UA neg for infection.     #ESBL E coli bacteremia  #Leukocytosis  #Abnormal CTAP  #Dementia  - UA neg for infection. Labial wound does not appear infected. Nontender. +bruises. Likely hematoma. No sacral ulcer. No resp distress.  - BCx positive w/ ESBL E coli bacteremia. Unclear source.  - Can continue with ertapenem 1g IV Q24H for now. Send BCx x2 sets AM  - F/u WBC and VS  - Possible drainage?    Plan discussed with primary team ACP.  Thank you for this consult. Inpatient ID team will follow.    Jeremy Canela MD, PhD  Attending Physician  Division of Infectious Diseases  Department of Medicine    Please contact through [employer].  Office: [phone] (after 5 PM or weekend)

## 2024-04-08 NOTE — PHARMACOTHERAPY INTERVENTION NOTE - COMMENTS
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 CT showing R peroneal/buttock edema with heterogeneity and asymmetric enlargement of the right hip abductor muscles, extending to the level of the distal femoral diaphysis suspicious for IM hematoma.  Now w ESBL bacteremia broadened to ertapenem.    Communicated with Dr. Ambriz - since patient on appropriate systemic antibiotics, recommended d/c methenamine.    Thank you,  Saritha Bray, PharmD, BCIDP  Clinical Pharmacist, Infectious Diseases  Available via [facility]   Cell: [phone]

## 2024-04-08 NOTE — CHART NOTE - NSCHARTNOTEFT_GEN_A_CORE
Wound Care Team Note:    Request for wound care consult for foot/toe wound received and referred to Podiatry. Please refer to Podiatry for management. Will not follow.    Diann Ridley NP-C, CWOCN via TEAMS

## 2024-04-08 NOTE — PROVIDER CONTACT NOTE (CRITICAL VALUE NOTIFICATION) - SITUATION
Blood Culture from 04/07 growth in both aerobic and anerobic bottle Gram - rods. Identification will be available in 3-5 hours .

## 2024-04-08 NOTE — PATIENT PROFILE ADULT - FALL HARM RISK - HARM RISK INTERVENTIONS

## 2024-04-09 DIAGNOSIS — R33.9 RETENTION OF URINE, UNSPECIFIED: ICD-10-CM

## 2024-04-09 LAB
-  AMPICILLIN/SULBACTAM: SIGNIFICANT CHANGE UP
-  AMPICILLIN: SIGNIFICANT CHANGE UP
-  AZTREONAM: SIGNIFICANT CHANGE UP
-  CEFAZOLIN: SIGNIFICANT CHANGE UP
-  CEFEPIME: SIGNIFICANT CHANGE UP
-  CEFTRIAXONE: SIGNIFICANT CHANGE UP
-  CIPROFLOXACIN: SIGNIFICANT CHANGE UP
-  ERTAPENEM: SIGNIFICANT CHANGE UP
-  GENTAMICIN: SIGNIFICANT CHANGE UP
-  IMIPENEM: SIGNIFICANT CHANGE UP
-  LEVOFLOXACIN: SIGNIFICANT CHANGE UP
-  MEROPENEM: SIGNIFICANT CHANGE UP
-  PIPERACILLIN/TAZOBACTAM: SIGNIFICANT CHANGE UP
-  TOBRAMYCIN: SIGNIFICANT CHANGE UP
-  TRIMETHOPRIM/SULFAMETHOXAZOLE: SIGNIFICANT CHANGE UP
ALBUMIN SERPL ELPH-MCNC: 2.5 G/DL — LOW (ref 3.3–5)
ALP SERPL-CCNC: 89 U/L — SIGNIFICANT CHANGE UP (ref 40–120)
ALT FLD-CCNC: 20 U/L — SIGNIFICANT CHANGE UP (ref 10–45)
ANION GAP SERPL CALC-SCNC: 10 MMOL/L — SIGNIFICANT CHANGE UP (ref 5–17)
AST SERPL-CCNC: 24 U/L — SIGNIFICANT CHANGE UP (ref 10–40)
BASOPHILS # BLD AUTO: 0.04 K/UL — SIGNIFICANT CHANGE UP (ref 0–0.2)
BASOPHILS NFR BLD AUTO: 0.4 % — SIGNIFICANT CHANGE UP (ref 0–2)
BILIRUB SERPL-MCNC: 0.9 MG/DL — SIGNIFICANT CHANGE UP (ref 0.2–1.2)
BUN SERPL-MCNC: 11 MG/DL — SIGNIFICANT CHANGE UP (ref 7–23)
CALCIUM SERPL-MCNC: 7.7 MG/DL — LOW (ref 8.4–10.5)
CHLORIDE SERPL-SCNC: 99 MMOL/L — SIGNIFICANT CHANGE UP (ref 96–108)
CO2 SERPL-SCNC: 23 MMOL/L — SIGNIFICANT CHANGE UP (ref 22–31)
CREAT SERPL-MCNC: 0.34 MG/DL — LOW (ref 0.5–1.3)
CULTURE RESULTS: ABNORMAL
CULTURE RESULTS: SIGNIFICANT CHANGE UP
CULTURE RESULTS: SIGNIFICANT CHANGE UP
EGFR: 101 ML/MIN/1.73M2 — SIGNIFICANT CHANGE UP
EOSINOPHIL # BLD AUTO: 0.21 K/UL — SIGNIFICANT CHANGE UP (ref 0–0.5)
EOSINOPHIL NFR BLD AUTO: 2.3 % — SIGNIFICANT CHANGE UP (ref 0–6)
GLUCOSE BLDC GLUCOMTR-MCNC: 108 MG/DL — HIGH (ref 70–99)
GLUCOSE BLDC GLUCOMTR-MCNC: 140 MG/DL — HIGH (ref 70–99)
GLUCOSE BLDC GLUCOMTR-MCNC: 210 MG/DL — HIGH (ref 70–99)
GLUCOSE BLDC GLUCOMTR-MCNC: 235 MG/DL — HIGH (ref 70–99)
GLUCOSE SERPL-MCNC: 92 MG/DL — SIGNIFICANT CHANGE UP (ref 70–99)
HCT VFR BLD CALC: 31.2 % — LOW (ref 34.5–45)
HGB BLD-MCNC: 10.2 G/DL — LOW (ref 11.5–15.5)
IMM GRANULOCYTES NFR BLD AUTO: 0.3 % — SIGNIFICANT CHANGE UP (ref 0–0.9)
LYMPHOCYTES # BLD AUTO: 1.98 K/UL — SIGNIFICANT CHANGE UP (ref 1–3.3)
LYMPHOCYTES # BLD AUTO: 22 % — SIGNIFICANT CHANGE UP (ref 13–44)
MCHC RBC-ENTMCNC: 29.3 PG — SIGNIFICANT CHANGE UP (ref 27–34)
MCHC RBC-ENTMCNC: 32.7 GM/DL — SIGNIFICANT CHANGE UP (ref 32–36)
MCV RBC AUTO: 89.7 FL — SIGNIFICANT CHANGE UP (ref 80–100)
METHOD TYPE: SIGNIFICANT CHANGE UP
MONOCYTES # BLD AUTO: 0.71 K/UL — SIGNIFICANT CHANGE UP (ref 0–0.9)
MONOCYTES NFR BLD AUTO: 7.9 % — SIGNIFICANT CHANGE UP (ref 2–14)
NEUTROPHILS # BLD AUTO: 6.04 K/UL — SIGNIFICANT CHANGE UP (ref 1.8–7.4)
NEUTROPHILS NFR BLD AUTO: 67.1 % — SIGNIFICANT CHANGE UP (ref 43–77)
NRBC # BLD: 0 /100 WBCS — SIGNIFICANT CHANGE UP (ref 0–0)
ORGANISM # SPEC MICROSCOPIC CNT: ABNORMAL
PLATELET # BLD AUTO: 463 K/UL — HIGH (ref 150–400)
POTASSIUM SERPL-MCNC: 4.3 MMOL/L — SIGNIFICANT CHANGE UP (ref 3.5–5.3)
POTASSIUM SERPL-SCNC: 4.3 MMOL/L — SIGNIFICANT CHANGE UP (ref 3.5–5.3)
PROT SERPL-MCNC: 5.5 G/DL — LOW (ref 6–8.3)
RBC # BLD: 3.48 M/UL — LOW (ref 3.8–5.2)
RBC # FLD: 16.5 % — HIGH (ref 10.3–14.5)
SODIUM SERPL-SCNC: 132 MMOL/L — LOW (ref 135–145)
SPECIMEN SOURCE: SIGNIFICANT CHANGE UP
WBC # BLD: 9.01 K/UL — SIGNIFICANT CHANGE UP (ref 3.8–10.5)
WBC # FLD AUTO: 9.01 K/UL — SIGNIFICANT CHANGE UP (ref 3.8–10.5)

## 2024-04-09 PROCEDURE — 99232 SBSQ HOSP IP/OBS MODERATE 35: CPT

## 2024-04-09 PROCEDURE — 99233 SBSQ HOSP IP/OBS HIGH 50: CPT

## 2024-04-09 RX ORDER — TAMSULOSIN HYDROCHLORIDE 0.4 MG/1
0.4 CAPSULE ORAL AT BEDTIME
Refills: 0 | Status: DISCONTINUED | OUTPATIENT
Start: 2024-04-09 | End: 2024-04-09

## 2024-04-09 RX ORDER — DOXAZOSIN MESYLATE 4 MG
2 TABLET ORAL AT BEDTIME
Refills: 0 | Status: DISCONTINUED | OUTPATIENT
Start: 2024-04-09 | End: 2024-04-14

## 2024-04-09 RX ADMIN — PREGABALIN 1000 MICROGRAM(S): 225 CAPSULE ORAL at 13:51

## 2024-04-09 RX ADMIN — FAMOTIDINE 20 MILLIGRAM(S): 10 INJECTION INTRAVENOUS at 13:50

## 2024-04-09 RX ADMIN — INSULIN GLARGINE 4 UNIT(S): 100 INJECTION, SOLUTION SUBCUTANEOUS at 21:32

## 2024-04-09 RX ADMIN — MEMANTINE HYDROCHLORIDE 10 MILLIGRAM(S): 10 TABLET ORAL at 18:26

## 2024-04-09 RX ADMIN — Medication 1000 UNIT(S): at 13:51

## 2024-04-09 RX ADMIN — QUETIAPINE FUMARATE 25 MILLIGRAM(S): 200 TABLET, FILM COATED ORAL at 18:25

## 2024-04-09 RX ADMIN — ERTAPENEM SODIUM 120 MILLIGRAM(S): 1 INJECTION, POWDER, LYOPHILIZED, FOR SOLUTION INTRAMUSCULAR; INTRAVENOUS at 09:03

## 2024-04-09 RX ADMIN — Medication 1 CAPSULE(S): at 18:20

## 2024-04-09 RX ADMIN — OXYCODONE HYDROCHLORIDE 5 MILLIGRAM(S): 5 TABLET ORAL at 09:03

## 2024-04-09 RX ADMIN — OXYCODONE HYDROCHLORIDE 5 MILLIGRAM(S): 5 TABLET ORAL at 10:00

## 2024-04-09 RX ADMIN — Medication 500 MILLIGRAM(S): at 09:02

## 2024-04-09 RX ADMIN — SERTRALINE 25 MILLIGRAM(S): 25 TABLET, FILM COATED ORAL at 21:33

## 2024-04-09 RX ADMIN — Medication 1 CAPSULE(S): at 09:03

## 2024-04-09 RX ADMIN — Medication 2 MILLIGRAM(S): at 22:57

## 2024-04-09 RX ADMIN — Medication 2: at 18:15

## 2024-04-09 RX ADMIN — ENOXAPARIN SODIUM 40 MILLIGRAM(S): 100 INJECTION SUBCUTANEOUS at 18:25

## 2024-04-09 RX ADMIN — PANTOPRAZOLE SODIUM 40 MILLIGRAM(S): 20 TABLET, DELAYED RELEASE ORAL at 06:02

## 2024-04-09 RX ADMIN — Medication 1 APPLICATION(S): at 13:51

## 2024-04-09 RX ADMIN — QUETIAPINE FUMARATE 25 MILLIGRAM(S): 200 TABLET, FILM COATED ORAL at 06:02

## 2024-04-09 RX ADMIN — Medication 1 CAPSULE(S): at 13:50

## 2024-04-09 RX ADMIN — Medication 1 TABLET(S): at 13:51

## 2024-04-09 RX ADMIN — MEMANTINE HYDROCHLORIDE 10 MILLIGRAM(S): 10 TABLET ORAL at 06:02

## 2024-04-09 RX ADMIN — Medication 325 MILLIGRAM(S): at 13:50

## 2024-04-09 NOTE — PROGRESS NOTE ADULT - SUBJECTIVE AND OBJECTIVE BOX
Patient is a 83y old  Female who presents with a chief complaint of Intramuscular hematoma (09 Apr 2024 11:02)        SUBJECTIVE / OVERNIGHT EVENTS: Patient had no acute events overnight. Patient seen and examined at bedside this morning. Confused, unable to partipate in conversation.     ROS:     MEDICATIONS  (STANDING):  ascorbic acid 500 milliGRAM(s) Oral <User Schedule>  cadexomer iodine 0.9% Gel 1 Application(s) Topical daily  calcium carbonate   1250 mG (OsCal) 1 Tablet(s) Oral daily  cholecalciferol 1000 Unit(s) Oral daily  cyanocobalamin 1000 MICROGram(s) Oral daily  dextrose 10% Bolus 125 milliLiter(s) IV Bolus once  dextrose 5%. 1000 milliLiter(s) (50 mL/Hr) IV Continuous <Continuous>  dextrose 5%. 1000 milliLiter(s) (100 mL/Hr) IV Continuous <Continuous>  dextrose 50% Injectable 12.5 Gram(s) IV Push once  dextrose 50% Injectable 25 Gram(s) IV Push once  enoxaparin Injectable 40 milliGRAM(s) SubCutaneous every 24 hours  ertapenem  IVPB 1000 milliGRAM(s) IV Intermittent every 24 hours  famotidine    Tablet 20 milliGRAM(s) Oral daily  ferrous    sulfate 325 milliGRAM(s) Oral daily  glucagon  Injectable 1 milliGRAM(s) IntraMuscular once  insulin glargine Injectable (LANTUS) 4 Unit(s) SubCutaneous at bedtime  insulin lispro (ADMELOG) corrective regimen sliding scale   SubCutaneous three times a day before meals  memantine 10 milliGRAM(s) Oral two times a day  pancrelipase  (CREON 24,000 Lipase Units) 1 Capsule(s) Oral three times a day with meals  pantoprazole    Tablet 40 milliGRAM(s) Oral before breakfast  QUEtiapine 25 milliGRAM(s) Oral two times a day  sertraline 25 milliGRAM(s) Oral at bedtime  sodium chloride 0.9% Bolus 500 milliLiter(s) IV Bolus once    MEDICATIONS  (PRN):  acetaminophen     Tablet .. 650 milliGRAM(s) Oral every 6 hours PRN Temp greater or equal to 38C (100.4F), Mild Pain (1 - 3)  albuterol/ipratropium for Nebulization 3 milliLiter(s) Nebulizer every 6 hours PRN Shortness of Breath and/or Wheezing  dextrose Oral Gel 15 Gram(s) Oral once PRN Blood Glucose LESS THAN 70 milliGRAM(s)/deciliter  melatonin 3 milliGRAM(s) Oral at bedtime PRN Insomnia  oxyCODONE    IR 5 milliGRAM(s) Oral every 4 hours PRN Severe Pain (7 - 10)  oxyCODONE    IR 2.5 milliGRAM(s) Oral every 4 hours PRN Moderate Pain (4 - 6)      Vital Signs Last 24 Hrs  T(C): 36.7 (09 Apr 2024 12:21), Max: 36.9 (09 Apr 2024 04:41)  T(F): 98 (09 Apr 2024 12:21), Max: 98.4 (09 Apr 2024 04:41)  HR: 99 (09 Apr 2024 12:21) (86 - 99)  BP: 124/73 (09 Apr 2024 12:21) (110/63 - 165/84)  BP(mean): --  RR: 18 (09 Apr 2024 12:21) (17 - 18)  SpO2: 96% (09 Apr 2024 12:21) (96% - 98%)    Parameters below as of 09 Apr 2024 12:21  Patient On (Oxygen Delivery Method): room air      CAPILLARY BLOOD GLUCOSE      POCT Blood Glucose.: 140 mg/dL (09 Apr 2024 12:41)  POCT Blood Glucose.: 108 mg/dL (09 Apr 2024 08:41)  POCT Blood Glucose.: 201 mg/dL (08 Apr 2024 22:02)  POCT Blood Glucose.: 129 mg/dL (08 Apr 2024 17:25)    I&O's Summary    08 Apr 2024 07:01  -  09 Apr 2024 07:00  --------------------------------------------------------  IN: 580 mL / OUT: 1050 mL / NET: -470 mL    09 Apr 2024 07:01  -  09 Apr 2024 13:06  --------------------------------------------------------  IN: 240 mL / OUT: 0 mL / NET: 240 mL        PHYSICAL EXAM  GENERAL: NAD, lying comfortably in bed   HEENT:  Atraumatic, Normocephalic, EOMI, conjunctiva and sclera clear, no LAD  CHEST/LUNG: Clear to auscultation bilaterally; No wheeze. no wob  HEART: RRR, S1 and S2 No murmurs, rubs, or gallops  ABDOMEN: Soft, Nontender, Nondistended; Bowel sounds present  EXTREMITIES:  legs bent, bruising on right inner thigh, right labial fold and right lower abdomen. No erythema or discharge  NEURO: AAOx1, non-focal  SKIN: No additional rashes or lesions    LABS:                        10.2   9.01  )-----------( 463      ( 09 Apr 2024 07:22 )             31.2     04-09    132<L>  |  99  |  11  ----------------------------<  92  4.3   |  23  |  0.34<L>    Ca    7.7<L>      09 Apr 2024 07:22  Mg     2.0     04-08    TPro  5.5<L>  /  Alb  2.5<L>  /  TBili  0.9  /  DBili  x   /  AST  24  /  ALT  20  /  AlkPhos  89  04-09          Urinalysis Basic - ( 09 Apr 2024 07:22 )    Color: x / Appearance: x / SG: x / pH: x  Gluc: 92 mg/dL / Ketone: x  / Bili: x / Urobili: x   Blood: x / Protein: x / Nitrite: x   Leuk Esterase: x / RBC: x / WBC x   Sq Epi: x / Non Sq Epi: x / Bacteria: x          RADIOLOGY & ADDITIONAL TESTS:      Labs Personally Reviewed  Imaging Personally Reviewed  Consultant(s) Notes Reviewed

## 2024-04-09 NOTE — PROGRESS NOTE ADULT - SUBJECTIVE AND OBJECTIVE BOX
Follow Up:    ESBL bacteremia    Interval History/ROS:  VSS ON. No leukocytosis. Patient was seen and examined at bedside. Confused. ROS unable to assess.    Allergies  No Known Allergies        ANTIMICROBIALS:  ertapenem  IVPB 1000 every 24 hours      OTHER MEDS:  MEDICATIONS  (STANDING):  acetaminophen     Tablet .. 650 every 6 hours PRN  albuterol/ipratropium for Nebulization 3 every 6 hours PRN  dextrose 50% Injectable 12.5 once  dextrose 50% Injectable 25 once  dextrose Oral Gel 15 once PRN  enoxaparin Injectable 40 every 24 hours  famotidine    Tablet 20 daily  glucagon  Injectable 1 once  insulin glargine Injectable (LANTUS) 4 at bedtime  insulin lispro (ADMELOG) corrective regimen sliding scale  three times a day before meals  melatonin 3 at bedtime PRN  memantine 10 two times a day  oxyCODONE    IR 2.5 every 4 hours PRN  oxyCODONE    IR 5 every 4 hours PRN  pancrelipase  (CREON 24,000 Lipase Units) 1 three times a day with meals  pantoprazole    Tablet 40 before breakfast  QUEtiapine 25 two times a day  sertraline 25 at bedtime      Vital Signs Last 24 Hrs  T(C): 36.9 (09 Apr 2024 04:41), Max: 36.9 (09 Apr 2024 04:41)  T(F): 98.4 (09 Apr 2024 04:41), Max: 98.4 (09 Apr 2024 04:41)  HR: 86 (09 Apr 2024 04:41) (75 - 98)  BP: 165/84 (09 Apr 2024 04:41) (110/63 - 165/84)  BP(mean): --  RR: 18 (09 Apr 2024 04:41) (17 - 18)  SpO2: 98% (09 Apr 2024 04:41) (96% - 98%)    Parameters below as of 09 Apr 2024 04:41  Patient On (Oxygen Delivery Method): room air        PHYSICAL EXAM:  Constitutional: NAD at rest  Cardiovascular:   normal S1, S2, no murmur, RRR  Respiratory:  clear BS bilaterally, no wheezes, no rales  GI:  soft, non-tender  :  no suprapubic tenderness  Neurologic: awake and oriented x0-1  Skin:  Pubic area w/ ecchymosis                                10.2   9.01  )-----------( 463      ( 09 Apr 2024 07:22 )             31.2       04-09    132<L>  |  99  |  11  ----------------------------<  92  4.3   |  23  |  0.34<L>    Ca    7.7<L>      09 Apr 2024 07:22  Mg     2.0     04-08    TPro  5.5<L>  /  Alb  2.5<L>  /  TBili  0.9  /  DBili  x   /  AST  24  /  ALT  20  /  AlkPhos  89  04-09      Urinalysis Basic - ( 09 Apr 2024 07:22 )    Color: x / Appearance: x / SG: x / pH: x  Gluc: 92 mg/dL / Ketone: x  / Bili: x / Urobili: x   Blood: x / Protein: x / Nitrite: x   Leuk Esterase: x / RBC: x / WBC x   Sq Epi: x / Non Sq Epi: x / Bacteria: x      MICROBIOLOGY:        Culture - Blood (collected 04-07-24 @ 13:06)  Source: .Blood Blood-Peripheral  Gram Stain (04-08-24 @ 05:46):    Growth in aerobic and anaerobic bottles: Gram Negative Rods  Preliminary Report (04-08-24 @ 05:46):    Growth in aerobic and anaerobic bottles: Gram Negative Rods    Direct identification is available within approximately 3-5    hours either by Blood Panel Multiplexed PCR or Direct    MALDI-TOF. Details: [email or web address]  Organism: Blood Culture PCR (04-08-24 @ 06:50)  Organism: Blood Culture PCR (04-08-24 @ 06:50)      Method Type: PCR      -  Escherichia coli: Detec      -  ESBL: Detec      -  CTX-M Resistance Marker: Detec                    RADIOLOGY:  imaging below personally reviewed  < from: CT Angio Chest PE Protocol w/ IV Cont (04.08.24 @ 08:41) >    ACC: [id number] EXAM:  CT ANGIO CHEST PULM ART WAWI   ORDERED BY:   JALEN ANDERSON     PROCEDURE DATE:  04/08/2024          INTERPRETATION:  CLINICAL INFORMATION: Tachycardia and leukocytosis.   Evaluate for PE or infectious source.    COMPARISON: None.    CONTRAST/COMPLICATIONS:  IV Contrast: Omnipaque 350  50 cc administered   50 cc discarded  Oral Contrast: NONE  Complications: None reported at time of study completion    PROCEDURE:  CT Angiogram of the chest was obtained with intravenouscontrast. Three   dimensional maximum intensity projection (MIP) images were generated.    FINDINGS:    PULMONARY ANGIOGRAM: No pulmonary embolism in the main, left main, right   main, or lobar pulmonary arteries. Limited evaluation of some of the   segmental and subsegmental pulmonary arteries in the bilateral lower   lobes due to motion artifact.    LYMPH NODES: No lymphadenopathy.    HEART/VASCULATURE: Heart size is normal. No pericardial effusion. Mildly   dilated ascending aorta measuring 4.2 cm. Aortic and coronary artery   calcifciations.    AIRWAYS/LUNGS/PLEURA: Patent central airways. Left upper lobe linear   atelectasis. Trace bilateral pleural effusions with associated passive   atelectasis.    UPPER ABDOMEN: Cholecystectomy with trace pneumobilia.    BONES/SOFT TISSUES: Age-indeterminate moderate T11 and L1 compression   deformities. Healed bilateral rib fractures. Left anterior chest wall   subcutaneous fluid density 1.1 cm nodule, possibly sebaceous cyst.    IMPRESSION:    No pulmonary embolism or evidence for pneumonia.    Trace bilateral pleural effusions.        --- End of Report ---           CAROLINE HEBERT MD; Resident Radiologist  This document has been electronically signed.  WOODY RIOS M.D., ATTENDING RADIOLOGIST  This document has been electronically signed. Apr 8 2024 10:20AM    < end of copied text >  < from: CT Head No Cont (04.05.24 @ 17:30) >  IMPRESSION:    Motion limited study. No evidence of acute intracranial hemorrhage,   midline shift or CT evidence of acute territorial infarct.    If the patient's symptoms persist, consider short interval follow-up head   CT or brain MRI if there are no MRI contraindications.    < end of copied text >  < from: CT Abdomen and Pelvis w/ IV Cont (04.04.24 @ 18:12) >    ACC: [id number] EXAM:  CT ABDOMEN AND PELVIS IC   ORDERED BY:  KEKE OGDEN     PROCEDURE DATE:  04/04/2024          INTERPRETATION:  CLINICAL INFORMATION: Right leg wound with concern for   necrotizing fasciitis. Bruising to abd/butt/pelvis. non ambulatory    COMPARISON: None.    CONTRAST/COMPLICATIONS:  IV Contrast: Omnipaque 350  90 cc administered   10 cc discarded  Oral Contrast: NONE  Complications: None reported at time of study completion    PROCEDURE:  CT of the Abdomen and Pelvis was performed, extending to the knees.  Sagittal and coronal reformats were performed.    FINDINGS:  LOWER CHEST: Dependent areas of subsegmental atelectasis. Coronary artery   and aortic calcifications.    LIVER: Steatosis.  BILE DUCTS: Tiny volume of pneumobilia in the left hepatic lobe,   correlate with procedural/surgical history.  GALLBLADDER: Cholecystectomy.  SPLEEN: Within normal limits.  PANCREAS: Within normal limits.  ADRENALS: Within normal limits.  KIDNEYS/URETERS: Within normal limits.    BLADDER: Within normal limits.  REPRODUCTIVE ORGANS: Uterus and adnexa within normal limits.    BOWEL: No bowel obstruction. Appendix is not visualized. Distal   gastrectomy and gastrojejunostomy.  PERITONEUM: No ascites.  VESSELS: Atherosclerotic changes.  RETROPERITONEUM/LYMPH NODES: No lymphadenopathy.  ABDOMINAL WALL: The right hip abductor muscles are asymmetrically   enlarged and heterogeneous, extending down to the distal femoral   diaphysis. No internal walled off collection. There is asymmetric edema   in the right perineum and gluteal subcutaneous tissues. No subcutaneous   or intramuscular air to suggest necrotizing infection.  2.4 x 1.5 cm ovoid low-attenuation superficial lesion overlying the lower   lumbar spine, likely sebaceouscyst.    BONES: Degenerative changes. No evidence of osteomyelitis.   Age-indeterminate moderate to severe L1 and T11 compression deformities.    IMPRESSION:  Right peroneal/buttock edema with heterogeneity and asymmetric   enlargement of the right hip abductor muscles, extending to the level of   the distal femoral diaphysis. No associated emphysematous change suggest   necrotizing fasciitis. No walled off collection. Primary differential   includes intramuscular hematoma with overlying contusion and cellulitis   with underlying infection extending into the muscle with resultant   phlegmonous change.    Dr. Hutson discussed this finding with Dr. Ogden on 4/4/2024 6:38 PM .    Age-indeterminate moderate to severe L1 and T11 compression deformities.    Trace pneumobilia. Correlate with procedural history.    --- End of Report ---           TORY HUTSON MD; Resident Radiologist  This document has been electronically signed.  LAURA DURAND MD; Attending Radiologist  This document has been electronically signed. Apr 4 2024  7:18PM    < end of copied text >

## 2024-04-10 LAB
ANION GAP SERPL CALC-SCNC: 10 MMOL/L — SIGNIFICANT CHANGE UP (ref 5–17)
BLD GP AB SCN SERPL QL: NEGATIVE — SIGNIFICANT CHANGE UP
BUN SERPL-MCNC: 9 MG/DL — SIGNIFICANT CHANGE UP (ref 7–23)
CALCIUM SERPL-MCNC: 7.7 MG/DL — LOW (ref 8.4–10.5)
CHLORIDE SERPL-SCNC: 101 MMOL/L — SIGNIFICANT CHANGE UP (ref 96–108)
CO2 SERPL-SCNC: 22 MMOL/L — SIGNIFICANT CHANGE UP (ref 22–31)
CREAT SERPL-MCNC: <0.3 MG/DL — LOW (ref 0.5–1.3)
EGFR: 104 ML/MIN/1.73M2 — SIGNIFICANT CHANGE UP
GLUCOSE BLDC GLUCOMTR-MCNC: 167 MG/DL — HIGH (ref 70–99)
GLUCOSE BLDC GLUCOMTR-MCNC: 274 MG/DL — HIGH (ref 70–99)
GLUCOSE BLDC GLUCOMTR-MCNC: 274 MG/DL — HIGH (ref 70–99)
GLUCOSE BLDC GLUCOMTR-MCNC: 282 MG/DL — HIGH (ref 70–99)
GLUCOSE BLDC GLUCOMTR-MCNC: 90 MG/DL — SIGNIFICANT CHANGE UP (ref 70–99)
GLUCOSE SERPL-MCNC: 83 MG/DL — SIGNIFICANT CHANGE UP (ref 70–99)
HCT VFR BLD CALC: 26.6 % — LOW (ref 34.5–45)
HCT VFR BLD CALC: 29.9 % — LOW (ref 34.5–45)
HCT VFR BLD CALC: 31.6 % — LOW (ref 34.5–45)
HGB BLD-MCNC: 8.4 G/DL — LOW (ref 11.5–15.5)
HGB BLD-MCNC: 9 G/DL — LOW (ref 11.5–15.5)
HGB BLD-MCNC: 9.3 G/DL — LOW (ref 11.5–15.5)
MCHC RBC-ENTMCNC: 28.4 PG — SIGNIFICANT CHANGE UP (ref 27–34)
MCHC RBC-ENTMCNC: 28.5 GM/DL — LOW (ref 32–36)
MCHC RBC-ENTMCNC: 29 PG — SIGNIFICANT CHANGE UP (ref 27–34)
MCHC RBC-ENTMCNC: 29 PG — SIGNIFICANT CHANGE UP (ref 27–34)
MCHC RBC-ENTMCNC: 31.1 GM/DL — LOW (ref 32–36)
MCHC RBC-ENTMCNC: 31.6 GM/DL — LOW (ref 32–36)
MCV RBC AUTO: 101.9 FL — HIGH (ref 80–100)
MCV RBC AUTO: 91.4 FL — SIGNIFICANT CHANGE UP (ref 80–100)
MCV RBC AUTO: 91.7 FL — SIGNIFICANT CHANGE UP (ref 80–100)
MRSA PCR RESULT.: DETECTED
NRBC # BLD: 0 /100 WBCS — SIGNIFICANT CHANGE UP (ref 0–0)
PLATELET # BLD AUTO: 274 K/UL — SIGNIFICANT CHANGE UP (ref 150–400)
PLATELET # BLD AUTO: 330 K/UL — SIGNIFICANT CHANGE UP (ref 150–400)
PLATELET # BLD AUTO: 384 K/UL — SIGNIFICANT CHANGE UP (ref 150–400)
POTASSIUM SERPL-MCNC: 3.8 MMOL/L — SIGNIFICANT CHANGE UP (ref 3.5–5.3)
POTASSIUM SERPL-SCNC: 3.8 MMOL/L — SIGNIFICANT CHANGE UP (ref 3.5–5.3)
RBC # BLD: 2.9 M/UL — LOW (ref 3.8–5.2)
RBC # BLD: 3.1 M/UL — LOW (ref 3.8–5.2)
RBC # BLD: 3.27 M/UL — LOW (ref 3.8–5.2)
RBC # FLD: 16.4 % — HIGH (ref 10.3–14.5)
RBC # FLD: 16.5 % — HIGH (ref 10.3–14.5)
RBC # FLD: 16.6 % — HIGH (ref 10.3–14.5)
RH IG SCN BLD-IMP: POSITIVE — SIGNIFICANT CHANGE UP
S AUREUS DNA NOSE QL NAA+PROBE: DETECTED
SODIUM SERPL-SCNC: 133 MMOL/L — LOW (ref 135–145)
WBC # BLD: 6.69 K/UL — SIGNIFICANT CHANGE UP (ref 3.8–10.5)
WBC # BLD: 7.73 K/UL — SIGNIFICANT CHANGE UP (ref 3.8–10.5)
WBC # BLD: 8.22 K/UL — SIGNIFICANT CHANGE UP (ref 3.8–10.5)
WBC # FLD AUTO: 6.69 K/UL — SIGNIFICANT CHANGE UP (ref 3.8–10.5)
WBC # FLD AUTO: 7.73 K/UL — SIGNIFICANT CHANGE UP (ref 3.8–10.5)
WBC # FLD AUTO: 8.22 K/UL — SIGNIFICANT CHANGE UP (ref 3.8–10.5)

## 2024-04-10 PROCEDURE — 99233 SBSQ HOSP IP/OBS HIGH 50: CPT

## 2024-04-10 PROCEDURE — 74177 CT ABD & PELVIS W/CONTRAST: CPT | Mod: 26

## 2024-04-10 PROCEDURE — 99232 SBSQ HOSP IP/OBS MODERATE 35: CPT

## 2024-04-10 RX ORDER — MUPIROCIN 20 MG/G
1 OINTMENT TOPICAL
Refills: 0 | Status: DISCONTINUED | OUTPATIENT
Start: 2024-04-10 | End: 2024-04-14

## 2024-04-10 RX ORDER — PANTOPRAZOLE SODIUM 20 MG/1
40 TABLET, DELAYED RELEASE ORAL ONCE
Refills: 0 | Status: COMPLETED | OUTPATIENT
Start: 2024-04-10 | End: 2024-04-10

## 2024-04-10 RX ORDER — CHLORHEXIDINE GLUCONATE 213 G/1000ML
1 SOLUTION TOPICAL
Refills: 0 | Status: DISCONTINUED | OUTPATIENT
Start: 2024-04-10 | End: 2024-04-14

## 2024-04-10 RX ORDER — METOPROLOL TARTRATE 50 MG
12.5 TABLET ORAL
Refills: 0 | Status: DISCONTINUED | OUTPATIENT
Start: 2024-04-10 | End: 2024-04-14

## 2024-04-10 RX ADMIN — INSULIN GLARGINE 4 UNIT(S): 100 INJECTION, SOLUTION SUBCUTANEOUS at 22:38

## 2024-04-10 RX ADMIN — ERTAPENEM SODIUM 120 MILLIGRAM(S): 1 INJECTION, POWDER, LYOPHILIZED, FOR SOLUTION INTRAMUSCULAR; INTRAVENOUS at 12:38

## 2024-04-10 RX ADMIN — OXYCODONE HYDROCHLORIDE 5 MILLIGRAM(S): 5 TABLET ORAL at 12:38

## 2024-04-10 RX ADMIN — SERTRALINE 25 MILLIGRAM(S): 25 TABLET, FILM COATED ORAL at 22:38

## 2024-04-10 RX ADMIN — Medication 1 TABLET(S): at 12:39

## 2024-04-10 RX ADMIN — QUETIAPINE FUMARATE 25 MILLIGRAM(S): 200 TABLET, FILM COATED ORAL at 17:59

## 2024-04-10 RX ADMIN — Medication 325 MILLIGRAM(S): at 12:39

## 2024-04-10 RX ADMIN — Medication 1 CAPSULE(S): at 09:39

## 2024-04-10 RX ADMIN — MEMANTINE HYDROCHLORIDE 10 MILLIGRAM(S): 10 TABLET ORAL at 06:42

## 2024-04-10 RX ADMIN — PREGABALIN 1000 MICROGRAM(S): 225 CAPSULE ORAL at 12:39

## 2024-04-10 RX ADMIN — QUETIAPINE FUMARATE 25 MILLIGRAM(S): 200 TABLET, FILM COATED ORAL at 06:43

## 2024-04-10 RX ADMIN — Medication 1 CAPSULE(S): at 17:59

## 2024-04-10 RX ADMIN — Medication 12.5 MILLIGRAM(S): at 13:35

## 2024-04-10 RX ADMIN — MEMANTINE HYDROCHLORIDE 10 MILLIGRAM(S): 10 TABLET ORAL at 17:59

## 2024-04-10 RX ADMIN — OXYCODONE HYDROCHLORIDE 5 MILLIGRAM(S): 5 TABLET ORAL at 13:10

## 2024-04-10 RX ADMIN — Medication 3: at 18:58

## 2024-04-10 RX ADMIN — MUPIROCIN 1 APPLICATION(S): 20 OINTMENT TOPICAL at 18:52

## 2024-04-10 RX ADMIN — Medication 1: at 13:00

## 2024-04-10 RX ADMIN — Medication 1000 UNIT(S): at 12:40

## 2024-04-10 RX ADMIN — PANTOPRAZOLE SODIUM 40 MILLIGRAM(S): 20 TABLET, DELAYED RELEASE ORAL at 06:43

## 2024-04-10 RX ADMIN — Medication 1 APPLICATION(S): at 12:41

## 2024-04-10 RX ADMIN — FAMOTIDINE 20 MILLIGRAM(S): 10 INJECTION INTRAVENOUS at 12:40

## 2024-04-10 RX ADMIN — Medication 12.5 MILLIGRAM(S): at 19:04

## 2024-04-10 RX ADMIN — Medication 2 MILLIGRAM(S): at 22:38

## 2024-04-10 RX ADMIN — Medication 1 TABLET(S): at 19:07

## 2024-04-10 RX ADMIN — Medication 1 CAPSULE(S): at 12:41

## 2024-04-10 NOTE — DIETITIAN INITIAL EVALUATION ADULT - ORAL INTAKE PTA/DIET HISTORY
spoke with daughter over the phone. pt has aides at home. she eats bread, cheese, eggs, potatoes, noodle, fruit. she has dentures and needs soft foods. daughter prefers for pt to remain on regular texture diet.

## 2024-04-10 NOTE — CHART NOTE - NSCHARTNOTESELECT_GEN_ALL_CORE
Surgery Sign Off/Event Note
Anemia/GOC/Event Note
Event Note
Event Note
Wound Care Team Note:/Event Note

## 2024-04-10 NOTE — DIETITIAN INITIAL EVALUATION ADULT - PROBLEM SELECTOR PLAN 5
- AAO x 1-2 at baseline, currently AAOx1  - C/w Namenda 10mg BID, Seroquel 25mg BID, Sertraline 25mg daily
Statement Selected

## 2024-04-10 NOTE — DIETITIAN INITIAL EVALUATION ADULT - PROBLEM SELECTOR PLAN 6
- Had pancreatic CA in the past, received Whipple's surgery 16 years ago, has associated fat malabsorption  - Creon capsule before meals

## 2024-04-10 NOTE — PHYSICAL THERAPY INITIAL EVALUATION ADULT - IMPAIRMENTS FOUND, PT EVAL
arousal, attention, and cognition/cognitive impairment/fine motor/gross motor/joint integrity and mobility/muscle strength

## 2024-04-10 NOTE — PHYSICAL THERAPY INITIAL EVALUATION ADULT - DID THE PATIENT HAVE SURGERY?
[FreeTextEntry1] : 40 -year-old man with narcolepsy and mild obstructive sleep apnea.\par \par Patient is currently using oral appliance for mild sleep apnea, will switch to CPAP for mild obstructive sleep apnea as oral appliance is not working.\par \par Patient is on xyrem. There is slight improvement in tiredness but still is very sleepy. Will increase dose slowly to 4.5 g twice at night.\par \par I will discuss further with the cardiologist and see if we can add stimulants to his regimen, we may have to add some stimulants during the day as he is not able to use xyrem couple nights a week due to his overnight work schedule as a . n/a
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
## 2024-04-10 NOTE — PHYSICAL THERAPY INITIAL EVALUATION ADULT - MANUAL MUSCLE TESTING RESULTS, REHAB EVAL
Not able to participate in formal assessment 2/2 dementia; 3-/5 as observed; pt.'s UE elevated passively & pt. able to hold the position/not tested due to

## 2024-04-10 NOTE — DIETITIAN INITIAL EVALUATION ADULT - PERTINENT LABORATORY DATA
04-10    133<L>  |  101  |  9   ----------------------------<  83  3.8   |  22  |  <0.30<L>    Ca    7.7<L>      10 Apr 2024 07:08    TPro  5.5<L>  /  Alb  2.5<L>  /  TBili  0.9  /  DBili  x   /  AST  24  /  ALT  20  /  AlkPhos  89  04-09  POCT Blood Glucose.: 167 mg/dL (04-10-24 @ 12:33)  A1C with Estimated Average Glucose Result: 6.1 % (04-05-24 @ 07:18)

## 2024-04-10 NOTE — DIETITIAN INITIAL EVALUATION ADULT - NSICDXPASTMEDICALHX_GEN_ALL_CORE_FT
PAST MEDICAL HISTORY:  CAD (coronary artery disease)     Dementia     Diabetes mellitus     GERD (gastroesophageal reflux disease)     Pancreatic cancer

## 2024-04-10 NOTE — DIETITIAN INITIAL EVALUATION ADULT - PERTINENT MEDS FT
MEDICATIONS  (STANDING):  ascorbic acid 500 milliGRAM(s) Oral <User Schedule>  cadexomer iodine 0.9% Gel 1 Application(s) Topical daily  calcium carbonate   1250 mG (OsCal) 1 Tablet(s) Oral daily  cholecalciferol 1000 Unit(s) Oral daily  cyanocobalamin 1000 MICROGram(s) Oral daily  dextrose 10% Bolus 125 milliLiter(s) IV Bolus once  dextrose 5%. 1000 milliLiter(s) (100 mL/Hr) IV Continuous <Continuous>  dextrose 5%. 1000 milliLiter(s) (50 mL/Hr) IV Continuous <Continuous>  dextrose 50% Injectable 12.5 Gram(s) IV Push once  dextrose 50% Injectable 25 Gram(s) IV Push once  doxazosin 2 milliGRAM(s) Oral at bedtime  enoxaparin Injectable 40 milliGRAM(s) SubCutaneous every 24 hours  ertapenem  IVPB 1000 milliGRAM(s) IV Intermittent every 24 hours  famotidine    Tablet 20 milliGRAM(s) Oral daily  ferrous    sulfate 325 milliGRAM(s) Oral daily  glucagon  Injectable 1 milliGRAM(s) IntraMuscular once  insulin glargine Injectable (LANTUS) 4 Unit(s) SubCutaneous at bedtime  insulin lispro (ADMELOG) corrective regimen sliding scale   SubCutaneous three times a day before meals  memantine 10 milliGRAM(s) Oral two times a day  metoprolol tartrate 12.5 milliGRAM(s) Oral two times a day  pancrelipase  (CREON 24,000 Lipase Units) 1 Capsule(s) Oral three times a day with meals  QUEtiapine 25 milliGRAM(s) Oral two times a day  sertraline 25 milliGRAM(s) Oral at bedtime  sodium chloride 0.9% Bolus 500 milliLiter(s) IV Bolus once    MEDICATIONS  (PRN):  acetaminophen     Tablet .. 650 milliGRAM(s) Oral every 6 hours PRN Temp greater or equal to 38C (100.4F), Mild Pain (1 - 3)  albuterol/ipratropium for Nebulization 3 milliLiter(s) Nebulizer every 6 hours PRN Shortness of Breath and/or Wheezing  dextrose Oral Gel 15 Gram(s) Oral once PRN Blood Glucose LESS THAN 70 milliGRAM(s)/deciliter  melatonin 3 milliGRAM(s) Oral at bedtime PRN Insomnia  oxyCODONE    IR 2.5 milliGRAM(s) Oral every 4 hours PRN Moderate Pain (4 - 6)  oxyCODONE    IR 5 milliGRAM(s) Oral every 4 hours PRN Severe Pain (7 - 10)

## 2024-04-10 NOTE — PROVIDER CONTACT NOTE (OTHER) - ACTION/TREATMENT ORDERED:
provider notified. no new orders at this time. cont hourly rounds, attend to safety/needs as needed.

## 2024-04-10 NOTE — PHYSICAL THERAPY INITIAL EVALUATION ADULT - ACTIVE RANGE OF MOTION EXAMINATION, REHAB EVAL
BLE significant flexion contracture R>L/bilateral upper extremity Active ROM was WFL (within functional limits)/deficits as listed below

## 2024-04-10 NOTE — PHYSICAL THERAPY INITIAL EVALUATION ADULT - ADDITIONAL COMMENTS
Patient is A&Ox0-1, hx of dementia; previous level of function hx rec' d from HPI & case coordination notes; Patient lives alone w/24 hrs x 5 days support; weekend support is provided by family members; As per notes patient is baseline dependent for ADLs & bed bound

## 2024-04-10 NOTE — PROGRESS NOTE ADULT - SUBJECTIVE AND OBJECTIVE BOX
Follow Up:    ESBL bacteremia    Interval History/ROS:  VSS ON. Repeat BCx pending. Patient was seen and examined at bedside. No fever.     Allergies  No Known Allergies        ANTIMICROBIALS:  ertapenem  IVPB 1000 every 24 hours      OTHER MEDS:  MEDICATIONS  (STANDING):  acetaminophen     Tablet .. 650 every 6 hours PRN  albuterol/ipratropium for Nebulization 3 every 6 hours PRN  dextrose 50% Injectable 12.5 once  dextrose 50% Injectable 25 once  dextrose Oral Gel 15 once PRN  doxazosin 2 at bedtime  enoxaparin Injectable 40 every 24 hours  famotidine    Tablet 20 daily  glucagon  Injectable 1 once  insulin glargine Injectable (LANTUS) 4 at bedtime  insulin lispro (ADMELOG) corrective regimen sliding scale  three times a day before meals  melatonin 3 at bedtime PRN  memantine 10 two times a day  oxyCODONE    IR 2.5 every 4 hours PRN  oxyCODONE    IR 5 every 4 hours PRN  pancrelipase  (CREON 24,000 Lipase Units) 1 three times a day with meals  QUEtiapine 25 two times a day  sertraline 25 at bedtime      Vital Signs Last 24 Hrs  T(C): 36.9 (10 Apr 2024 04:22), Max: 36.9 (10 Apr 2024 04:22)  T(F): 98.5 (10 Apr 2024 04:22), Max: 98.5 (10 Apr 2024 04:22)  HR: 92 (10 Apr 2024 04:22) (92 - 99)  BP: 128/68 (10 Apr 2024 04:22) (118/72 - 128/68)  BP(mean): --  RR: 18 (10 Apr 2024 04:22) (17 - 18)  SpO2: 99% (10 Apr 2024 04:22) (96% - 99%)    Parameters below as of 10 Apr 2024 04:22  Patient On (Oxygen Delivery Method): room air          PHYSICAL EXAM:  Constitutional: NAD at rest  Cardiovascular:   normal S1, S2, no murmur, RRR  Respiratory:  clear BS bilaterally, no wheezes, no rales  GI:  soft, non-tender  :  no suprapubic tenderness; Connolly collecting clear urine  Neurologic: awake and oriented x0  Skin:  Pubic area w/ ecchymosis, improved from initial exam                            9.3    7.73  )-----------( 384      ( 10 Apr 2024 07:09 )             29.9       04-10    133<L>  |  101  |  9   ----------------------------<  83  3.8   |  22  |  <0.30<L>    Ca    7.7<L>      10 Apr 2024 07:08    TPro  5.5<L>  /  Alb  2.5<L>  /  TBili  0.9  /  DBili  x   /  AST  24  /  ALT  20  /  AlkPhos  89  04-09      Urinalysis Basic - ( 10 Apr 2024 07:08 )    Color: x / Appearance: x / SG: x / pH: x  Gluc: 83 mg/dL / Ketone: x  / Bili: x / Urobili: x   Blood: x / Protein: x / Nitrite: x   Leuk Esterase: x / RBC: x / WBC x   Sq Epi: x / Non Sq Epi: x / Bacteria: x        MICROBIOLOGY:  v  .Blood Blood-Venous  04-07-24   No growth at 48 Hours  --  Blood Culture PCR  Escherichia coli ESBL      Clean Catch Clean Catch (Midstream)  04-04-24   No growth  --  --      .Blood Blood-Peripheral  04-04-24   No growth at 5 days  --  --      .Blood Blood-Peripheral  04-04-24   No growth at 5 days  --  --                RADIOLOGY:  Imaging below independently reviewed.  < from: CT Abdomen and Pelvis w/ IV Cont (04.04.24 @ 18:12) >    ACC: [id number] EXAM:  CT ABDOMEN AND PELVIS IC   ORDERED BY:  KEKE OGDEN     PROCEDURE DATE:  04/04/2024          INTERPRETATION:  CLINICAL INFORMATION: Right leg wound with concern for   necrotizing fasciitis. Bruising to abd/butt/pelvis. non ambulatory    COMPARISON: None.    CONTRAST/COMPLICATIONS:  IV Contrast: Omnipaque 350  90 cc administered   10 cc discarded  Oral Contrast: NONE  Complications: None reported at time of study completion    PROCEDURE:  CT of the Abdomen and Pelvis was performed, extending to the knees.  Sagittal and coronal reformats were performed.    FINDINGS:  LOWER CHEST: Dependent areas of subsegmental atelectasis. Coronary artery   and aortic calcifications.    LIVER: Steatosis.  BILE DUCTS: Tiny volume of pneumobilia in the left hepatic lobe,   correlate with procedural/surgical history.  GALLBLADDER: Cholecystectomy.  SPLEEN: Within normal limits.  PANCREAS: Within normal limits.  ADRENALS: Within normal limits.  KIDNEYS/URETERS: Within normal limits.    BLADDER: Within normal limits.  REPRODUCTIVE ORGANS: Uterus and adnexa within normal limits.    BOWEL: No bowel obstruction. Appendix is not visualized. Distal   gastrectomy and gastrojejunostomy.  PERITONEUM: No ascites.  VESSELS: Atherosclerotic changes.  RETROPERITONEUM/LYMPH NODES: No lymphadenopathy.  ABDOMINAL WALL: The right hip abductor muscles are asymmetrically   enlarged and heterogeneous, extending down to the distal femoral   diaphysis. No internal walled off collection. There is asymmetric edema   in the right perineum and gluteal subcutaneous tissues. No subcutaneous   or intramuscular air to suggest necrotizing infection.  2.4 x 1.5 cm ovoid low-attenuation superficial lesion overlying the lower   lumbar spine, likely sebaceouscyst.    BONES: Degenerative changes. No evidence of osteomyelitis.   Age-indeterminate moderate to severe L1 and T11 compression deformities.    IMPRESSION:  Right peroneal/buttock edema with heterogeneity and asymmetric   enlargement of the right hip abductor muscles, extending to the level of   the distal femoral diaphysis. No associated emphysematous change suggest   necrotizing fasciitis. No walled off collection. Primary differential   includes intramuscular hematoma with overlying contusion and cellulitis   with underlying infection extending into the muscle with resultant   phlegmonous change.    Dr. Hutson discussed this finding with Dr. Ogden on 4/4/2024 6:38 PM .    Age-indeterminate moderate to severe L1 and T11 compression deformities.    Trace pneumobilia. Correlate with procedural history.    --- End of Report ---           TORY HUTSON MD; Resident Radiologist  This document has been electronically signed.  LAURA DURAND MD; Attending Radiologist  This document has been electronically signed. Apr 4 2024  7:18PM    < end of copied text >

## 2024-04-10 NOTE — PROGRESS NOTE ADULT - SUBJECTIVE AND OBJECTIVE BOX
Patient is a 83y old  Female who presents with a chief complaint of Other hemorrhage     (10 Apr 2024 13:54)        SUBJECTIVE / OVERNIGHT EVENTS: Patient had no acute events overnight. Patient seen and examined at bedside this morning. Unable to partipcate in ROS.     Tele: afib 90-100s    ROS:    MEDICATIONS  (STANDING):  ascorbic acid 500 milliGRAM(s) Oral <User Schedule>  cadexomer iodine 0.9% Gel 1 Application(s) Topical daily  calcium carbonate   1250 mG (OsCal) 1 Tablet(s) Oral daily  chlorhexidine 2% Cloths 1 Application(s) Topical <User Schedule>  cholecalciferol 1000 Unit(s) Oral daily  cyanocobalamin 1000 MICROGram(s) Oral daily  dextrose 10% Bolus 125 milliLiter(s) IV Bolus once  dextrose 5%. 1000 milliLiter(s) (100 mL/Hr) IV Continuous <Continuous>  dextrose 5%. 1000 milliLiter(s) (50 mL/Hr) IV Continuous <Continuous>  dextrose 50% Injectable 12.5 Gram(s) IV Push once  dextrose 50% Injectable 25 Gram(s) IV Push once  doxazosin 2 milliGRAM(s) Oral at bedtime  enoxaparin Injectable 40 milliGRAM(s) SubCutaneous every 24 hours  ertapenem  IVPB 1000 milliGRAM(s) IV Intermittent every 24 hours  famotidine    Tablet 20 milliGRAM(s) Oral daily  ferrous    sulfate 325 milliGRAM(s) Oral daily  glucagon  Injectable 1 milliGRAM(s) IntraMuscular once  insulin glargine Injectable (LANTUS) 4 Unit(s) SubCutaneous at bedtime  insulin lispro (ADMELOG) corrective regimen sliding scale   SubCutaneous three times a day before meals  memantine 10 milliGRAM(s) Oral two times a day  metoprolol tartrate 12.5 milliGRAM(s) Oral two times a day  mupirocin 2% Nasal 1 Application(s) Both Nostrils two times a day  pancrelipase  (CREON 24,000 Lipase Units) 1 Capsule(s) Oral three times a day with meals  QUEtiapine 25 milliGRAM(s) Oral two times a day  sertraline 25 milliGRAM(s) Oral at bedtime  sodium chloride 0.9% Bolus 500 milliLiter(s) IV Bolus once    MEDICATIONS  (PRN):  acetaminophen     Tablet .. 650 milliGRAM(s) Oral every 6 hours PRN Temp greater or equal to 38C (100.4F), Mild Pain (1 - 3)  albuterol/ipratropium for Nebulization 3 milliLiter(s) Nebulizer every 6 hours PRN Shortness of Breath and/or Wheezing  dextrose Oral Gel 15 Gram(s) Oral once PRN Blood Glucose LESS THAN 70 milliGRAM(s)/deciliter  melatonin 3 milliGRAM(s) Oral at bedtime PRN Insomnia  oxyCODONE    IR 2.5 milliGRAM(s) Oral every 4 hours PRN Moderate Pain (4 - 6)  oxyCODONE    IR 5 milliGRAM(s) Oral every 4 hours PRN Severe Pain (7 - 10)      Vital Signs Last 24 Hrs  T(C): 36.8 (10 Apr 2024 13:30), Max: 36.9 (10 Apr 2024 04:22)  T(F): 98.2 (10 Apr 2024 13:30), Max: 98.5 (10 Apr 2024 04:22)  HR: 110 (10 Apr 2024 13:30) (92 - 110)  BP: 132/73 (10 Apr 2024 13:30) (116/72 - 132/73)  BP(mean): --  RR: 18 (10 Apr 2024 13:30) (17 - 18)  SpO2: 97% (10 Apr 2024 13:30) (97% - 99%)    Parameters below as of 10 Apr 2024 13:30  Patient On (Oxygen Delivery Method): room air      CAPILLARY BLOOD GLUCOSE      POCT Blood Glucose.: 167 mg/dL (10 Apr 2024 12:33)  POCT Blood Glucose.: 90 mg/dL (10 Apr 2024 08:42)  POCT Blood Glucose.: 210 mg/dL (09 Apr 2024 21:47)  POCT Blood Glucose.: 235 mg/dL (09 Apr 2024 17:22)    I&O's Summary    09 Apr 2024 07:01  -  10 Apr 2024 07:00  --------------------------------------------------------  IN: 480 mL / OUT: 850 mL / NET: -370 mL    10 Apr 2024 07:01  -  10 Apr 2024 15:31  --------------------------------------------------------  IN: 360 mL / OUT: 0 mL / NET: 360 mL      PHYSICAL:  GENERAL: NAD, lying comfortably in bed   HEENT:  Atraumatic, Normocephalic, EOMI, conjunctiva and sclera clear, no LAD  CHEST/LUNG: Clear to auscultation bilaterally; No wheeze. no wob  HEART: RRR, S1 and S2 No murmurs, rubs, or gallops  ABDOMEN: Soft, Nontender, Nondistended; Bowel sounds present. Connolly  EXTREMITIES:  legs bent, bruising on right inner thigh, right labial fold and right lower abdomen. No erythema or discharge  NEURO: AAOx0-1, non-focal  SKIN: No additional rashes or lesions  LABS:                        9.3    7.73  )-----------( 384      ( 10 Apr 2024 07:09 )             29.9     04-10    133<L>  |  101  |  9   ----------------------------<  83  3.8   |  22  |  <0.30<L>    Ca    7.7<L>      10 Apr 2024 07:08    TPro  5.5<L>  /  Alb  2.5<L>  /  TBili  0.9  /  DBili  x   /  AST  24  /  ALT  20  /  AlkPhos  89  04-09          Urinalysis Basic - ( 10 Apr 2024 07:08 )    Color: x / Appearance: x / SG: x / pH: x  Gluc: 83 mg/dL / Ketone: x  / Bili: x / Urobili: x   Blood: x / Protein: x / Nitrite: x   Leuk Esterase: x / RBC: x / WBC x   Sq Epi: x / Non Sq Epi: x / Bacteria: x          RADIOLOGY & ADDITIONAL TESTS:      Labs Personally Reviewed  Imaging Personally Reviewed  Consultant(s) Notes Reviewed

## 2024-04-10 NOTE — PHYSICAL THERAPY INITIAL EVALUATION ADULT - PERTINENT HX OF CURRENT PROBLEM, REHAB EVAL
83-yo F w/ PMH of CAD s/p PCI on Brilinta, dementia (A&O x1-2), pancreatic CA s/p Whipple, and DM, admitted with labial bruise likely a/w trauma, found to have ESBL E coli bacteremia. CTA chest w/o PE or pneumonia. CTAP w/ R hip adductor muscle enlargement extending to the level of distal femoral diaphysis. No evidence of necrotizing infection. No fever. UA neg for infection.

## 2024-04-10 NOTE — DIETITIAN INITIAL EVALUATION ADULT - PROBLEM SELECTOR PLAN 2
- ECG showing atrial fibrillation with regular rate  - No prior history of atrial fibrillation per daughter, could be acute response in setting of  blood loss and pain 2/2 hematoma  - Monitor on telemetry  - Get repeat ECG in AM  - CHADsVASc score of 4, but given new hematoma w/ Hb drop on just Brillinta, would not start FD AC at the moment  - Will need-risk benefit discussion later in hospital course regarding full-dose AC if Afib is persisting

## 2024-04-10 NOTE — CHART NOTE - NSCHARTNOTEFT_GEN_A_CORE
Patient's evening labs concerning for drop in HH 10.2>9.3>8.4 today. Patient prior imaging concerning for right peroneal/buttock hematoma with HH remaining stable and no surgical intervention needed at the time. Pt examined in the room, AOx0 bedbound, with prior bruising on right abdomen and right labial region, no new bruising noted. Pt mentating at baseline, not in acute distress, BP stable, HR elevated likely due to anemia vs. new afib.     Discussed with daughter/HCP Gamal Delgadillo at the bedside about concern for bleed, blood consent obtained and HCP forms in paper chart. As per daughter/HCP Gamal Delgadillo patient's Bahai is [employer] Bahai and patient wanted to live as long as possible, would like for patient to remain full code.                          8.4    8.22  )-----------( 330      ( 10 Apr 2024 16:59 )             26.6       Vital Signs Last 24 Hrs  T(C): 36.8 (10 Apr 2024 13:30), Max: 36.9 (10 Apr 2024 04:22)  T(F): 98.2 (10 Apr 2024 13:30), Max: 98.5 (10 Apr 2024 04:22)  HR: 110 (10 Apr 2024 13:30) (92 - 110)  BP: 132/73 (10 Apr 2024 13:30) (116/72 - 132/73)  BP(mean): --  ABP: --  ABP(mean): --  RR: 18 (10 Apr 2024 13:30) (17 - 18)  SpO2: 97% (10 Apr 2024 13:30) (97% - 99%)    O2 Parameters below as of 10 Apr 2024 13:30  Patient On (Oxygen Delivery Method): room air      Plan:  - Blood consent obtained with HCP/daughter   - F/u repeat CBC, T&S in the AM  - CT abd/pelvis with IV contrast urgent ordered  - Vital signs Q4H  - Discussed with attending Dr. Ambriz and in agreement with above    Breanna Mccarty PA-C  Department of Medicine Patient's evening labs concerning for drop in HH 10.2>9.3>8.4 today. Patient prior imaging concerning for right peroneal/buttock hematoma with HH remaining stable and no surgical intervention needed at the time. Pt examined in the room, AOx0 bedbound, with prior bruising on right abdomen and right labial region, no new bruising noted, licona without hematuria. Pt mentating at baseline, not in acute distress, BP stable, HR elevated likely due to anemia vs. new afib.     Discussed with daughter/HCP Gamal Delgadillo at the bedside about concern for bleed, blood consent obtained and HCP forms in paper chart. As per daughter/HCP Gamal Delgadillo patient's Religious is [employer] and patient wanted to live as long as possible, would like for patient to remain full code.                          8.4    8.22  )-----------( 330      ( 10 Apr 2024 16:59 )             26.6       Vital Signs Last 24 Hrs  T(C): 36.8 (10 Apr 2024 13:30), Max: 36.9 (10 Apr 2024 04:22)  T(F): 98.2 (10 Apr 2024 13:30), Max: 98.5 (10 Apr 2024 04:22)  HR: 110 (10 Apr 2024 13:30) (92 - 110)  BP: 132/73 (10 Apr 2024 13:30) (116/72 - 132/73)  BP(mean): --  ABP: --  ABP(mean): --  RR: 18 (10 Apr 2024 13:30) (17 - 18)  SpO2: 97% (10 Apr 2024 13:30) (97% - 99%)    O2 Parameters below as of 10 Apr 2024 13:30  Patient On (Oxygen Delivery Method): room air      Plan:  - Blood consent obtained with HCP/daughter   - F/u repeat CBC, T&S in the AM  - CT abd/pelvis with IV contrast urgent ordered  - Vital signs Q4H  - Discussed with attending Dr. Ambriz and in agreement with above    Breanna Mccarty PA-C  Department of Medicine

## 2024-04-10 NOTE — CONSULT NOTE ADULT - SUBJECTIVE AND OBJECTIVE BOX
DATE OF SERVICE: 04-10-24 @ 23:00    CHIEF COMPLAINT:Patient is a 83y old  Female who presents with a chief complaint of Intramuscular hematoma (10 Apr 2024 15:31)      HISTORY OF PRESENT ILLNESS:HPI:  History obtained by patient's daughter at bedside, Shirley. Patient is hard of hearing but does answer some questions, moaning intermittently. 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 She developed bruising on the labia on Monday around 3 days ago, but now there has been significant expansion of the bruising today with spread to the right thigh and abdominal area. She had an ultrasound done on Monday which showed a solid mixed cystic mass 4x8cm in the right thigh most likely representing a hematoma. The patient's PCP upon receiving the result, called the daughter and told her to bring her to the hospital. Had a CBC done on 4/2 which showed an Hb of 11.8. The patient lives at home with an aide, has 2 daughters who visits her. Per daughter at bedside, there has been no history of falls at home, however she is known to bruise very easily. Daughter states that patient has a bruise on her right arm that she saw happen when the aide gripped her wrist, stated that it didn't look like a lot of force was used. She believes the hematoma may have happened possibly from the patient being transferred from her bed to her wheelchair as that's used to move the patient. Patient was taken off of ASA 2-3 years ago and has is not on any full-dose AC. Brought to the ED for further evaluation. At bedside the patient is moaning intermittently, but does know her name, responds to some questions if asked loudly, and when her hip was touched, stated that it hurts. Daughter states that the patient usually knows her name and if she is in her house, doesn't really know she is in a hospital, has very bad short-term memory.    In the ED, she was afebrile and hemodynamically stable, saturating well on room air. CBC w/ normocytic anemia of 10, coags and CMP wnl except for hyperglycemia and hypoalbuminemia of 2.8. UA wnl. CT abdomen/pelvis showing R peroneal/buttock edema w/ heterogeneity and asymmetric enlargement of the R hip adductor muscles extending to the level of the distal femoral diaphysis, differential being IM hematoma w/ overlying contusion vs cellulitis w/ muscular extension and phlegmonous change. Age-indeterminate mod-severe T11 and L1 compression deformities also seen (documented to be present in HIE note in 2019). Was given IV tylenol, Vanc/Zosyn/Clindamycin in the ED.   (04 Apr 2024 21:21)      PAST MEDICAL & SURGICAL HISTORY:  CAD (coronary artery disease)      Dementia      Pancreatic cancer      Diabetes mellitus      GERD (gastroesophageal reflux disease)      H/O Whipple procedure              MEDICATIONS:  doxazosin 2 milliGRAM(s) Oral at bedtime  metoprolol tartrate 12.5 milliGRAM(s) Oral two times a day    ertapenem  IVPB 1000 milliGRAM(s) IV Intermittent every 24 hours    albuterol/ipratropium for Nebulization 3 milliLiter(s) Nebulizer every 6 hours PRN    acetaminophen     Tablet .. 650 milliGRAM(s) Oral every 6 hours PRN  melatonin 3 milliGRAM(s) Oral at bedtime PRN  memantine 10 milliGRAM(s) Oral two times a day  oxyCODONE    IR 2.5 milliGRAM(s) Oral every 4 hours PRN  oxyCODONE    IR 5 milliGRAM(s) Oral every 4 hours PRN  QUEtiapine 25 milliGRAM(s) Oral two times a day  sertraline 25 milliGRAM(s) Oral at bedtime    famotidine    Tablet 20 milliGRAM(s) Oral daily  pancrelipase  (CREON 24,000 Lipase Units) 1 Capsule(s) Oral three times a day with meals    dextrose 50% Injectable 12.5 Gram(s) IV Push once  dextrose 50% Injectable 25 Gram(s) IV Push once  dextrose Oral Gel 15 Gram(s) Oral once PRN  glucagon  Injectable 1 milliGRAM(s) IntraMuscular once  insulin glargine Injectable (LANTUS) 4 Unit(s) SubCutaneous at bedtime  insulin lispro (ADMELOG) corrective regimen sliding scale   SubCutaneous three times a day before meals    ascorbic acid 500 milliGRAM(s) Oral <User Schedule>  cadexomer iodine 0.9% Gel 1 Application(s) Topical daily  calcium carbonate   1250 mG (OsCal) 1 Tablet(s) Oral daily  chlorhexidine 2% Cloths 1 Application(s) Topical <User Schedule>  cholecalciferol 1000 Unit(s) Oral daily  cyanocobalamin 1000 MICROGram(s) Oral daily  dextrose 10% Bolus 125 milliLiter(s) IV Bolus once  dextrose 5%. 1000 milliLiter(s) IV Continuous <Continuous>  dextrose 5%. 1000 milliLiter(s) IV Continuous <Continuous>  ferrous    sulfate 325 milliGRAM(s) Oral daily  multivitamin 1 Tablet(s) Oral daily  mupirocin 2% Nasal 1 Application(s) Both Nostrils two times a day      FAMILY HISTORY:      Non-contributory    SOCIAL HISTORY:    [ ] not a smoker    Allergies    No Known Allergies    Intolerances    	    REVIEW OF SYSTEMS:  CONSTITUTIONAL: No fever  EYES: No eye pain, visual disturbances, or discharge  ENMT:  No difficulty hearing, tinnitus  NECK: No pain or stiffness  RESPIRATORY: No cough, wheezing,  CARDIOVASCULAR: No chest pain, palpitations, passing out, dizziness, or leg swelling  GASTROINTESTINAL:  No nausea, vomiting, diarrhea or constipation. No melena.  GENITOURINARY: No dysuria, hematuria  NEUROLOGICAL: No stroke like symptoms  SKIN: No burning or lesions   ENDOCRINE: No heat or cold intolerance  MUSCULOSKELETAL: No joint pain or swelling  PSYCHIATRIC: No  anxiety, mood swings  HEME/LYMPH: No bleeding gums  ALLERGY AND IMMUNOLOGIC: No hives or eczema	    All other ROS negative    PHYSICAL EXAM:  T(C): 36.3 (04-10-24 @ 20:27), Max: 36.9 (04-10-24 @ 04:22)  HR: 88 (04-10-24 @ 20:27) (84 - 110)  BP: 132/68 (04-10-24 @ 20:27) (114/81 - 132/73)  RR: 18 (04-10-24 @ 20:27) (18 - 18)  SpO2: 99% (04-10-24 @ 20:27) (97% - 99%)  Wt(kg): --  I&O's Summary    09 Apr 2024 07:01  -  10 Apr 2024 07:00  --------------------------------------------------------  IN: 480 mL / OUT: 850 mL / NET: -370 mL    10 Apr 2024 07:01  -  10 Apr 2024 23:00  --------------------------------------------------------  IN: 360 mL / OUT: 0 mL / NET: 360 mL        Appearance: Normal	  HEENT:   Normal oral mucosa, EOMI	  Cardiovascular:  S1 S2, No JVD,    Respiratory: Lungs clear to auscultation	  Psychiatry: Alert  Gastrointestinal:  Soft, Non-tender, + BS	  Skin: No rashes   Neurologic: Non-focal  Extremities:  No edema  Vascular: Peripheral pulses palpable    	    	  	  CARDIAC MARKERS:  Labs personally reviewed by me                                  9.0    6.69  )-----------( 274      ( 10 Apr 2024 22:37 )             31.6     04-10    133<L>  |  101  |  9   ----------------------------<  83  3.8   |  22  |  <0.30<L>    Ca    7.7<L>      10 Apr 2024 07:08    TPro  5.5<L>  /  Alb  2.5<L>  /  TBili  0.9  /  DBili  x   /  AST  24  /  ALT  20  /  AlkPhos  89  04-09          EKG: Personally reviewed by me - AF  Radiology: Personally reviewed by me - CTA chest No pulmonary embolism or evidence for pneumonia.  Trace bilateral pleural effusions.        Assessment and Plan: 	  84 y/o female w/ PMHx CAD s/p PCI, 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 CT showing R peroneal/buttock edema with heterogeneity and asymmetric enlargement of the right hip abductor muscles, extending to the level of the distal femoral diaphysis suspicious for IM hematoma  Admitted for Hb monitoring complicated with sepsis 2/2 GNR bacteremia of unclear source vs containmation      Problem/Plan - 1:  ·  Problem: Atrial fibrillation.   ·  Plan:  Not a candidate to start AC 2/2 blood loss 2/2 hematoma though CHADsVASc score of 4  - was NSR 70-80s but afib 4/10. Start metoprolol 12.5mg bid.    - Metoprolol 12.5mg BID for rate control    Problem/Plan - 2:  ·  Problem: CAD    ·  Plan: - On Brilinta for stent placed 4 years ago, hold for now given hematoma. hgb stable  - Ideally would start SAPT with ASA 81mg.    Problem/Plan - 3:  ·  Problem: Diabetes mellitus.   ·  Plan: - Currently on Lantus 4u QHS + humalog ISS at home  - C/w Lantus 4u QHS + SAMUEL while inpatient.     Problem/Plan - 4:  ·  Problem: Prophylactic measure.   ·  Plan; DVT PPX with SCD             Differential diagnosis and plan of care discussed with patient after the evaluation. Counseling on diet, nutritional counseling, weight management, exercise and medication compliance was done.   Advanced care planning/advanced directives discussed with patient/family. DNR status including forceful chest compressions to attempt to restart the heart, ventilator support/artificial breathing, electric shock, artificial nutrition, health care proxy, Molst form all discussed with pt. Pt wishes to consider. Sixteen minutes spent on discussing advanced directives.           Stewart Coffey DO [facility]  Cardiovascular Medicine  [address] [place] [address], [address]  Office [phone]  Available via call/text on [employer] Teams

## 2024-04-11 DIAGNOSIS — A41.51 SEPSIS DUE TO ESCHERICHIA COLI [E. COLI]: ICD-10-CM

## 2024-04-11 LAB
ANION GAP SERPL CALC-SCNC: 8 MMOL/L — SIGNIFICANT CHANGE UP (ref 5–17)
BUN SERPL-MCNC: 6 MG/DL — LOW (ref 7–23)
CALCIUM SERPL-MCNC: 7.6 MG/DL — LOW (ref 8.4–10.5)
CHLORIDE SERPL-SCNC: 101 MMOL/L — SIGNIFICANT CHANGE UP (ref 96–108)
CO2 SERPL-SCNC: 25 MMOL/L — SIGNIFICANT CHANGE UP (ref 22–31)
CREAT SERPL-MCNC: <0.3 MG/DL — LOW (ref 0.5–1.3)
EGFR: 104 ML/MIN/1.73M2 — SIGNIFICANT CHANGE UP
GLUCOSE BLDC GLUCOMTR-MCNC: 138 MG/DL — HIGH (ref 70–99)
GLUCOSE BLDC GLUCOMTR-MCNC: 165 MG/DL — HIGH (ref 70–99)
GLUCOSE BLDC GLUCOMTR-MCNC: 171 MG/DL — HIGH (ref 70–99)
GLUCOSE BLDC GLUCOMTR-MCNC: 93 MG/DL — SIGNIFICANT CHANGE UP (ref 70–99)
GLUCOSE SERPL-MCNC: 74 MG/DL — SIGNIFICANT CHANGE UP (ref 70–99)
HCT VFR BLD CALC: 31.4 % — LOW (ref 34.5–45)
HGB BLD-MCNC: 9.9 G/DL — LOW (ref 11.5–15.5)
MCHC RBC-ENTMCNC: 28.9 PG — SIGNIFICANT CHANGE UP (ref 27–34)
MCHC RBC-ENTMCNC: 31.5 GM/DL — LOW (ref 32–36)
MCV RBC AUTO: 91.5 FL — SIGNIFICANT CHANGE UP (ref 80–100)
NRBC # BLD: 0 /100 WBCS — SIGNIFICANT CHANGE UP (ref 0–0)
PLATELET # BLD AUTO: 304 K/UL — SIGNIFICANT CHANGE UP (ref 150–400)
POTASSIUM SERPL-MCNC: 3.3 MMOL/L — LOW (ref 3.5–5.3)
POTASSIUM SERPL-SCNC: 3.3 MMOL/L — LOW (ref 3.5–5.3)
RBC # BLD: 3.43 M/UL — LOW (ref 3.8–5.2)
RBC # FLD: 16.4 % — HIGH (ref 10.3–14.5)
SODIUM SERPL-SCNC: 134 MMOL/L — LOW (ref 135–145)
WBC # BLD: 6.89 K/UL — SIGNIFICANT CHANGE UP (ref 3.8–10.5)
WBC # FLD AUTO: 6.89 K/UL — SIGNIFICANT CHANGE UP (ref 3.8–10.5)

## 2024-04-11 PROCEDURE — 99233 SBSQ HOSP IP/OBS HIGH 50: CPT

## 2024-04-11 PROCEDURE — 99232 SBSQ HOSP IP/OBS MODERATE 35: CPT

## 2024-04-11 RX ORDER — ASPIRIN/CALCIUM CARB/MAGNESIUM 324 MG
81 TABLET ORAL DAILY
Refills: 0 | Status: DISCONTINUED | OUTPATIENT
Start: 2024-04-11 | End: 2024-04-11

## 2024-04-11 RX ORDER — POTASSIUM CHLORIDE 20 MEQ
20 PACKET (EA) ORAL
Refills: 0 | Status: COMPLETED | OUTPATIENT
Start: 2024-04-11 | End: 2024-04-11

## 2024-04-11 RX ADMIN — OXYCODONE HYDROCHLORIDE 5 MILLIGRAM(S): 5 TABLET ORAL at 13:10

## 2024-04-11 RX ADMIN — INSULIN GLARGINE 4 UNIT(S): 100 INJECTION, SOLUTION SUBCUTANEOUS at 22:28

## 2024-04-11 RX ADMIN — PREGABALIN 1000 MICROGRAM(S): 225 CAPSULE ORAL at 12:40

## 2024-04-11 RX ADMIN — Medication 1 CAPSULE(S): at 09:23

## 2024-04-11 RX ADMIN — QUETIAPINE FUMARATE 25 MILLIGRAM(S): 200 TABLET, FILM COATED ORAL at 18:27

## 2024-04-11 RX ADMIN — OXYCODONE HYDROCHLORIDE 5 MILLIGRAM(S): 5 TABLET ORAL at 12:38

## 2024-04-11 RX ADMIN — Medication 20 MILLIEQUIVALENT(S): at 09:23

## 2024-04-11 RX ADMIN — SERTRALINE 25 MILLIGRAM(S): 25 TABLET, FILM COATED ORAL at 22:28

## 2024-04-11 RX ADMIN — Medication 1 TABLET(S): at 12:40

## 2024-04-11 RX ADMIN — Medication 2 MILLIGRAM(S): at 22:35

## 2024-04-11 RX ADMIN — Medication 1 APPLICATION(S): at 12:42

## 2024-04-11 RX ADMIN — Medication 20 MILLIEQUIVALENT(S): at 12:40

## 2024-04-11 RX ADMIN — MUPIROCIN 1 APPLICATION(S): 20 OINTMENT TOPICAL at 05:24

## 2024-04-11 RX ADMIN — FAMOTIDINE 20 MILLIGRAM(S): 10 INJECTION INTRAVENOUS at 12:40

## 2024-04-11 RX ADMIN — Medication 1000 UNIT(S): at 12:40

## 2024-04-11 RX ADMIN — Medication 325 MILLIGRAM(S): at 12:40

## 2024-04-11 RX ADMIN — Medication 1: at 13:45

## 2024-04-11 RX ADMIN — Medication 12.5 MILLIGRAM(S): at 05:24

## 2024-04-11 RX ADMIN — Medication 12.5 MILLIGRAM(S): at 18:36

## 2024-04-11 RX ADMIN — MUPIROCIN 1 APPLICATION(S): 20 OINTMENT TOPICAL at 18:27

## 2024-04-11 RX ADMIN — QUETIAPINE FUMARATE 25 MILLIGRAM(S): 200 TABLET, FILM COATED ORAL at 05:24

## 2024-04-11 RX ADMIN — CHLORHEXIDINE GLUCONATE 1 APPLICATION(S): 213 SOLUTION TOPICAL at 06:30

## 2024-04-11 RX ADMIN — Medication 500 MILLIGRAM(S): at 09:23

## 2024-04-11 RX ADMIN — Medication 20 MILLIEQUIVALENT(S): at 13:45

## 2024-04-11 RX ADMIN — Medication 1 TABLET(S): at 12:39

## 2024-04-11 RX ADMIN — Medication 1 CAPSULE(S): at 12:39

## 2024-04-11 RX ADMIN — MEMANTINE HYDROCHLORIDE 10 MILLIGRAM(S): 10 TABLET ORAL at 05:24

## 2024-04-11 RX ADMIN — ERTAPENEM SODIUM 120 MILLIGRAM(S): 1 INJECTION, POWDER, LYOPHILIZED, FOR SOLUTION INTRAMUSCULAR; INTRAVENOUS at 12:39

## 2024-04-11 RX ADMIN — Medication 1 CAPSULE(S): at 18:27

## 2024-04-11 RX ADMIN — MEMANTINE HYDROCHLORIDE 10 MILLIGRAM(S): 10 TABLET ORAL at 18:26

## 2024-04-11 NOTE — PROGRESS NOTE ADULT - SUBJECTIVE AND OBJECTIVE BOX
Follow Up:    ESBL bacteremia    Interval History/ROS:  VSS ON. No leukocytosis. Patient was seen and examined at bedside. Confused. ROS unable to assess.    Allergies  No Known Allergies        ANTIMICROBIALS:  ertapenem  IVPB 1000 every 24 hours      OTHER MEDS:  MEDICATIONS  (STANDING):  acetaminophen     Tablet .. 650 every 6 hours PRN  albuterol/ipratropium for Nebulization 3 every 6 hours PRN  dextrose 50% Injectable 12.5 once  dextrose 50% Injectable 25 once  dextrose Oral Gel 15 once PRN  doxazosin 2 at bedtime  famotidine    Tablet 20 daily  glucagon  Injectable 1 once  insulin glargine Injectable (LANTUS) 4 at bedtime  insulin lispro (ADMELOG) corrective regimen sliding scale  three times a day before meals  melatonin 3 at bedtime PRN  memantine 10 two times a day  metoprolol tartrate 12.5 two times a day  oxyCODONE    IR 2.5 every 4 hours PRN  oxyCODONE    IR 5 every 4 hours PRN  pancrelipase  (CREON 24,000 Lipase Units) 1 three times a day with meals  QUEtiapine 25 two times a day  sertraline 25 at bedtime      Vital Signs Last 24 Hrs  T(C): 36.5 (11 Apr 2024 11:29), Max: 36.8 (10 Apr 2024 13:30)  T(F): 97.7 (11 Apr 2024 11:29), Max: 98.3 (11 Apr 2024 00:10)  HR: 82 (11 Apr 2024 11:29) (74 - 110)  BP: 123/73 (11 Apr 2024 11:29) (111/69 - 132/73)  BP(mean): --  RR: 18 (11 Apr 2024 11:29) (18 - 18)  SpO2: 96% (11 Apr 2024 11:29) (96% - 99%)    Parameters below as of 11 Apr 2024 11:29  Patient On (Oxygen Delivery Method): room air      PHYSICAL EXAM:  Constitutional: NAD at rest  Cardiovascular:   normal S1, S2, no murmur, RRR  Respiratory:  clear BS bilaterally, no wheezes, no rales  GI:  soft, non-tender  :  no suprapubic tenderness; Connolly collecting clear urine  Neurologic: awake and oriented x0  Skin:  Pubic area w/ ecchymosis, improved from initial exam                            9.9    6.89  )-----------( 304      ( 11 Apr 2024 06:55 )             31.4       04-11    134<L>  |  101  |  6<L>  ----------------------------<  74  3.3<L>   |  25  |  <0.30<L>    Ca    7.6<L>      11 Apr 2024 06:54        Urinalysis Basic - ( 11 Apr 2024 06:54 )    Color: x / Appearance: x / SG: x / pH: x  Gluc: 74 mg/dL / Ketone: x  / Bili: x / Urobili: x   Blood: x / Protein: x / Nitrite: x   Leuk Esterase: x / RBC: x / WBC x   Sq Epi: x / Non Sq Epi: x / Bacteria: x        MICROBIOLOGY:  v  .Blood Blood-Peripheral  04-09-24   No growth at 48 Hours  --  --      .Blood Blood-Peripheral  04-09-24   No growth at 48 Hours  --  --      .Blood Blood-Venous  04-07-24   No growth at 72 Hours  --  Blood Culture PCR  Escherichia coli ESBL      Clean Catch Clean Catch (Midstream)  04-04-24   No growth  --  --      .Blood Blood-Peripheral  04-04-24   No growth at 5 days  --  --      .Blood Blood-Peripheral  04-04-24   No growth at 5 days  --  --                RADIOLOGY:  Imaging below independently reviewed.  < from: CT Abdomen and Pelvis w/ IV Cont (04.10.24 @ 21:27) >    ACC: [id number] EXAM:  CT ABDOMEN AND PELVIS IC   ORDERED BY: DRAEN LEBLANC     PROCEDURE DATE:  04/10/2024          INTERPRETATION:  CLINICAL INFORMATION: Concern for right peroneal   hematoma. Downtrending hemoglobin.    COMPARISON: 4/4/2024    CONTRAST/COMPLICATIONS:  IV Contrast: Omnipaque 350  90 cc administered   10 cc discarded  Oral Contrast: NONE  Complications: None reported at time of study completion    PROCEDURE:  CT of the Abdomen and Pelvis was performed.  Sagittal and coronal reformats were performed.    FINDINGS:  LOWER CHEST: Trace pleural effusions. Passive atelectasis at the lung   bases.    LIVER: Within normal limits.  BILE DUCTS: Pneumobilia.  GALLBLADDER: Surgically removed.  SPLEEN: Fatty atrophy.  PANCREAS: Within normal limits.  ADRENALS: Within normal limits.  KIDNEYS/URETERS: Within normal limits.    BLADDER: Partially decompressed with a Connolly catheter.  REPRODUCTIVE ORGANS: Uterus and bilateral adnexa within normal limits.    BOWEL: No bowel obstruction. Distal gastrectomy and gastrojejunostomy.  PERITONEUM: No ascites.  VESSELS: Atherosclerotic calcifications.  RETROPERITONEUM/LYMPH NODES: No lymphadenopathy. No retroperitoneal   hematoma.  ABDOMINAL WALL: Redemonstrated hematoma in the right hip abductor  muscles, less prominent as compared to prior exam. No evidence of active   bleed. Probable sebaceous cyst overlying the spinous processes of the L2   and L3 vertebral bodies.  BONES: Degenerative changes. Compression deformities of the L1 and T11   vertebral bodies.    IMPRESSION:    Redemonstrated hematoma within the right hip abductor muscles without   evidence of active bleed.    Hematoma is less prominent as compared to prior exam. No retroperitoneal   hematoma.    --- End of Report ---           ALON ROLON MD; Resident Radiologist  This document has been electronically signed.  LAURA DURAND MD; Attending Radiologist  This document has been electronically signed. Apr 11 2024  8:35AM    < end of copied text >

## 2024-04-11 NOTE — PROGRESS NOTE ADULT - SUBJECTIVE AND OBJECTIVE BOX
Patient is a 83y old  Female who presents with a chief complaint of Intramuscular hematoma (10 Apr 2024 17:59)        SUBJECTIVE / OVERNIGHT EVENTS: Patient had no acute events overnight. Patient seen and examined at bedside this morning. Patient confused, repeatedly stating she's not sure what to do.     ROS: unable to assess    MEDICATIONS  (STANDING):  ascorbic acid 500 milliGRAM(s) Oral <User Schedule>  cadexomer iodine 0.9% Gel 1 Application(s) Topical daily  calcium carbonate   1250 mG (OsCal) 1 Tablet(s) Oral daily  chlorhexidine 2% Cloths 1 Application(s) Topical <User Schedule>  cholecalciferol 1000 Unit(s) Oral daily  cyanocobalamin 1000 MICROGram(s) Oral daily  dextrose 10% Bolus 125 milliLiter(s) IV Bolus once  dextrose 5%. 1000 milliLiter(s) (100 mL/Hr) IV Continuous <Continuous>  dextrose 5%. 1000 milliLiter(s) (50 mL/Hr) IV Continuous <Continuous>  dextrose 50% Injectable 12.5 Gram(s) IV Push once  dextrose 50% Injectable 25 Gram(s) IV Push once  doxazosin 2 milliGRAM(s) Oral at bedtime  ertapenem  IVPB 1000 milliGRAM(s) IV Intermittent every 24 hours  famotidine    Tablet 20 milliGRAM(s) Oral daily  ferrous    sulfate 325 milliGRAM(s) Oral daily  glucagon  Injectable 1 milliGRAM(s) IntraMuscular once  insulin glargine Injectable (LANTUS) 4 Unit(s) SubCutaneous at bedtime  insulin lispro (ADMELOG) corrective regimen sliding scale   SubCutaneous three times a day before meals  memantine 10 milliGRAM(s) Oral two times a day  metoprolol tartrate 12.5 milliGRAM(s) Oral two times a day  multivitamin 1 Tablet(s) Oral daily  mupirocin 2% Nasal 1 Application(s) Both Nostrils two times a day  pancrelipase  (CREON 24,000 Lipase Units) 1 Capsule(s) Oral three times a day with meals  potassium chloride    Tablet ER 20 milliEquivalent(s) Oral every 2 hours  QUEtiapine 25 milliGRAM(s) Oral two times a day  sertraline 25 milliGRAM(s) Oral at bedtime    MEDICATIONS  (PRN):  acetaminophen     Tablet .. 650 milliGRAM(s) Oral every 6 hours PRN Temp greater or equal to 38C (100.4F), Mild Pain (1 - 3)  albuterol/ipratropium for Nebulization 3 milliLiter(s) Nebulizer every 6 hours PRN Shortness of Breath and/or Wheezing  dextrose Oral Gel 15 Gram(s) Oral once PRN Blood Glucose LESS THAN 70 milliGRAM(s)/deciliter  melatonin 3 milliGRAM(s) Oral at bedtime PRN Insomnia  oxyCODONE    IR 2.5 milliGRAM(s) Oral every 4 hours PRN Moderate Pain (4 - 6)  oxyCODONE    IR 5 milliGRAM(s) Oral every 4 hours PRN Severe Pain (7 - 10)      Vital Signs Last 24 Hrs  T(C): 36.5 (11 Apr 2024 11:29), Max: 36.8 (10 Apr 2024 13:30)  T(F): 97.7 (11 Apr 2024 11:29), Max: 98.3 (11 Apr 2024 00:10)  HR: 82 (11 Apr 2024 11:29) (74 - 110)  BP: 123/73 (11 Apr 2024 11:29) (111/69 - 132/73)  BP(mean): --  RR: 18 (11 Apr 2024 11:29) (18 - 18)  SpO2: 96% (11 Apr 2024 11:29) (96% - 99%)    Parameters below as of 11 Apr 2024 11:29  Patient On (Oxygen Delivery Method): room air      CAPILLARY BLOOD GLUCOSE      POCT Blood Glucose.: 93 mg/dL (11 Apr 2024 08:23)  POCT Blood Glucose.: 282 mg/dL (10 Apr 2024 21:40)  POCT Blood Glucose.: 274 mg/dL (10 Apr 2024 18:51)  POCT Blood Glucose.: 274 mg/dL (10 Apr 2024 17:10)  POCT Blood Glucose.: 167 mg/dL (10 Apr 2024 12:33)    I&O's Summary    10 Apr 2024 07:01  -  11 Apr 2024 07:00  --------------------------------------------------------  IN: 360 mL / OUT: 900 mL / NET: -540 mL        PHYSICAL EXAM  GENERAL: NAD, lying comfortably in bed   HEENT:  Atraumatic, Normocephalic, EOMI, conjunctiva and sclera clear, no LAD  CHEST/LUNG: Clear to auscultation bilaterally; No wheeze. no wob  HEART: RRR, S1 and S2 No murmurs, rubs, or gallops  ABDOMEN: Soft, Nontender, Nondistended; Bowel sounds present. Connolly  EXTREMITIES:  legs bent, bruising on right inner thigh, right labial fold and right lower abdomen. No erythema or discharge  NEURO: AAOx0-1, non-focal  SKIN: No additional rashes or lesions    LABS:                        9.9    6.89  )-----------( 304      ( 11 Apr 2024 06:55 )             31.4     04-11    134<L>  |  101  |  6<L>  ----------------------------<  74  3.3<L>   |  25  |  <0.30<L>    Ca    7.6<L>      11 Apr 2024 06:54            Urinalysis Basic - ( 11 Apr 2024 06:54 )    Color: x / Appearance: x / SG: x / pH: x  Gluc: 74 mg/dL / Ketone: x  / Bili: x / Urobili: x   Blood: x / Protein: x / Nitrite: x   Leuk Esterase: x / RBC: x / WBC x   Sq Epi: x / Non Sq Epi: x / Bacteria: x          RADIOLOGY & ADDITIONAL TESTS:  < from: CT Abdomen and Pelvis w/ IV Cont (04.10.24 @ 21:27) >  IMPRESSION:    Redemonstrated hematoma within the right hip abductor muscles without   evidence of active bleed.    Hematoma is less prominent as compared to prior exam. No retroperitoneal   hematoma.    < end of copied text >      Labs Personally Reviewed  Imaging Personally Reviewed  Consultant(s) Notes Reviewed

## 2024-04-11 NOTE — PROGRESS NOTE ADULT - SUBJECTIVE AND OBJECTIVE BOX
DATE OF SERVICE: 04-11-24 @ 14:26    Patient is a 83y old  Female who presents with a chief complaint of Intramuscular hematoma (11 Apr 2024 12:42)      INTERVAL HISTORY: In no acute distress.    REVIEW OF SYSTEMS:  CONSTITUTIONAL: No weakness  EYES/ENT: No visual changes;  No throat pain   NECK: No pain or stiffness  RESPIRATORY: No cough, wheezing; No shortness of breath  CARDIOVASCULAR: No chest pain or palpitations  GASTROINTESTINAL: No abdominal  pain. No nausea, vomiting, or hematemesis  GENITOURINARY: No dysuria, frequency or hematuria  NEUROLOGICAL: No stroke like symptoms  SKIN: No rashes    TELEMETRY Personally reviewed: SR 80-90 paroxysmal with AF   	  MEDICATIONS:  doxazosin 2 milliGRAM(s) Oral at bedtime  metoprolol tartrate 12.5 milliGRAM(s) Oral two times a day        PHYSICAL EXAM:  T(C): 36.5 (04-11-24 @ 11:29), Max: 36.8 (04-11-24 @ 00:10)  HR: 82 (04-11-24 @ 11:29) (74 - 88)  BP: 123/73 (04-11-24 @ 11:29) (111/69 - 132/68)  RR: 18 (04-11-24 @ 11:29) (18 - 18)  SpO2: 96% (04-11-24 @ 11:29) (96% - 99%)  Wt(kg): --  I&O's Summary    10 Apr 2024 07:01  -  11 Apr 2024 07:00  --------------------------------------------------------  IN: 360 mL / OUT: 900 mL / NET: -540 mL    11 Apr 2024 07:01  -  11 Apr 2024 14:26  --------------------------------------------------------  IN: 360 mL / OUT: 0 mL / NET: 360 mL          Appearance: In no distress	  HEENT:    PERRL, EOMI	  Cardiovascular:  S1 S2, No JVD  Respiratory: Lungs clear to auscultation	  Gastrointestinal:  Soft, Non-tender, + BS	  Vascularature:  No edema of LE  Psychiatric: Appropriate affect   Neuro: no acute focal deficits                               9.9    6.89  )-----------( 304      ( 11 Apr 2024 06:55 )             31.4     04-11    134<L>  |  101  |  6<L>  ----------------------------<  74  3.3<L>   |  25  |  <0.30<L>    Ca    7.6<L>      11 Apr 2024 06:54          Labs personally reviewed      ASSESSMENT/PLAN: 	    84 y/o female w/ PMHx CAD s/p PCI, 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 CT showing R peroneal/buttock edema with heterogeneity and asymmetric enlargement of the right hip abductor muscles, extending to the level of the distal femoral diaphysis suspicious for IM hematoma  Admitted for Hb monitoring complicated with sepsis 2/2 GNR bacteremia of unclear source vs containmation      Problem/Plan - 1:  ·  Problem: Atrial fibrillation.   ·  Plan:  Not a candidate to start AC 2/2 blood loss 2/2 hematoma though CHADsVASc score of 4  - was NSR 70-80s but afib 4/10.     - Metoprolol 12.5mg BID for rate control    Problem/Plan - 2:  ·  Problem: CAD    ·  Plan: - On Brilinta for stent placed 4 years ago, hold for now given hematoma. hgb stable  - Ideally would start SAPT with ASA 81mg.    Problem/Plan - 3:  ·  Problem: Diabetes mellitus.   ·  Plan: - Currently on Lantus 4u QHS + humalog ISS at home  - C/w Lantus 4u QHS + SAMUEL while inpatient.     Problem/Plan - 4:  ·  Problem: Prophylactic measure.   ·  Plan; DVT PPX with SCD               Mary Shaw, AG-NP   Stewart Coffey,  [facility]  Cardiovascular Medicine  [address], [address]  Available through call or text on [employer] TEAM[employer]  Office: [phone]

## 2024-04-12 ENCOUNTER — TRANSCRIPTION ENCOUNTER (OUTPATIENT)
Age: 84
End: 2024-04-12

## 2024-04-12 LAB
CULTURE RESULTS: SIGNIFICANT CHANGE UP
GLUCOSE BLDC GLUCOMTR-MCNC: 110 MG/DL — HIGH (ref 70–99)
GLUCOSE BLDC GLUCOMTR-MCNC: 209 MG/DL — HIGH (ref 70–99)
GLUCOSE BLDC GLUCOMTR-MCNC: 209 MG/DL — HIGH (ref 70–99)
GLUCOSE BLDC GLUCOMTR-MCNC: 90 MG/DL — SIGNIFICANT CHANGE UP (ref 70–99)
HCT VFR BLD CALC: 30.3 % — LOW (ref 34.5–45)
HGB BLD-MCNC: 9.1 G/DL — LOW (ref 11.5–15.5)
MCHC RBC-ENTMCNC: 28 PG — SIGNIFICANT CHANGE UP (ref 27–34)
MCHC RBC-ENTMCNC: 30 GM/DL — LOW (ref 32–36)
MCV RBC AUTO: 93.2 FL — SIGNIFICANT CHANGE UP (ref 80–100)
NRBC # BLD: 0 /100 WBCS — SIGNIFICANT CHANGE UP (ref 0–0)
PLATELET # BLD AUTO: 347 K/UL — SIGNIFICANT CHANGE UP (ref 150–400)
RBC # BLD: 3.25 M/UL — LOW (ref 3.8–5.2)
RBC # FLD: 16.6 % — HIGH (ref 10.3–14.5)
SPECIMEN SOURCE: SIGNIFICANT CHANGE UP
WBC # BLD: 7.3 K/UL — SIGNIFICANT CHANGE UP (ref 3.8–10.5)
WBC # FLD AUTO: 7.3 K/UL — SIGNIFICANT CHANGE UP (ref 3.8–10.5)

## 2024-04-12 PROCEDURE — 99232 SBSQ HOSP IP/OBS MODERATE 35: CPT

## 2024-04-12 PROCEDURE — 99233 SBSQ HOSP IP/OBS HIGH 50: CPT

## 2024-04-12 RX ORDER — ASPIRIN/CALCIUM CARB/MAGNESIUM 324 MG
81 TABLET ORAL DAILY
Refills: 0 | Status: DISCONTINUED | OUTPATIENT
Start: 2024-04-12 | End: 2024-04-14

## 2024-04-12 RX ADMIN — Medication 1 TABLET(S): at 11:51

## 2024-04-12 RX ADMIN — Medication 81 MILLIGRAM(S): at 11:54

## 2024-04-12 RX ADMIN — PREGABALIN 1000 MICROGRAM(S): 225 CAPSULE ORAL at 11:52

## 2024-04-12 RX ADMIN — MUPIROCIN 1 APPLICATION(S): 20 OINTMENT TOPICAL at 05:32

## 2024-04-12 RX ADMIN — MEMANTINE HYDROCHLORIDE 10 MILLIGRAM(S): 10 TABLET ORAL at 05:31

## 2024-04-12 RX ADMIN — Medication 12.5 MILLIGRAM(S): at 05:30

## 2024-04-12 RX ADMIN — Medication 1000 UNIT(S): at 11:52

## 2024-04-12 RX ADMIN — FAMOTIDINE 20 MILLIGRAM(S): 10 INJECTION INTRAVENOUS at 11:53

## 2024-04-12 RX ADMIN — Medication 1 TABLET(S): at 11:53

## 2024-04-12 RX ADMIN — Medication 1 CAPSULE(S): at 09:12

## 2024-04-12 RX ADMIN — MEMANTINE HYDROCHLORIDE 10 MILLIGRAM(S): 10 TABLET ORAL at 18:04

## 2024-04-12 RX ADMIN — Medication 1 CAPSULE(S): at 13:15

## 2024-04-12 RX ADMIN — Medication 12.5 MILLIGRAM(S): at 18:04

## 2024-04-12 RX ADMIN — Medication 3 MILLIGRAM(S): at 21:21

## 2024-04-12 RX ADMIN — INSULIN GLARGINE 4 UNIT(S): 100 INJECTION, SOLUTION SUBCUTANEOUS at 21:21

## 2024-04-12 RX ADMIN — Medication 1 APPLICATION(S): at 11:52

## 2024-04-12 RX ADMIN — Medication 2: at 17:55

## 2024-04-12 RX ADMIN — SERTRALINE 25 MILLIGRAM(S): 25 TABLET, FILM COATED ORAL at 21:21

## 2024-04-12 RX ADMIN — ERTAPENEM SODIUM 120 MILLIGRAM(S): 1 INJECTION, POWDER, LYOPHILIZED, FOR SOLUTION INTRAMUSCULAR; INTRAVENOUS at 09:11

## 2024-04-12 RX ADMIN — QUETIAPINE FUMARATE 25 MILLIGRAM(S): 200 TABLET, FILM COATED ORAL at 05:32

## 2024-04-12 RX ADMIN — Medication 650 MILLIGRAM(S): at 18:07

## 2024-04-12 RX ADMIN — Medication 650 MILLIGRAM(S): at 19:21

## 2024-04-12 RX ADMIN — CHLORHEXIDINE GLUCONATE 1 APPLICATION(S): 213 SOLUTION TOPICAL at 05:32

## 2024-04-12 RX ADMIN — QUETIAPINE FUMARATE 25 MILLIGRAM(S): 200 TABLET, FILM COATED ORAL at 18:04

## 2024-04-12 RX ADMIN — MUPIROCIN 1 APPLICATION(S): 20 OINTMENT TOPICAL at 18:05

## 2024-04-12 RX ADMIN — Medication 1 CAPSULE(S): at 18:05

## 2024-04-12 RX ADMIN — Medication 2 MILLIGRAM(S): at 21:21

## 2024-04-12 RX ADMIN — Medication 325 MILLIGRAM(S): at 11:51

## 2024-04-12 NOTE — DISCHARGE NOTE NURSING/CASE MANAGEMENT/SOCIAL WORK - PATIENT PORTAL LINK FT
You can access the FollowMyHealth Patient Portal offered by [facility] by registering at the following website: [email or web address]. By joining [facility]’s FollowMyHealth portal, you will also be able to view your health information using other applications (apps) compatible with our system.

## 2024-04-12 NOTE — DISCHARGE NOTE NURSING/CASE MANAGEMENT/SOCIAL WORK - NSDCPEFALRISK_GEN_ALL_CORE
For information on Fall & Injury Prevention, visit: [email or web address] OR  [email or web address] OR  https://www.cdc.gov/steadi/patient.html

## 2024-04-12 NOTE — DISCHARGE NOTE PROVIDER - CARE PROVIDER_API CALL
ARNALDO SANDOVAL  [address] [place] [id number]  Phone: [phone]  Fax: [phone])-  Follow Up Time: 1 week   ARNALDO SANDOVAL  [address] [place] [address]  Phone: ()-  Fax: ()-  Follow Up Time: 1 week    Nirav Bell  Cardiology  [address] [place] [id number]  Phone: [phone]  Fax: [phone]  Follow Up Time: 2 weeks

## 2024-04-12 NOTE — DISCHARGE NOTE PROVIDER - HOSPITAL COURSE
HPI:  History obtained by patient's daughter at bedside, Shirley. Patient is hard of hearing but does answer some questions, moaning intermittently. 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 She developed bruising on the labia on Monday around 3 days ago, but now there has been significant expansion of the bruising today with spread to the right thigh and abdominal area. She had an ultrasound done on Monday which showed a solid mixed cystic mass 4x8cm in the right thigh most likely representing a hematoma. The patient's PCP upon receiving the result, called the daughter and told her to bring her to the hospital. Had a CBC done on 4/2 which showed an Hb of 11.8. The patient lives at home with an aide, has 2 daughters who visits her. Per daughter at bedside, there has been no history of falls at home, however she is known to bruise very easily. Daughter states that patient has a bruise on her right arm that she saw happen when the aide gripped her wrist, stated that it didn't look like a lot of force was used. She believes the hematoma may have happened possibly from the patient being transferred from her bed to her wheelchair as that's used to move the patient. Patient was taken off of ASA 2-3 years ago and has is not on any full-dose AC. Brought to the ED for further evaluation. At bedside the patient is moaning intermittently, but does know her name, responds to some questions if asked loudly, and when her hip was touched, stated that it hurts. Daughter states that the patient usually knows her name and if she is in her house, doesn't really know she is in a hospital, has very bad short-term memory.    In the ED, she was afebrile and hemodynamically stable, saturating well on room air. CBC w/ normocytic anemia of 10, coags and CMP wnl except for hyperglycemia and hypoalbuminemia of 2.8. UA wnl. CT abdomen/pelvis showing R peroneal/buttock edema w/ heterogeneity and asymmetric enlargement of the R hip adductor muscles extending to the level of the distal femoral diaphysis, differential being IM hematoma w/ overlying contusion vs cellulitis w/ muscular extension and phlegmonous change. Age-indeterminate mod-severe T11 and L1 compression deformities also seen (documented to be present in HIE note in 2019). Was given IV tylenol, Vanc/Zosyn/Clindamycin in the ED.   (04 Apr 2024 21:21)    Hospital Course:      Important Medication Changes and Reason:    Active or Pending Issues Requiring Follow-up:    Advanced Directives:   [ ] Full code  [ ] DNR  [ ] Hospice    Discharge Diagnoses:         HPI:  History obtained by patient's daughter at bedside, Shirley. Patient is hard of hearing but does answer some questions, moaning intermittently. 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 She developed bruising on the labia on Monday around 3 days ago, but now there has been significant expansion of the bruising today with spread to the right thigh and abdominal area. She had an ultrasound done on Monday which showed a solid mixed cystic mass 4x8cm in the right thigh most likely representing a hematoma. The patient's PCP upon receiving the result, called the daughter and told her to bring her to the hospital. Had a CBC done on 4/2 which showed an Hb of 11.8. The patient lives at home with an aide, has 2 daughters who visits her. Per daughter at bedside, there has been no history of falls at home, however she is known to bruise very easily. Daughter states that patient has a bruise on her right arm that she saw happen when the aide gripped her wrist, stated that it didn't look like a lot of force was used. She believes the hematoma may have happened possibly from the patient being transferred from her bed to her wheelchair as that's used to move the patient. Patient was taken off of ASA 2-3 years ago and has is not on any full-dose AC. Brought to the ED for further evaluation. At bedside the patient is moaning intermittently, but does know her name, responds to some questions if asked loudly, and when her hip was touched, stated that it hurts. Daughter states that the patient usually knows her name and if she is in her house, doesn't really know she is in a hospital, has very bad short-term memory.    In the ED, she was afebrile and hemodynamically stable, saturating well on room air. CBC w/ normocytic anemia of 10, coags and CMP wnl except for hyperglycemia and hypoalbuminemia of 2.8. UA wnl. CT abdomen/pelvis showing R peroneal/buttock edema w/ heterogeneity and asymmetric enlargement of the R hip adductor muscles extending to the level of the distal femoral diaphysis, differential being IM hematoma w/ overlying contusion vs cellulitis w/ muscular extension and phlegmonous change. Age-indeterminate mod-severe T11 and L1 compression deformities also seen (documented to be present in HIE note in 2019). Was given IV tylenol, Vanc/Zosyn/Clindamycin in the ED.   (04 Apr 2024 21:21)    Hospital Course:  Pt had CT that showed R peroneal and buttock edema concerning for IM hematoma, Hgb was monitored closely, cards consulted, and pt was restarted on ASA given full AC was too high risk. Hgb stable after initiation of ASA. Course c/b sepsis from ESBL E coli bacteremia from unclear source, now completed 7 days of ertapenem.     Important Medication Changes and Reason:  Stopped brilinta, started ASA    Active or Pending Issues Requiring Follow-up:  Cardiology follow up     Advanced Directives:   [ x] Full code  [ ] DNR  [ ] Hospice    Discharge Diagnoses:  Hematoma  Bacteremia
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
## 2024-04-12 NOTE — DISCHARGE NOTE PROVIDER - NSDCFUADDAPPT_GEN_ALL_CORE_FT
APPTS ARE READY TO BE MADE: [x] YES    Best Family or Patient Contact (if needed):    Additional Information about above appointments (if needed):    1:   2:   3:     Other comments or requests:    APPTS ARE READY TO BE MADE: [x] YES    Best Family or Patient Contact (if needed):    Additional Information about above appointments (if needed):    1:   2:   3:     Other comments or requests:   Patient's daughter advises they do not want our assistance and prefer to coordinate the non - [facility] appointments on their own. No information was provided to the patient.

## 2024-04-12 NOTE — DISCHARGE NOTE PROVIDER - PROVIDER TOKENS
[address]  [facility] [place] [address], [address] [address]  Dept: [phone]    Patient:  Sandip Garcia  YOB: 1960  Date: 1/23/23    The patient is a 57 y.o. female who presents today for consult of the following problems:             HPI:     Sandip Garcia is a 57 y.o. female on whom neurosurgical consultation was requested by BRAULIO Brandt CNP for management of left periventricular cavernoma. Patient was originally seen in the hospital and found to have a extremity hemorrhage cavernoma in the periventricular region of the left lateral ventricle. Patient has not had any lateralizing issues such as focal weakness speech deficits or vision problems recently. She did unfortunately fall and injured her knee for which she is seeing orthopedics. She is newly utilizing a walker as well. Patient describes 2 episodes where she was driving and forgot where she was completely and is generally describing memory loss is one of her primary complaints. It appears that over the last 6 months she is also developed a tremor of both upper extremities. .    No significant palliating or Provoking factors in terms of the tremor. Appears to occur at rest as well as when ambulating. Appears to be moderate in intensity has been going on for 6 months. History:     Past Medical History:   Diagnosis Date    Anxiety     Chronic kidney disease     COPD (chronic obstructive pulmonary disease) (HCC)     COPD exacerbation ([address] 8/5/2021    Depression     Effusion of right knee 3/7/2022    Fracture of ankle 5/14/2020    Hyperlipidemia     Hypertension     Obesity     NARCISO on CPAP 1/19/2018    Osteoarthritis     Proteinuria     Pulmonary embolism (HCC)     Sleep apnea     c-pap.  Doesn't use everynight    SOB (shortness of breath) 5/7/2020    Type 2 diabetes mellitus without complication ([address] Type II or unspecified type diabetes mellitus without mention of complication, not stated as uncontrolled     Von Willebrand disease      Past Surgical History:   Procedure Laterality Date    APPENDECTOMY       SECTION      x3, 1977, 1979, 1983    COLONOSCOPY  2018    with biopsy    COLONOSCOPY N/A 2018    COLONOSCOPY performed by Felisa Morales MD at [address] LAB PROCEDURE      EYE SURGERY Bilateral     cataracts    EYE SURGERY Bilateral     glaucoma    TONSILLECTOMY AND ADENOIDECTOMY       Family History   Problem Relation Age of Onset    Hypertension Mother     Liver Disease Mother     Cancer Father         stomach    Diabetes Sister     Cancer Brother         kidney    No Known Problems Maternal Grandmother     No Known Problems Maternal Grandfather     No Known Problems Paternal Grandmother     No Known Problems Paternal Grandfather     Other Brother         AIDS     Current Outpatient Medications on File Prior to Visit   Medication Sig Dispense Refill    buPROPion (WELLBUTRIN XL) 150 MG extended release tablet       CALTRATE 600+D3 600-20 MG-MCG TABS       polyethylene glycol (GLYCOLAX) 17 GM/SCOOP powder       HYDROcodone-acetaminophen (NORCO) 5-325 MG per tablet take 1 tablet by mouth every 8 hours if needed for pain 40 tablet 0    atorvastatin (LIPITOR) 40 MG tablet Take 1 tablet by mouth daily 90 tablet 1    isosorbide mononitrate (IMDUR) 30 MG extended release tablet Take 1 tablet by mouth daily 90 tablet 1    albuterol sulfate HFA (PROVENTIL;VENTOLIN;PROAIR) 108 (90 Base) MCG/ACT inhaler inhale 2 puffs by mouth and INTO THE LUNGS every 4 hours if needed for wheezing shortness of breath or cough 8.5 g 1    Caltrate 600+D Plus Minerals (CALTRATE) 600-800 MG-UNIT TABS tablet Take 1 tablet by mouth 2 times daily 60 tablet 11    linaCLOtide (LINZESS) 72 MCG CAPS capsule Take 1 capsule by mouth every morning (before breakfast) 90 capsule 1 escitalopram (LEXAPRO) 5 MG tablet Take 5 mg by mouth daily      dapagliflozin (FARXIGA) 5 MG tablet Take 1 tablet by mouth every morning 90 tablet 1    omeprazole (PRILOSEC) 20 MG delayed release capsule take 1 capsule by mouth once daily 90 capsule 1    lisinopril (PRINIVIL;ZESTRIL) 5 MG tablet Take 1 tablet by mouth daily 90 tablet 1    senna (SENOKOT) 8.6 MG tablet Take 1 tablet by mouth 2 times daily 60 tablet 1    allopurinol (ZYLOPRIM) 100 MG tablet take 1 tablet by mouth twice a day 180 tablet 1    tiZANidine (ZANAFLEX) 2 MG tablet Take 2 mg by mouth every 6 hours as needed      Blood Pressure Monitoring (BLOOD PRESSURE CUFF) MISC Use as directed by physician to check blood pressure. Please fit to patient.  1 each 0    traZODone (DESYREL) 50 MG tablet Take 50 mg by mouth nightly      Multiple Vitamins-Minerals (THERAPEUTIC MULTIVITAMIN-MINERALS) tablet Take 1 tablet by mouth daily 30 tablet 11    Blood Pressure Monitoring KIT Use to check blood pressure daily and as needed 1 kit 0    tiotropium-olodaterol (STIOLTO RESPIMAT) 2.5-2.5 MCG/ACT AERS Inhale 2 puffs into the lungs daily 1 each 5    apixaban (ELIQUIS) 5 MG TABS tablet Take 1 tablet by mouth 2 times daily 60 tablet 11    Alcohol Swabs (ALCOHOL PREP) 70 % PADS Use twice daily and as needed to check blood sugars 120 each 3    blood glucose test strips (ONETOUCH ULTRA) strip TEST THREE TIMES DAILY 100 strip 5    ipratropium-albuterol (DUONEB) 0.5-2.5 (3) MG/3ML SOLN nebulizer solution Take 3 mLs by nebulization every 6 hours as needed for Shortness of Breath 360 mL 0    risperiDONE (RISPERDAL) 0.5 MG tablet take 1 tablet by mouth at bedtime      Respiratory Therapy Supplies (NEBULIZER/TUBING/MOUTHPIECE) KIT 1 kit by Does not apply route 4 times daily as needed (wheezing) 1 kit 0    colchicine (COLCRYS) 0.6 MG tablet Take 1 tablet by mouth 2 times daily as needed for Pain (gout pain) Can repeat with 0.6 mg in 1 hour, max 1.8mg daily for acute pain 30 tablet 0    Lancets Misc. (ACCU-CHEK FASTCLIX LANCET) KIT use as directed PLEASE APPROVED NEW DEVICE WHILE WE WAIT FOR PRIOR AUTH. .. FASTCLIX MIGHT BE EAISER TO MANIPULATE 1 kit 0    Accu-Chek FastClix Lancets MISC use 1 LANCET to TEST BLOOD SUGAR one to two times a day 120 each 3    naloxone 4 MG/0.1ML LIQD nasal spray 1 spray by Nasal route as needed for Opioid Reversal 1 each 5    Handicap Placard MISC by Does not apply route Exp 2/3/2026 1 each 0     Current Facility-Administered Medications on File Prior to Visit   Medication Dose Route Frequency Provider Last Rate Last Admin    methylPREDNISolone acetate (DEPO-MEDROL) injection 40 mg  40 mg Intra-artICUlar Once Mario Erazo MD        lidocaine 1 % injection 5 mL  5 mL Intra-artICUlar Once Mario Erazo MD        methylPREDNISolone acetate (DEPO-MEDROL) injection 40 mg  40 mg Intra-artICUlar Once Mario Erazo MD        lidocaine 1 % injection 5 mL  5 mL Intra-artICUlar Once Mario Erazo MD         Social History     Tobacco Use    Smoking status: Former     Packs/day: 0.25     Years: 7.00     Pack years: 1.75     Types: Cigarettes     Start date: 10/16/1977     Quit date: 1983     Years since quittin.0    Smokeless tobacco: Never   Substance Use Topics    Alcohol use: No    Drug use: No       No Known Allergies    Review of Systems  ROS: Tremor. Memory loss. Knee pain. Physical Exam:      BP (!) 160/100   Pulse 79   Ht 5' 2\" (1.575 m)   Wt 159 lb (72.1 kg)   SpO2 98%   BMI 29.08 kg/m²   Estimated body mass index is 29.08 kg/m² as calculated from the following:    Height as of this encounter: 5' 2\" (1.575 m). Weight as of this encounter: 159 lb (72.1 kg). General:  Bari Lopez is a 57y.o. year old female who appears her stated age. HEENT: Normocephalic atraumatic. Neck supple. Chest: regular rate; pulses equal. Equal chest rise and fall  Abdomen: Soft nondistended.    Ext: DP equal with good capillary refill  Neuro    Mentation  Appropriate affect   oriented    Cranial Nerves:   Pupils equal and reactive to light  Extraocular motion intact  Face symmetric  No dysarthria  v1-3 sensation symmetric, masseter tone symmetric  Hearing symmetric and intact to finger rub    Sensation:   Intact    Motor  L deltoid 5/5; R deltoid 5/5  L biceps 5/5; R biceps 5/5  L triceps 5/5; R triceps 5/5  L wrist extension 5/5; R wrist extension 5/5  L intrinsics 5/5; R intrinsics 5/5     L iliopsoas 5/5 , R iliopsoas 5/5  L quadriceps 5/5; R quadriceps 5/5  L Dorsiflexion 5/5; R dorsiflexion 5/5  L Plantarflexion 5/5; R plantarflexion 5/5  L EHL 5/5; R EHL 5/5    Reflexes  L Brachioradialis 2+/4; R brachioradialis 2+/4  L Biceps 2+/4; R Biceps 2+/4  L Triceps 2+/4; R Triceps 2+/4  L Patellar 2+/4: R Patellar 2+/4  L Achilles 2+/4; R Achilles 2+/4    hoffmans L: neg  hoffmans R: neg  Clonus L: neg  Clonus R: neg  Babinski L: up  Babinski R; up    Noted resting tremor that appears to worsen with movement. Studies Review:     MRI of the brain with small 1.5 x 1.5 cm left periventricular cavernoma without acute hemorrhage. Assessment and Plan:      1. Cavernoma    2. Tremor    3. Memory disturbance          Plan: Stable cavernoma with single episode of hemorrhage not requiring treatment at this time. We will follow with serial imaging. Plan for repeat MRI brain with and without contrast in 1 year. We will also have her see neurology to follow-up for tremor and memory issues    Followup: No follow-ups on file. Prescriptions Ordered:  No orders of the defined types were placed in this encounter.        Orders Placed:  Orders Placed This Encounter   Procedures    Hina Freed MD, Neurology, [facility]     Referral Priority:   Routine     Referral Type:   Eval and Treat     Referral Reason:   Specialty Services Required     Referred to Provider:   Iesha Burnett MD     Requested Specialty:   Neurology     Number of Visits Requested:   1        Electronically signed by Jerry Buchanan DO on 1/23/2023 at 8:19 AM    Please note that this chart was generated using voice recognition Dragon dictation software. Although every effort was made to ensure the accuracy of this automated transcription, some errors in transcription may have occurred. PROVIDER:[TOKEN:[[id number]],FOLLOWUP:[1 week]] PROVIDER:[TOKEN:[[id number]],FOLLOWUP:[1 week]],PROVIDER:[TOKEN:[[id number]],FOLLOWUP:[2 weeks]]

## 2024-04-12 NOTE — DISCHARGE NOTE PROVIDER - NSDCHC_MEDRECSTATUS_GEN_ALL_CORE

## 2024-04-12 NOTE — PROGRESS NOTE ADULT - SUBJECTIVE AND OBJECTIVE BOX
DATE OF SERVICE: 04-12-24 @ 16:45    Patient is a 83y old  Female who presents with a chief complaint of Intramuscular hematoma (12 Apr 2024 15:21)      INTERVAL HISTORY: In no acute distress.     REVIEW OF SYSTEMS:  CONSTITUTIONAL: No weakness  EYES/ENT: No visual changes;  No throat pain   NECK: No pain or stiffness  RESPIRATORY: No cough, wheezing; No shortness of breath  CARDIOVASCULAR: No chest pain or palpitations  GASTROINTESTINAL: No abdominal  pain. No nausea, vomiting, or hematemesis  GENITOURINARY: No dysuria, frequency or hematuria  NEUROLOGICAL: No stroke like symptoms  SKIN: No rashes    TELEMETRY Personally reviewed: SR  with frequent PACs  	  MEDICATIONS:  doxazosin 2 milliGRAM(s) Oral at bedtime  metoprolol tartrate 12.5 milliGRAM(s) Oral two times a day        PHYSICAL EXAM:  T(C): 36.2 (04-12-24 @ 11:29), Max: 36.8 (04-11-24 @ 18:25)  HR: 82 (04-12-24 @ 11:29) (82 - 98)  BP: 128/68 (04-12-24 @ 11:29) (101/62 - 141/79)  RR: 18 (04-12-24 @ 11:29) (18 - 18)  SpO2: 93% (04-12-24 @ 11:29) (93% - 98%)  Wt(kg): --  I&O's Summary    11 Apr 2024 07:01  -  12 Apr 2024 07:00  --------------------------------------------------------  IN: 840 mL / OUT: 400 mL / NET: 440 mL    12 Apr 2024 07:01  -  12 Apr 2024 16:45  --------------------------------------------------------  IN: 200 mL / OUT: 300 mL / NET: -100 mL          Appearance: In no distress	  HEENT:    PERRL, EOMI	  Cardiovascular:  S1 S2, No JVD  Respiratory: Lungs clear to auscultation	  Gastrointestinal:  Soft, Non-tender, + BS	  Vascularature:  No edema of LE  Psychiatric: Appropriate affect   Neuro: no acute focal deficits                               9.1    7.30  )-----------( 347      ( 12 Apr 2024 06:57 )             30.3     04-11    134<L>  |  101  |  6<L>  ----------------------------<  74  3.3<L>   |  25  |  <0.30<L>    Ca    7.6<L>      11 Apr 2024 06:54          Labs personally reviewed      ASSESSMENT/PLAN: 	    82 y/o female w/ PMHx CAD s/p PCI, 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 CT showing R peroneal/buttock edema with heterogeneity and asymmetric enlargement of the right hip abductor muscles, extending to the level of the distal femoral diaphysis suspicious for IM hematoma  Admitted for Hb monitoring complicated with sepsis 2/2 GNR bacteremia of unclear source vs containmation      Problem/Plan - 1:  ·  Problem: Atrial fibrillation.   ·  Plan:  Not a candidate to start AC 2/2 blood loss 2/2 hematoma though CHADsVASc score of 4  - was NSR 70-80s but afib 4/10.     - Metoprolol 12.5mg BID for rate control    Problem/Plan - 2:  ·  Problem: CAD    ·  Plan: - On Brilinta for stent placed 4 years ago, hold for now given hematoma. hgb stable  - Cont with ASA 81mg.    Problem/Plan - 3:  ·  Problem: Diabetes mellitus.   ·  Plan: - Currently on Lantus 4u QHS + humalog ISS at home  - C/w Lantus 4u QHS + SAMUEL while inpatient.     Problem/Plan - 4:  ·  Problem: Prophylactic measure.   ·  Plan; DVT PPX with SCD           Mary Shaw, AG-NP   Stewart Coffey, DO [facility]  Cardiovascular Medicine  [address], [address]  Available through call or text on [employer] TEAMs  Office: [phone]   DATE OF SERVICE: 04-12-24 @ 16:45    Patient is a 83y old  Female who presents with a chief complaint of Intramuscular hematoma (12 Apr 2024 15:21)      INTERVAL HISTORY: In no acute distress.     REVIEW OF SYSTEMS:  CONSTITUTIONAL: No weakness  EYES/ENT: No visual changes;  No throat pain   NECK: No pain or stiffness  RESPIRATORY: No cough, wheezing; No shortness of breath  CARDIOVASCULAR: No chest pain or palpitations  GASTROINTESTINAL: No abdominal  pain. No nausea, vomiting, or hematemesis  GENITOURINARY: No dysuria, frequency or hematuria  NEUROLOGICAL: No stroke like symptoms  SKIN: No rashes    TELEMETRY Personally reviewed: SR  with frequent PACs  	  MEDICATIONS:  doxazosin 2 milliGRAM(s) Oral at bedtime  metoprolol tartrate 12.5 milliGRAM(s) Oral two times a day        PHYSICAL EXAM:  T(C): 36.2 (04-12-24 @ 11:29), Max: 36.8 (04-11-24 @ 18:25)  HR: 82 (04-12-24 @ 11:29) (82 - 98)  BP: 128/68 (04-12-24 @ 11:29) (101/62 - 141/79)  RR: 18 (04-12-24 @ 11:29) (18 - 18)  SpO2: 93% (04-12-24 @ 11:29) (93% - 98%)  Wt(kg): --  I&O's Summary    11 Apr 2024 07:01  -  12 Apr 2024 07:00  --------------------------------------------------------  IN: 840 mL / OUT: 400 mL / NET: 440 mL    12 Apr 2024 07:01  -  12 Apr 2024 16:45  --------------------------------------------------------  IN: 200 mL / OUT: 300 mL / NET: -100 mL          Appearance: In no distress	  HEENT:    PERRL, EOMI	  Cardiovascular:  S1 S2, No JVD  Respiratory: Lungs clear to auscultation	  Gastrointestinal:  Soft, Non-tender, + BS	  Vascularature:  No edema of LE  Psychiatric: Appropriate affect   Neuro: no acute focal deficits                               9.1    7.30  )-----------( 347      ( 12 Apr 2024 06:57 )             30.3     04-11    134<L>  |  101  |  6<L>  ----------------------------<  74  3.3<L>   |  25  |  <0.30<L>    Ca    7.6<L>      11 Apr 2024 06:54          Labs personally reviewed      ASSESSMENT/PLAN: 	    82 y/o female w/ PMHx CAD s/p PCI, 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 CT showing R peroneal/buttock edema with heterogeneity and asymmetric enlargement of the right hip abductor muscles, extending to the level of the distal femoral diaphysis suspicious for IM hematoma  Admitted for Hb monitoring complicated with sepsis 2/2 GNR bacteremia of unclear source vs containmation      Problem/Plan - 1:  ·  Problem: Atrial fibrillation.   ·  Plan:  Not a candidate to start AC 2/2 blood loss 2/2 hematoma though CHADsVASc score of 4  - was NSR 70-80s but afib 4/10.     - Metoprolol 12.5mg BID for rate control    Problem/Plan - 2:  ·  Problem: CAD    ·  Plan: - On Brilinta for stent placed 4 years ago, hold for now given hematoma. hgb stable  - Retstart ASA 81mg.    Problem/Plan - 3:  ·  Problem: Diabetes mellitus.   ·  Plan: - Currently on Lantus 4u QHS + humalog ISS at home  - C/w Lantus 4u QHS + SAMUEL while inpatient.     Problem/Plan - 4:  ·  Problem: Prophylactic measure.   ·  Plan; DVT PPX with SCD           Mary Shaw, AG-NP   Stewart Coffey,  [facility]  Cardiovascular Medicine  [address], [address]  Available through call or text on [employer] TEAMs  Office: [phone]

## 2024-04-12 NOTE — DISCHARGE NOTE PROVIDER - NSDCMRMEDTOKEN_GEN_ALL_CORE_FT
albuterol 1.25 mg/3 mL (0.042%) inhalation solution: 3 milliliter(s) by nebulizer once a day as needed for  shortness of breath and/or wheezing  ascorbic acid 100 mg oral tablet: orally every 48 hours  Brilinta (ticagrelor) 60 mg oral tablet: 1 tab(s) orally 2 times a day  calcium carbonate 1 g oral tablet: 1 tab(s) orally once a day  cholecalciferol 25 mcg (1000 intl units) oral tablet: 1 tab(s) orally once a day  Creon 24,000 units oral delayed release capsule: 1 cap(s) orally 2 times a day (before meals)  cyanocobalamin 1000 mcg oral tablet, extended release: 1 tab(s) orally once a day  Debrox Earwax Removal Kit 6.5% otic solution: 5 drop(s) in each affected ear as needed for earwax  famotidine 10 mg oral tablet: 1 tab(s) orally once a day  ferrous sulfate 325 mg (65 mg elemental iron) oral delayed release tablet: 1 tab(s) orally once a day  HumaLOG 100 units/mL injectable solution: injectable 3 times a day (before meals) Sliding scale  Lantus 100 units/mL subcutaneous solution: 4 unit(s) subcutaneous once a day (at bedtime)  methenamine hippurate 1 g oral tablet: 1 tab(s) orally once a day (at bedtime)  Mucinex: As needed for cough/congestion  Namenda 10 mg oral tablet: 1 tab(s) orally 2 times a day  NexIUM 24HR 20 mg oral delayed release tablet: 1 tab(s) orally once a day  nitroglycerin 0.4 mg sublingual tablet: 1 tab(s) sublingually as needed for  chest pain  QUEtiapine 25 mg oral tablet: 1 tab(s) orally 2 times a day  sertraline 25 mg oral tablet: 1 tab(s) orally once a day (at bedtime)   albuterol 1.25 mg/3 mL (0.042%) inhalation solution: 3 milliliter(s) by nebulizer once a day as needed for  shortness of breath and/or wheezing  ascorbic acid 100 mg oral tablet: orally every 48 hours  aspirin 81 mg oral delayed release tablet: 1 tab(s) orally once a day  cadexomer iodine 0.9% topical gel: Apply topically to affected area once a day 1 Apply topically to affected area R heel  once a day  calcium carbonate 1 g oral tablet: 1 tab(s) orally once a day  cholecalciferol 25 mcg (1000 intl units) oral tablet: 1 tab(s) orally once a day  Creon 24,000 units oral delayed release capsule: 1 cap(s) orally 2 times a day (before meals)  cyanocobalamin 1000 mcg oral tablet, extended release: 1 tab(s) orally once a day  doxazosin 2 mg oral tablet: 1 tab(s) orally once a day (at bedtime)  famotidine 10 mg oral tablet: 1 tab(s) orally once a day  ferrous sulfate 325 mg (65 mg elemental iron) oral delayed release tablet: 1 tab(s) orally once a day  HumaLOG 100 units/mL injectable solution: injectable 3 times a day (before meals) Sliding scale 1 Unit(s) if Glucose 151 - 200  2 Unit(s) if Glucose 201 - 250  3 Unit(s) if Glucose 251 - 300  4 Unit(s) if Glucose 301 - 350  5 Unit(s) if Glucose 351 - 400  6 Unit(s) if Glucose Greater Than 400  Lantus 100 units/mL subcutaneous solution: 4 unit(s) subcutaneous once a day (at bedtime)  methenamine hippurate 1 g oral tablet: 1 tab(s) orally once a day (at bedtime)  Metoprolol Tartrate 25 mg oral tablet: 0.5 tab(s) orally 2 times a day  Multiple Vitamins oral tablet: 1 tab(s) orally once a day  Namenda 10 mg oral tablet: 1 tab(s) orally 2 times a day  NexIUM 24HR 20 mg oral delayed release tablet: 1 tab(s) orally once a day  QUEtiapine 25 mg oral tablet: 1 tab(s) orally 2 times a day  sertraline 25 mg oral tablet: 1 tab(s) orally once a day (at bedtime)

## 2024-04-12 NOTE — DISCHARGE NOTE PROVIDER - NSDCCPCAREPLAN_GEN_ALL_CORE_FT
PRINCIPAL DISCHARGE DIAGNOSIS  Diagnosis: Ecchymosis  Assessment and Plan of Treatment:       SECONDARY DISCHARGE DIAGNOSES  Diagnosis: Sepsis due to Escherichia coli  Assessment and Plan of Treatment:     Diagnosis: Unspecified atrial fibrillation  Assessment and Plan of Treatment:     Diagnosis: Urinary retention  Assessment and Plan of Treatment:     Diagnosis: Dementia  Assessment and Plan of Treatment:     Diagnosis: Intramuscular hematoma  Assessment and Plan of Treatment:      PRINCIPAL DISCHARGE DIAGNOSIS  Diagnosis: Sepsis due to Escherichia coli  Assessment and Plan of Treatment: you developed bacteremia from EColi infection  you completed antibiotics      SECONDARY DISCHARGE DIAGNOSES  Diagnosis: Intramuscular hematoma  Assessment and Plan of Treatment: you developed hematoma of R hip muscles  you are no longer on Brilinta  your Hb remains stable    Diagnosis: Unspecified atrial fibrillation  Assessment and Plan of Treatment: continue aspirin and metoprolol  you are not a candidate for anticoagulation due to bleeding    Diagnosis: Urinary retention  Assessment and Plan of Treatment: continue cardura  you no longer require a licona catheter    Diagnosis: Dementia  Assessment and Plan of Treatment: Assist with ADLs  continue current medications

## 2024-04-12 NOTE — PROGRESS NOTE ADULT - SUBJECTIVE AND OBJECTIVE BOX
Follow Up:    Bacteremia    Interval History/ROS:  VSS ON. Patient was seen and examined at bedside. Confused. ROS unable to assess.    Allergies  No Known Allergies        ANTIMICROBIALS:  ertapenem  IVPB 1000 every 24 hours      OTHER MEDS:  MEDICATIONS  (STANDING):  acetaminophen     Tablet .. 650 every 6 hours PRN  albuterol/ipratropium for Nebulization 3 every 6 hours PRN  aspirin enteric coated 81 daily  dextrose 50% Injectable 12.5 once  dextrose 50% Injectable 25 once  dextrose Oral Gel 15 once PRN  doxazosin 2 at bedtime  famotidine    Tablet 20 daily  glucagon  Injectable 1 once  insulin glargine Injectable (LANTUS) 4 at bedtime  insulin lispro (ADMELOG) corrective regimen sliding scale  three times a day before meals  melatonin 3 at bedtime PRN  memantine 10 two times a day  metoprolol tartrate 12.5 two times a day  oxyCODONE    IR 2.5 every 4 hours PRN  oxyCODONE    IR 5 every 4 hours PRN  pancrelipase  (CREON 24,000 Lipase Units) 1 three times a day with meals  QUEtiapine 25 two times a day  sertraline 25 at bedtime      Vital Signs Last 24 Hrs  T(C): 36.2 (12 Apr 2024 11:29), Max: 36.8 (11 Apr 2024 18:25)  T(F): 97.2 (12 Apr 2024 11:29), Max: 98.2 (11 Apr 2024 18:25)  HR: 82 (12 Apr 2024 11:29) (82 - 98)  BP: 128/68 (12 Apr 2024 11:29) (97/53 - 141/79)  BP(mean): --  RR: 18 (12 Apr 2024 11:29) (18 - 18)  SpO2: 93% (12 Apr 2024 11:29) (93% - 99%)    Parameters below as of 12 Apr 2024 11:29  Patient On (Oxygen Delivery Method): room air      PHYSICAL EXAM:  Constitutional: NAD at rest  Cardiovascular:   normal S1, S2, no murmur, RRR  Respiratory:  clear BS bilaterally, no wheezes, no rales  GI:  soft, non-tender  :  no suprapubic tenderness; Connolly collecting clear urine  Neurologic: awake and oriented x0                              9.1    7.30  )-----------( 347      ( 12 Apr 2024 06:57 )             30.3       04-11    134<L>  |  101  |  6<L>  ----------------------------<  74  3.3<L>   |  25  |  <0.30<L>    Ca    7.6<L>      11 Apr 2024 06:54        Urinalysis Basic - ( 11 Apr 2024 06:54 )    Color: x / Appearance: x / SG: x / pH: x  Gluc: 74 mg/dL / Ketone: x  / Bili: x / Urobili: x   Blood: x / Protein: x / Nitrite: x   Leuk Esterase: x / RBC: x / WBC x   Sq Epi: x / Non Sq Epi: x / Bacteria: x        MICROBIOLOGY:  v  .Blood Blood-Peripheral  04-09-24   No growth at 72 Hours  --  --      .Blood Blood-Peripheral  04-09-24   No growth at 72 Hours  --  --      .Blood Blood-Venous  04-07-24   No growth at 4 days  --  Blood Culture PCR  Escherichia coli ESBL      Clean Catch Clean Catch (Midstream)  04-04-24   No growth  --  --      .Blood Blood-Peripheral  04-04-24   No growth at 5 days  --  --      .Blood Blood-Peripheral  04-04-24   No growth at 5 days  --  --                RADIOLOGY:  Imaging below independently reviewed.  < from: CT Abdomen and Pelvis w/ IV Cont (04.10.24 @ 21:27) >    ACC: [id number] EXAM:  CT ABDOMEN AND PELVIS IC   ORDERED BY: DAREN LEBLANC     PROCEDURE DATE:  04/10/2024          INTERPRETATION:  CLINICAL INFORMATION: Concern for right peroneal   hematoma. Downtrending hemoglobin.    COMPARISON: 4/4/2024    CONTRAST/COMPLICATIONS:  IV Contrast: Omnipaque 350  90 cc administered   10 cc discarded  Oral Contrast: NONE  Complications: None reported at time of study completion    PROCEDURE:  CT of the Abdomen and Pelvis was performed.  Sagittal and coronal reformats were performed.    FINDINGS:  LOWER CHEST: Trace pleural effusions. Passive atelectasis at the lung   bases.    LIVER: Within normal limits.  BILE DUCTS: Pneumobilia.  GALLBLADDER: Surgically removed.  SPLEEN: Fatty atrophy.  PANCREAS: Within normal limits.  ADRENALS: Within normal limits.  KIDNEYS/URETERS: Within normal limits.    BLADDER: Partially decompressed with a Connolly catheter.  REPRODUCTIVE ORGANS: Uterus and bilateral adnexa within normal limits.    BOWEL: No bowel obstruction. Distal gastrectomy and gastrojejunostomy.  PERITONEUM: No ascites.  VESSELS: Atherosclerotic calcifications.  RETROPERITONEUM/LYMPH NODES: No lymphadenopathy. No retroperitoneal   hematoma.  ABDOMINAL WALL: Redemonstrated hematoma in the right hip abductor  muscles, less prominent as compared to prior exam. No evidence of active   bleed. Probable sebaceous cyst overlying the spinous processes of the L2   and L3 vertebral bodies.  BONES: Degenerative changes. Compression deformities of the L1 and T11   vertebral bodies.    IMPRESSION:    Redemonstrated hematoma within the right hip abductor muscles without   evidence of active bleed.    Hematoma is less prominent as compared to prior exam. No retroperitoneal   hematoma.    --- End of Report ---           ALON ROLON MD; Resident Radiologist  This document has been electronically signed.  LAURA DURAND MD; Attending Radiologist  This document has been electronically signed. Apr 11 2024  8:35AM    < end of copied text >

## 2024-04-12 NOTE — PROGRESS NOTE ADULT - SUBJECTIVE AND OBJECTIVE BOX
Patient is a 83y old  Female who presents with a chief complaint of Intramuscular hematoma (12 Apr 2024 16:45)        SUBJECTIVE / OVERNIGHT EVENTS: Patient had no acute events overnight. Patient seen and examined at bedside this morning. Comfortable. Aox0    ROS: unable to particpate    MEDICATIONS  (STANDING):  ascorbic acid 500 milliGRAM(s) Oral <User Schedule>  aspirin enteric coated 81 milliGRAM(s) Oral daily  cadexomer iodine 0.9% Gel 1 Application(s) Topical daily  calcium carbonate   1250 mG (OsCal) 1 Tablet(s) Oral daily  chlorhexidine 2% Cloths 1 Application(s) Topical <User Schedule>  cholecalciferol 1000 Unit(s) Oral daily  cyanocobalamin 1000 MICROGram(s) Oral daily  dextrose 10% Bolus 125 milliLiter(s) IV Bolus once  dextrose 5%. 1000 milliLiter(s) (50 mL/Hr) IV Continuous <Continuous>  dextrose 5%. 1000 milliLiter(s) (100 mL/Hr) IV Continuous <Continuous>  dextrose 50% Injectable 12.5 Gram(s) IV Push once  dextrose 50% Injectable 25 Gram(s) IV Push once  doxazosin 2 milliGRAM(s) Oral at bedtime  ertapenem  IVPB 1000 milliGRAM(s) IV Intermittent every 24 hours  famotidine    Tablet 20 milliGRAM(s) Oral daily  ferrous    sulfate 325 milliGRAM(s) Oral daily  glucagon  Injectable 1 milliGRAM(s) IntraMuscular once  insulin glargine Injectable (LANTUS) 4 Unit(s) SubCutaneous at bedtime  insulin lispro (ADMELOG) corrective regimen sliding scale   SubCutaneous three times a day before meals  memantine 10 milliGRAM(s) Oral two times a day  metoprolol tartrate 12.5 milliGRAM(s) Oral two times a day  multivitamin 1 Tablet(s) Oral daily  mupirocin 2% Nasal 1 Application(s) Both Nostrils two times a day  pancrelipase  (CREON 24,000 Lipase Units) 1 Capsule(s) Oral three times a day with meals  QUEtiapine 25 milliGRAM(s) Oral two times a day  sertraline 25 milliGRAM(s) Oral at bedtime    MEDICATIONS  (PRN):  acetaminophen     Tablet .. 650 milliGRAM(s) Oral every 6 hours PRN Temp greater or equal to 38C (100.4F), Mild Pain (1 - 3)  albuterol/ipratropium for Nebulization 3 milliLiter(s) Nebulizer every 6 hours PRN Shortness of Breath and/or Wheezing  dextrose Oral Gel 15 Gram(s) Oral once PRN Blood Glucose LESS THAN 70 milliGRAM(s)/deciliter  melatonin 3 milliGRAM(s) Oral at bedtime PRN Insomnia      Vital Signs Last 24 Hrs  T(C): 36.6 (13 Apr 2024 04:53), Max: 36.9 (12 Apr 2024 20:40)  T(F): 97.8 (13 Apr 2024 04:53), Max: 98.5 (12 Apr 2024 20:40)  HR: 81 (13 Apr 2024 04:53) (68 - 82)  BP: 130/75 (13 Apr 2024 04:53) (119/73 - 130/75)  BP(mean): --  RR: 18 (13 Apr 2024 04:53) (18 - 18)  SpO2: 98% (13 Apr 2024 04:53) (93% - 98%)    Parameters below as of 13 Apr 2024 04:53  Patient On (Oxygen Delivery Method): room air      CAPILLARY BLOOD GLUCOSE      POCT Blood Glucose.: 120 mg/dL (13 Apr 2024 09:06)  POCT Blood Glucose.: 209 mg/dL (12 Apr 2024 21:03)  POCT Blood Glucose.: 209 mg/dL (12 Apr 2024 17:11)  POCT Blood Glucose.: 110 mg/dL (12 Apr 2024 12:35)    I&O's Summary    12 Apr 2024 07:01  -  13 Apr 2024 07:00  --------------------------------------------------------  IN: 300 mL / OUT: 450 mL / NET: -150 mL        PHYSICAL EXAM  GENERAL: NAD, lying comfortably in bed   HEENT:  Atraumatic, Normocephalic, EOMI, conjunctiva and sclera clear, no LAD  CHEST/LUNG: Clear to auscultation bilaterally; No wheeze. no wob  HEART: RRR, S1 and S2 No murmurs, rubs, or gallops  ABDOMEN: Soft, Nontender, Nondistended; Bowel sounds present. Connolly  EXTREMITIES:  legs bent, bruising on right inner thigh, right labial fold and right lower abdomen. No erythema or discharge  NEURO: AAOx0-1, non-focal  SKIN: No additional rashes or lesions    LABS:                        9.1    7.30  )-----------( 347      ( 12 Apr 2024 06:57 )             30.3                       RADIOLOGY & ADDITIONAL TESTS:      Labs Personally Reviewed  Imaging Personally Reviewed  Consultant(s) Notes Reviewed

## 2024-04-13 LAB
GLUCOSE BLDC GLUCOMTR-MCNC: 117 MG/DL — HIGH (ref 70–99)
GLUCOSE BLDC GLUCOMTR-MCNC: 120 MG/DL — HIGH (ref 70–99)
GLUCOSE BLDC GLUCOMTR-MCNC: 145 MG/DL — HIGH (ref 70–99)
GLUCOSE BLDC GLUCOMTR-MCNC: 175 MG/DL — HIGH (ref 70–99)
HCT VFR BLD CALC: 30.4 % — LOW (ref 34.5–45)
HGB BLD-MCNC: 9.7 G/DL — LOW (ref 11.5–15.5)
MCHC RBC-ENTMCNC: 28.7 PG — SIGNIFICANT CHANGE UP (ref 27–34)
MCHC RBC-ENTMCNC: 31.9 GM/DL — LOW (ref 32–36)
MCV RBC AUTO: 89.9 FL — SIGNIFICANT CHANGE UP (ref 80–100)
NRBC # BLD: 0 /100 WBCS — SIGNIFICANT CHANGE UP (ref 0–0)
PLATELET # BLD AUTO: 323 K/UL — SIGNIFICANT CHANGE UP (ref 150–400)
RBC # BLD: 3.38 M/UL — LOW (ref 3.8–5.2)
RBC # FLD: 16.7 % — HIGH (ref 10.3–14.5)
WBC # BLD: 6.13 K/UL — SIGNIFICANT CHANGE UP (ref 3.8–10.5)
WBC # FLD AUTO: 6.13 K/UL — SIGNIFICANT CHANGE UP (ref 3.8–10.5)

## 2024-04-13 PROCEDURE — 99232 SBSQ HOSP IP/OBS MODERATE 35: CPT

## 2024-04-13 RX ADMIN — FAMOTIDINE 20 MILLIGRAM(S): 10 INJECTION INTRAVENOUS at 11:35

## 2024-04-13 RX ADMIN — Medication 650 MILLIGRAM(S): at 11:34

## 2024-04-13 RX ADMIN — Medication 1 TABLET(S): at 11:35

## 2024-04-13 RX ADMIN — Medication 1 APPLICATION(S): at 11:36

## 2024-04-13 RX ADMIN — Medication 500 MILLIGRAM(S): at 09:34

## 2024-04-13 RX ADMIN — Medication 1 CAPSULE(S): at 13:12

## 2024-04-13 RX ADMIN — Medication 12.5 MILLIGRAM(S): at 05:10

## 2024-04-13 RX ADMIN — Medication 650 MILLIGRAM(S): at 12:03

## 2024-04-13 RX ADMIN — Medication 1 CAPSULE(S): at 08:12

## 2024-04-13 RX ADMIN — PREGABALIN 1000 MICROGRAM(S): 225 CAPSULE ORAL at 11:36

## 2024-04-13 RX ADMIN — Medication 1 TABLET(S): at 11:36

## 2024-04-13 RX ADMIN — QUETIAPINE FUMARATE 25 MILLIGRAM(S): 200 TABLET, FILM COATED ORAL at 17:45

## 2024-04-13 RX ADMIN — MUPIROCIN 1 APPLICATION(S): 20 OINTMENT TOPICAL at 05:12

## 2024-04-13 RX ADMIN — MEMANTINE HYDROCHLORIDE 10 MILLIGRAM(S): 10 TABLET ORAL at 17:45

## 2024-04-13 RX ADMIN — Medication 81 MILLIGRAM(S): at 11:34

## 2024-04-13 RX ADMIN — Medication 1: at 13:12

## 2024-04-13 RX ADMIN — CHLORHEXIDINE GLUCONATE 1 APPLICATION(S): 213 SOLUTION TOPICAL at 08:12

## 2024-04-13 RX ADMIN — Medication 325 MILLIGRAM(S): at 11:35

## 2024-04-13 RX ADMIN — SERTRALINE 25 MILLIGRAM(S): 25 TABLET, FILM COATED ORAL at 21:39

## 2024-04-13 RX ADMIN — MUPIROCIN 1 APPLICATION(S): 20 OINTMENT TOPICAL at 17:44

## 2024-04-13 RX ADMIN — QUETIAPINE FUMARATE 25 MILLIGRAM(S): 200 TABLET, FILM COATED ORAL at 05:10

## 2024-04-13 RX ADMIN — ERTAPENEM SODIUM 120 MILLIGRAM(S): 1 INJECTION, POWDER, LYOPHILIZED, FOR SOLUTION INTRAMUSCULAR; INTRAVENOUS at 08:11

## 2024-04-13 RX ADMIN — INSULIN GLARGINE 4 UNIT(S): 100 INJECTION, SOLUTION SUBCUTANEOUS at 21:59

## 2024-04-13 RX ADMIN — Medication 1 CAPSULE(S): at 17:46

## 2024-04-13 RX ADMIN — Medication 2 MILLIGRAM(S): at 21:39

## 2024-04-13 RX ADMIN — Medication 1000 UNIT(S): at 11:35

## 2024-04-13 RX ADMIN — MEMANTINE HYDROCHLORIDE 10 MILLIGRAM(S): 10 TABLET ORAL at 05:10

## 2024-04-13 RX ADMIN — Medication 12.5 MILLIGRAM(S): at 17:45

## 2024-04-13 NOTE — PROVIDER CONTACT NOTE (OTHER) - SITUATION
Bumps in vaginal wall
No AM labs ordered
Tele notified that pt switching back and forth between NSR and Afib
PAT for 2.17 sec

## 2024-04-13 NOTE — PROVIDER CONTACT NOTE (OTHER) - REASON
PAT for 2.17 sec 
Pt switching between NSR and Afib
Bumps in vaginal wall
Please see your pediatrician in 1-2 days.
No AM labs ordered
- Follow-up with your pediatrician within 48 hours of discharge.     Routine Home Care Instructions:  - Please call us for help if you feel sad, blue or overwhelmed for more than a few days after discharge  - Umbilical cord care:        - Please keep your baby's cord clean and dry (do not apply alcohol)        - Please keep your baby's diaper below the umbilical cord until it has fallen off (~10-14 days)        - Please do not submerge your baby in a bath until the cord has fallen off (sponge bath instead)    - Continue feeding child at least every 3 hours, wake baby to feed if needed.     Please contact your pediatrician and return to the hospital if you notice any of the following:   - Fever  (T > 100.4)  - Reduced amount of wet diapers (< 5-6 per day) or no wet diaper in 12 hours  - Increased fussiness, irritability, or crying inconsolably  - Lethargy (excessively sleepy, difficult to arouse)  - Breathing difficulties (noisy breathing, breathing fast, using belly and neck muscles to breath)  - Changes in the baby’s color (yellow, blue, pale, gray)  - Seizure or loss of consciousness
Please make an appointment to follow up with your pediatrician for 1-2 days after discharge.

## 2024-04-13 NOTE — PROVIDER CONTACT NOTE (OTHER) - ASSESSMENT
AOx1 pt is confused to where they are and who they are. VVS /77 with HR 82. No signs of chest pain or shortness of breath.
Pt AO 0-1 (baseline on admission), VSS, on RA. Pt has no signs of any CP, SOB, or discomforts.
AOx1 . No signs of chest pain or shortness of breath. VVS .
Patient is A&Ox0-1, no changes in baseline, no distress, no dizziness, VSS, see in flow sheets.

## 2024-04-13 NOTE — PROGRESS NOTE ADULT - SUBJECTIVE AND OBJECTIVE BOX
DATE OF SERVICE: 04-13-24 @ 11:41    Patient is a 83y old  Female who presents with a chief complaint of Intramuscular hematoma (12 Apr 2024 18:32)      INTERVAL HISTORY: no complaints     REVIEW OF SYSTEMS:  CONSTITUTIONAL: No weakness  EYES/ENT: No visual changes;  No throat pain   NECK: No pain or stiffness  RESPIRATORY: No cough, wheezing; No shortness of breath  CARDIOVASCULAR: No chest pain or palpitations  GASTROINTESTINAL: No abdominal  pain. No nausea, vomiting, or hematemesis  GENITOURINARY: No dysuria, frequency or hematuria  NEUROLOGICAL: No stroke like symptoms  SKIN: No rashes      	  MEDICATIONS:  doxazosin 2 milliGRAM(s) Oral at bedtime  metoprolol tartrate 12.5 milliGRAM(s) Oral two times a day        PHYSICAL EXAM:  T(C): 36.6 (04-13-24 @ 04:53), Max: 36.9 (04-12-24 @ 20:40)  HR: 81 (04-13-24 @ 04:53) (68 - 81)  BP: 130/75 (04-13-24 @ 04:53) (119/73 - 130/75)  RR: 18 (04-13-24 @ 04:53) (18 - 18)  SpO2: 98% (04-13-24 @ 04:53) (98% - 98%)  Wt(kg): --  I&O's Summary    12 Apr 2024 07:01  -  13 Apr 2024 07:00  --------------------------------------------------------  IN: 300 mL / OUT: 450 mL / NET: -150 mL          Appearance: In no distress	  HEENT:    PERRL, EOMI	  Cardiovascular:  S1 S2, No JVD  Respiratory: Lungs clear to auscultation	  Gastrointestinal:  Soft, Non-tender, + BS	  Vascularature:  No edema of LE  Psychiatric: Appropriate affect   Neuro: no acute focal deficits                               9.1    7.30  )-----------( 347      ( 12 Apr 2024 06:57 )             30.3               Labs personally reviewed      ASSESSMENT/PLAN: 	      84 y/o female w/ PMHx CAD s/p PCI, 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 CT showing R peroneal/buttock edema with heterogeneity and asymmetric enlargement of the right hip abductor muscles, extending to the level of the distal femoral diaphysis suspicious for IM hematoma  Admitted for Hb monitoring complicated with sepsis 2/2 GNR bacteremia of unclear source vs containmation      Problem/Plan - 1:  ·  Problem: Atrial fibrillation.   ·  Plan:  Not a candidate to start AC 2/2 blood loss 2/2 hematoma though CHADsVASc score of 4  - was NSR 70-80s but afib 4/10.     - Metoprolol 12.5mg BID for rate control    Problem/Plan - 2:  ·  Problem: CAD    ·  Plan: - On Brilinta for stent placed 4 years ago, hold for now given hematoma. hgb stable  - Retstart ASA 81mg.    Problem/Plan - 3:  ·  Problem: Diabetes mellitus.   ·  Plan: - Currently on Lantus 4u QHS + humalog ISS at home  - C/w Lantus 4u QHS + SAMUEL while inpatient.     Problem/Plan - 4:  ·  Problem: Prophylactic measure.   ·  Plan; DVT PPX with SCD               ESTEVAN Beatty DO [facility]  Cardiovascular Medicine  [address] [place] [address], [address]  Office: [phone]  Available via call/text on [employer] Teams

## 2024-04-13 NOTE — PROGRESS NOTE ADULT - SUBJECTIVE AND OBJECTIVE BOX
Christni Connell MD  Division of Hospital Medicine  Reachable on MS Teams    PROGRESS NOTE:     Patient is a 83y old  Female who presents with a chief complaint of Intramuscular hematoma (13 Apr 2024 11:40)      SUBJECTIVE / OVERNIGHT EVENTS: No acute events overnight, seen this AM. Resting in bed, no pain, confused.     ADDITIONAL REVIEW OF SYSTEMS:    MEDICATIONS  (STANDING):  ascorbic acid 500 milliGRAM(s) Oral <User Schedule>  aspirin enteric coated 81 milliGRAM(s) Oral daily  cadexomer iodine 0.9% Gel 1 Application(s) Topical daily  calcium carbonate   1250 mG (OsCal) 1 Tablet(s) Oral daily  chlorhexidine 2% Cloths 1 Application(s) Topical <User Schedule>  cholecalciferol 1000 Unit(s) Oral daily  cyanocobalamin 1000 MICROGram(s) Oral daily  dextrose 10% Bolus 125 milliLiter(s) IV Bolus once  dextrose 5%. 1000 milliLiter(s) (50 mL/Hr) IV Continuous <Continuous>  dextrose 5%. 1000 milliLiter(s) (100 mL/Hr) IV Continuous <Continuous>  dextrose 50% Injectable 12.5 Gram(s) IV Push once  dextrose 50% Injectable 25 Gram(s) IV Push once  doxazosin 2 milliGRAM(s) Oral at bedtime  ertapenem  IVPB 1000 milliGRAM(s) IV Intermittent every 24 hours  famotidine    Tablet 20 milliGRAM(s) Oral daily  ferrous    sulfate 325 milliGRAM(s) Oral daily  glucagon  Injectable 1 milliGRAM(s) IntraMuscular once  insulin glargine Injectable (LANTUS) 4 Unit(s) SubCutaneous at bedtime  insulin lispro (ADMELOG) corrective regimen sliding scale   SubCutaneous three times a day before meals  memantine 10 milliGRAM(s) Oral two times a day  metoprolol tartrate 12.5 milliGRAM(s) Oral two times a day  multivitamin 1 Tablet(s) Oral daily  mupirocin 2% Nasal 1 Application(s) Both Nostrils two times a day  pancrelipase  (CREON 24,000 Lipase Units) 1 Capsule(s) Oral three times a day with meals  QUEtiapine 25 milliGRAM(s) Oral two times a day  sertraline 25 milliGRAM(s) Oral at bedtime    MEDICATIONS  (PRN):  acetaminophen     Tablet .. 650 milliGRAM(s) Oral every 6 hours PRN Temp greater or equal to 38C (100.4F), Mild Pain (1 - 3)  albuterol/ipratropium for Nebulization 3 milliLiter(s) Nebulizer every 6 hours PRN Shortness of Breath and/or Wheezing  dextrose Oral Gel 15 Gram(s) Oral once PRN Blood Glucose LESS THAN 70 milliGRAM(s)/deciliter  melatonin 3 milliGRAM(s) Oral at bedtime PRN Insomnia      CAPILLARY BLOOD GLUCOSE      POCT Blood Glucose.: 175 mg/dL (13 Apr 2024 12:31)  POCT Blood Glucose.: 120 mg/dL (13 Apr 2024 09:06)  POCT Blood Glucose.: 209 mg/dL (12 Apr 2024 21:03)  POCT Blood Glucose.: 209 mg/dL (12 Apr 2024 17:11)    I&O's Summary    12 Apr 2024 07:01  -  13 Apr 2024 07:00  --------------------------------------------------------  IN: 300 mL / OUT: 450 mL / NET: -150 mL        PHYSICAL EXAM:  Vital Signs Last 24 Hrs  T(C): 36.5 (13 Apr 2024 13:22), Max: 36.9 (12 Apr 2024 20:40)  T(F): 97.7 (13 Apr 2024 13:22), Max: 98.5 (12 Apr 2024 20:40)  HR: 79 (13 Apr 2024 13:22) (68 - 81)  BP: 126/76 (13 Apr 2024 13:22) (119/73 - 130/75)  BP(mean): --  RR: 18 (13 Apr 2024 13:22) (18 - 18)  SpO2: 99% (13 Apr 2024 13:22) (98% - 99%)    Parameters below as of 13 Apr 2024 13:22  Patient On (Oxygen Delivery Method): room air      GENERAL: NAD, lying comfortably in bed   HEENT:  Atraumatic, Normocephalic, EOMI, conjunctiva and sclera clear, no LAD  CHEST/LUNG: Clear to auscultation bilaterally; No wheeze. no wob  HEART: RRR, S1 and S2 No murmurs, rubs, or gallops  ABDOMEN: Soft, Nontender, Nondistended; Bowel sounds present  EXTREMITIES:  legs bent, bruising on right inner thigh, right labial fold and right lower abdomen. No erythema or discharge  NEURO: AAOx0, non-focal  SKIN: No additional rashes or lesions    LABS:                        9.7    6.13  )-----------( 323      ( 13 Apr 2024 11:52 )             30.4                       RADIOLOGY & ADDITIONAL TESTS:  Results Reviewed:   Imaging Personally Reviewed:  Electrocardiogram Personally Reviewed:    COORDINATION OF CARE:  Care Discussed with Consultants/Other Providers [Y/N]:  Prior or Outpatient Records Reviewed [Y/N]:

## 2024-04-14 VITALS
OXYGEN SATURATION: 95 % | DIASTOLIC BLOOD PRESSURE: 63 MMHG | SYSTOLIC BLOOD PRESSURE: 147 MMHG | RESPIRATION RATE: 18 BRPM | TEMPERATURE: 98 F | HEART RATE: 90 BPM

## 2024-04-14 LAB
CULTURE RESULTS: SIGNIFICANT CHANGE UP
CULTURE RESULTS: SIGNIFICANT CHANGE UP
GLUCOSE BLDC GLUCOMTR-MCNC: 122 MG/DL — HIGH (ref 70–99)
GLUCOSE BLDC GLUCOMTR-MCNC: 90 MG/DL — SIGNIFICANT CHANGE UP (ref 70–99)
SPECIMEN SOURCE: SIGNIFICANT CHANGE UP
SPECIMEN SOURCE: SIGNIFICANT CHANGE UP

## 2024-04-14 PROCEDURE — 86901 BLOOD TYPING SEROLOGIC RH(D): CPT

## 2024-04-14 PROCEDURE — 71045 X-RAY EXAM CHEST 1 VIEW: CPT

## 2024-04-14 PROCEDURE — 83605 ASSAY OF LACTIC ACID: CPT

## 2024-04-14 PROCEDURE — 85018 HEMOGLOBIN: CPT

## 2024-04-14 PROCEDURE — 82962 GLUCOSE BLOOD TEST: CPT

## 2024-04-14 PROCEDURE — 83735 ASSAY OF MAGNESIUM: CPT

## 2024-04-14 PROCEDURE — 82330 ASSAY OF CALCIUM: CPT

## 2024-04-14 PROCEDURE — 82803 BLOOD GASES ANY COMBINATION: CPT

## 2024-04-14 PROCEDURE — 87077 CULTURE AEROBIC IDENTIFY: CPT

## 2024-04-14 PROCEDURE — 93005 ELECTROCARDIOGRAM TRACING: CPT

## 2024-04-14 PROCEDURE — 81003 URINALYSIS AUTO W/O SCOPE: CPT

## 2024-04-14 PROCEDURE — 80053 COMPREHEN METABOLIC PANEL: CPT

## 2024-04-14 PROCEDURE — 87086 URINE CULTURE/COLONY COUNT: CPT

## 2024-04-14 PROCEDURE — 85014 HEMATOCRIT: CPT

## 2024-04-14 PROCEDURE — 87186 SC STD MICRODIL/AGAR DIL: CPT

## 2024-04-14 PROCEDURE — 36415 COLL VENOUS BLD VENIPUNCTURE: CPT

## 2024-04-14 PROCEDURE — 85027 COMPLETE CBC AUTOMATED: CPT

## 2024-04-14 PROCEDURE — 87150 DNA/RNA AMPLIFIED PROBE: CPT

## 2024-04-14 PROCEDURE — 71275 CT ANGIOGRAPHY CHEST: CPT | Mod: MC

## 2024-04-14 PROCEDURE — 81001 URINALYSIS AUTO W/SCOPE: CPT

## 2024-04-14 PROCEDURE — 83036 HEMOGLOBIN GLYCOSYLATED A1C: CPT

## 2024-04-14 PROCEDURE — 85730 THROMBOPLASTIN TIME PARTIAL: CPT

## 2024-04-14 PROCEDURE — 86900 BLOOD TYPING SEROLOGIC ABO: CPT

## 2024-04-14 PROCEDURE — 85025 COMPLETE CBC W/AUTO DIFF WBC: CPT

## 2024-04-14 PROCEDURE — 99239 HOSP IP/OBS DSCHRG MGMT >30: CPT

## 2024-04-14 PROCEDURE — 84295 ASSAY OF SERUM SODIUM: CPT

## 2024-04-14 PROCEDURE — 99285 EMERGENCY DEPT VISIT HI MDM: CPT

## 2024-04-14 PROCEDURE — 97161 PT EVAL LOW COMPLEX 20 MIN: CPT

## 2024-04-14 PROCEDURE — 87040 BLOOD CULTURE FOR BACTERIA: CPT

## 2024-04-14 PROCEDURE — 85610 PROTHROMBIN TIME: CPT

## 2024-04-14 PROCEDURE — 84132 ASSAY OF SERUM POTASSIUM: CPT

## 2024-04-14 PROCEDURE — 84484 ASSAY OF TROPONIN QUANT: CPT

## 2024-04-14 PROCEDURE — 86850 RBC ANTIBODY SCREEN: CPT

## 2024-04-14 PROCEDURE — 87641 MR-STAPH DNA AMP PROBE: CPT

## 2024-04-14 PROCEDURE — 99232 SBSQ HOSP IP/OBS MODERATE 35: CPT

## 2024-04-14 PROCEDURE — 96374 THER/PROPH/DIAG INJ IV PUSH: CPT

## 2024-04-14 PROCEDURE — 82947 ASSAY GLUCOSE BLOOD QUANT: CPT

## 2024-04-14 PROCEDURE — 80048 BASIC METABOLIC PNL TOTAL CA: CPT

## 2024-04-14 PROCEDURE — 74177 CT ABD & PELVIS W/CONTRAST: CPT | Mod: MC

## 2024-04-14 PROCEDURE — 82435 ASSAY OF BLOOD CHLORIDE: CPT

## 2024-04-14 PROCEDURE — 70450 CT HEAD/BRAIN W/O DYE: CPT | Mod: MC

## 2024-04-14 PROCEDURE — 96375 TX/PRO/DX INJ NEW DRUG ADDON: CPT

## 2024-04-14 PROCEDURE — 87640 STAPH A DNA AMP PROBE: CPT

## 2024-04-14 RX ORDER — ASPIRIN/CALCIUM CARB/MAGNESIUM 324 MG
1 TABLET ORAL
Qty: 0 | Refills: 0 | DISCHARGE
Start: 2024-04-14

## 2024-04-14 RX ORDER — CADEXOMER IODINE 0.9 %
1 PADS, MEDICATED (EA) TOPICAL
Qty: 1 | Refills: 0
Start: 2024-04-14 | End: 2024-05-13

## 2024-04-14 RX ORDER — METOPROLOL TARTRATE 50 MG
0.5 TABLET ORAL
Qty: 30 | Refills: 0
Start: 2024-04-14 | End: 2024-05-13

## 2024-04-14 RX ORDER — DOXAZOSIN MESYLATE 4 MG
1 TABLET ORAL
Qty: 30 | Refills: 0
Start: 2024-04-14 | End: 2024-05-13

## 2024-04-14 RX ADMIN — MUPIROCIN 1 APPLICATION(S): 20 OINTMENT TOPICAL at 06:07

## 2024-04-14 RX ADMIN — Medication 1 CAPSULE(S): at 08:53

## 2024-04-14 RX ADMIN — Medication 81 MILLIGRAM(S): at 12:21

## 2024-04-14 RX ADMIN — QUETIAPINE FUMARATE 25 MILLIGRAM(S): 200 TABLET, FILM COATED ORAL at 06:07

## 2024-04-14 RX ADMIN — Medication 325 MILLIGRAM(S): at 12:21

## 2024-04-14 RX ADMIN — MEMANTINE HYDROCHLORIDE 10 MILLIGRAM(S): 10 TABLET ORAL at 06:07

## 2024-04-14 RX ADMIN — CHLORHEXIDINE GLUCONATE 1 APPLICATION(S): 213 SOLUTION TOPICAL at 06:24

## 2024-04-14 RX ADMIN — PREGABALIN 1000 MICROGRAM(S): 225 CAPSULE ORAL at 12:21

## 2024-04-14 RX ADMIN — Medication 1 TABLET(S): at 12:21

## 2024-04-14 RX ADMIN — FAMOTIDINE 20 MILLIGRAM(S): 10 INJECTION INTRAVENOUS at 12:20

## 2024-04-14 RX ADMIN — Medication 1 APPLICATION(S): at 12:20

## 2024-04-14 RX ADMIN — ERTAPENEM SODIUM 120 MILLIGRAM(S): 1 INJECTION, POWDER, LYOPHILIZED, FOR SOLUTION INTRAMUSCULAR; INTRAVENOUS at 08:53

## 2024-04-14 RX ADMIN — Medication 12.5 MILLIGRAM(S): at 06:07

## 2024-04-14 RX ADMIN — Medication 1000 UNIT(S): at 12:21

## 2024-04-14 RX ADMIN — Medication 1 TABLET(S): at 12:22

## 2024-04-14 RX ADMIN — Medication 1 CAPSULE(S): at 12:20

## 2024-04-14 NOTE — PROGRESS NOTE ADULT - PROBLEM SELECTOR PROBLEM 8
GERD (gastroesophageal reflux disease)
GERD (gastroesophageal reflux disease)
H/O Whipple procedure
H/O Whipple procedure
Prophylactic measure
Prophylactic measure
H/O Whipple procedure

## 2024-04-14 NOTE — PROGRESS NOTE ADULT - PROBLEM SELECTOR PLAN 5
- Currently on Lantus 4u QHS + humalog ISS at home  - C/w Lantus 4u QHS + SAMUEL while inpatient
- AAO x 1-2 at baseline, currently AAOx1  - C/w Namenda 10mg BID, Seroquel 25mg BID, Sertraline 25mg daily
- On Brilinta for stent placed 4 years ago, hold for now given hematoma. hgb stable  - outpatient cardiologist Dr. Nirav Bell from [place]. Lost to follow up. Given her sent is old, can she be switched from Brilinta to ASA vs eliquis.
- Currently on Lantus 4u QHS + humalog ISS at home  - C/w Lantus 4u QHS + SAMUEL while inpatient
- On Brilinta for stent placed 4 years ago, hold for now given hematoma. hgb stable  - will attempt to reach out to her outpatient cardiologist. Given her sent is old, can she be switched from Brilinta to ASA
- On Brilinta for stent placed 4 years ago, hold for now given hematoma. hgb stable  - outpatient cardiologist Dr. Nirav Bell from [place]. Lost to follow up.   - added ASA, Hgb stable
- On Brilinta for stent placed 4 years ago, hold for now given hematoma. hgb stable  - outpatient cardiologist Dr. Nirav Bell from [place]. Lost to follow up. Given her sent is old, can she be switched from Brilinta to ASA vs eliquis.
- AAO x 1-2 at baseline, currently AAOx1  - C/w Namenda 10mg BID, Seroquel 25mg BID, Sertraline 25mg daily
- On Brilinta for stent placed 4 years ago, hold for now given hematoma. hgb stable  - outpatient cardiologist Dr. Nirav Bell from [place]. Lost to follow up.   - added ASA
- On Brilinta for stent placed 4 years ago, hold for now given hematoma. hgb stable  - outpatient cardiologist Dr. Nirav Bell from [place]. Lost to follow up.   - added ASA, Hgb stable

## 2024-04-14 NOTE — PROGRESS NOTE ADULT - PROBLEM SELECTOR PLAN 6
- Currently on Lantus 4u QHS + humalog ISS at home  - C/w Lantus 4u QHS + SAMUEL while inpatient
- Had pancreatic CA in the past, received Whipple's surgery 16 years ago, has associated fat malabsorption  - Creon capsule before meals
- AAO x 1-2 at baseline, currently AAOx1  - C/w Namenda 10mg BID, Seroquel 25mg BID, Sertraline 25mg daily
- AAO x 1-2 at baseline, currently AAOx1  - C/w Namenda 10mg BID, Seroquel 25mg BID, Sertraline 25mg daily
- Currently on Lantus 4u QHS + humalog ISS at home  - C/w Lantus 4u QHS + SAMUEL while inpatient
- Had pancreatic CA in the past, received Whipple's surgery 16 years ago, has associated fat malabsorption  - Creon capsule before meals
- Currently on Lantus 4u QHS + humalog ISS at home  - C/w Lantus 4u QHS + SAMUEL while inpatient

## 2024-04-14 NOTE — PROGRESS NOTE ADULT - SUBJECTIVE AND OBJECTIVE BOX
Follow Up:    Bacteremia    Interval History/ROS:  VSS ON. S/p 7-d ertapenem. Patient was seen and examined at bedside. Awake, calm. Minimally verbally responsive. ROS unable to assess.    Allergies  No Known Allergies        ANTIMICROBIALS:      OTHER MEDS:  MEDICATIONS  (STANDING):  acetaminophen     Tablet .. 650 every 6 hours PRN  albuterol/ipratropium for Nebulization 3 every 6 hours PRN  aspirin enteric coated 81 daily  dextrose 50% Injectable 12.5 once  dextrose 50% Injectable 25 once  dextrose Oral Gel 15 once PRN  doxazosin 2 at bedtime  famotidine    Tablet 20 daily  glucagon  Injectable 1 once  insulin glargine Injectable (LANTUS) 4 at bedtime  insulin lispro (ADMELOG) corrective regimen sliding scale  three times a day before meals  melatonin 3 at bedtime PRN  memantine 10 two times a day  metoprolol tartrate 12.5 two times a day  pancrelipase  (CREON 24,000 Lipase Units) 1 three times a day with meals  QUEtiapine 25 two times a day  sertraline 25 at bedtime      Vital Signs Last 24 Hrs  T(C): 36.6 (14 Apr 2024 04:58), Max: 36.6 (14 Apr 2024 04:58)  T(F): 97.8 (14 Apr 2024 04:58), Max: 97.8 (14 Apr 2024 04:58)  HR: 78 (14 Apr 2024 04:58) (69 - 79)  BP: 136/75 (14 Apr 2024 04:58) (120/73 - 136/75)  BP(mean): --  RR: 18 (14 Apr 2024 04:58) (17 - 18)  SpO2: 95% (14 Apr 2024 04:58) (95% - 99%)    Parameters below as of 14 Apr 2024 04:58  Patient On (Oxygen Delivery Method): room air    PHYSICAL EXAM:  Constitutional: NAD at rest  Cardiovascular:   normal S1, S2, no murmur, RRR  Respiratory:  clear BS bilaterally, no wheezes, no rales  GI:  soft, non-tender  :  no suprapubic tenderness  Neurologic: awake and oriented x0  Skin: pelvic bruise improved                                9.7    6.13  )-----------( 323      ( 13 Apr 2024 11:52 )             30.4                   MICROBIOLOGY:  v  .Blood Blood-Peripheral  04-09-24   No growth at 4 days  --  --      .Blood Blood-Peripheral  04-09-24   No growth at 4 days  --  --      .Blood Blood-Venous  04-07-24   No growth at 5 days  --  Blood Culture PCR  Escherichia coli ESBL      Clean Catch Clean Catch (Midstream)  04-04-24   No growth  --  --      .Blood Blood-Peripheral  04-04-24   No growth at 5 days  --  --      .Blood Blood-Peripheral  04-04-24   No growth at 5 days  --  --                RADIOLOGY:  Imaging below independently reviewed.  < from: CT Abdomen and Pelvis w/ IV Cont (04.10.24 @ 21:27) >    ACC: [id number] EXAM:  CT ABDOMEN AND PELVIS IC   ORDERED BY: DAREN LEBLANC     PROCEDURE DATE:  04/10/2024          INTERPRETATION:  CLINICAL INFORMATION: Concern for right peroneal   hematoma. Downtrending hemoglobin.    COMPARISON: 4/4/2024    CONTRAST/COMPLICATIONS:  IV Contrast: Omnipaque 350  90 cc administered   10 cc discarded  Oral Contrast: NONE  Complications: None reported at time of study completion    PROCEDURE:  CT of the Abdomen and Pelvis was performed.  Sagittal and coronal reformats were performed.    FINDINGS:  LOWER CHEST: Trace pleural effusions. Passive atelectasis at the lung   bases.    LIVER: Within normal limits.  BILE DUCTS: Pneumobilia.  GALLBLADDER: Surgically removed.  SPLEEN: Fatty atrophy.  PANCREAS: Within normal limits.  ADRENALS: Within normal limits.  KIDNEYS/URETERS: Within normal limits.    BLADDER: Partially decompressed with a Connolly catheter.  REPRODUCTIVE ORGANS: Uterus and bilateral adnexa within normal limits.    BOWEL: No bowel obstruction. Distal gastrectomy and gastrojejunostomy.  PERITONEUM: No ascites.  VESSELS: Atherosclerotic calcifications.  RETROPERITONEUM/LYMPH NODES: No lymphadenopathy. No retroperitoneal   hematoma.  ABDOMINAL WALL: Redemonstrated hematoma in the right hip abductor  muscles, less prominent as compared to prior exam. No evidence of active   bleed. Probable sebaceous cyst overlying the spinous processes of the L2   and L3 vertebral bodies.  BONES: Degenerative changes. Compression deformities of the L1 and T11   vertebral bodies.    IMPRESSION:    Redemonstrated hematoma within the right hip abductor muscles without   evidence of active bleed.    Hematoma is less prominent as compared to prior exam. No retroperitoneal   hematoma.    --- End of Report ---           ALON ROLON MD; Resident Radiologist  This document has been electronically signed.  LAURA DURAND MD; Attending Radiologist  This document has been electronically signed. Apr 11 2024  8:35AM    < end of copied text >

## 2024-04-14 NOTE — PROGRESS NOTE ADULT - PROVIDER SPECIALTY LIST ADULT
Cardiology
Cardiology
Infectious Disease
Infectious Disease
Surgery
Cardiology
Internal Medicine
Internal Medicine
Cardiology
Infectious Disease
Internal Medicine
Internal Medicine
Hospitalist
Internal Medicine
Hospitalist
Internal Medicine

## 2024-04-14 NOTE — PROGRESS NOTE ADULT - PROBLEM SELECTOR PLAN 8
- Had pancreatic CA in the past, received Whipple's surgery 16 years ago, has associated fat malabsorption  - Creon capsule before meals
C/w Vitamin B12, C, D, calcium, and iron tablets; c/w methenamine 1g daily for UTI prophylaxis  DVT PPx: SCDs  Code Status: CPR
- C/w Pepcid and pantoprazole (in place of home Nexium)
- C/w Pepcid and pantoprazole (in place of home Nexium)
- Had pancreatic CA in the past, received Whipple's surgery 16 years ago, has associated fat malabsorption  - Creon capsule before meals
C/w Vitamin B12, C, D, calcium, and iron tablets; c/w methenamine 1g daily for UTI prophylaxis  DVT PPx: SCDs  Code Status: CPR
- Had pancreatic CA in the past, received Whipple's surgery 16 years ago, has associated fat malabsorption  - Creon capsule before meals
- Had pancreatic CA in the past, received Whipple's surgery 16 years ago, has associated fat malabsorption  - Creon capsule before meals

## 2024-04-14 NOTE — PROGRESS NOTE ADULT - SUBJECTIVE AND OBJECTIVE BOX
DATE OF SERVICE: 04-14-24 @ 11:54    Patient is a 83y old  Female who presents with a chief complaint of Intramuscular hematoma (14 Apr 2024 10:46)      INTERVAL HISTORY: no complaints     REVIEW OF SYSTEMS:  CONSTITUTIONAL: No weakness  EYES/ENT: No visual changes;  No throat pain   NECK: No pain or stiffness  RESPIRATORY: No cough, wheezing; No shortness of breath  CARDIOVASCULAR: No chest pain or palpitations  GASTROINTESTINAL: No abdominal  pain. No nausea, vomiting, or hematemesis  GENITOURINARY: No dysuria, frequency or hematuria  NEUROLOGICAL: No stroke like symptoms  SKIN: No rashes    	  MEDICATIONS:  doxazosin 2 milliGRAM(s) Oral at bedtime  metoprolol tartrate 12.5 milliGRAM(s) Oral two times a day        PHYSICAL EXAM:  T(C): 36.6 (04-14-24 @ 04:58), Max: 36.6 (04-14-24 @ 04:58)  HR: 78 (04-14-24 @ 04:58) (69 - 79)  BP: 136/75 (04-14-24 @ 04:58) (120/73 - 136/75)  RR: 18 (04-14-24 @ 04:58) (17 - 18)  SpO2: 95% (04-14-24 @ 04:58) (95% - 99%)  Wt(kg): --  I&O's Summary    13 Apr 2024 07:01  -  14 Apr 2024 07:00  --------------------------------------------------------  IN: 580 mL / OUT: 550 mL / NET: 30 mL    14 Apr 2024 07:01  -  14 Apr 2024 11:54  --------------------------------------------------------  IN: 360 mL / OUT: 0 mL / NET: 360 mL          Appearance: In no distress	  HEENT:    PERRL, EOMI	  Cardiovascular:  S1 S2, No JVD  Respiratory: Lungs clear to auscultation	  Gastrointestinal:  Soft, Non-tender, + BS	  Vascularature:  No edema of LE  Psychiatric: Appropriate affect   Neuro: no acute focal deficits                               9.7    6.13  )-----------( 323      ( 13 Apr 2024 11:52 )             30.4               Labs personally reviewed      ASSESSMENT/PLAN: 	        82 y/o female w/ PMHx CAD s/p PCI, 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 CT showing R peroneal/buttock edema with heterogeneity and asymmetric enlargement of the right hip abductor muscles, extending to the level of the distal femoral diaphysis suspicious for IM hematoma  Admitted for Hb monitoring complicated with sepsis 2/2 GNR bacteremia of unclear source vs containmation      Problem/Plan - 1:  ·  Problem: Atrial fibrillation.   ·  Plan:  Not a candidate to start AC 2/2 blood loss 2/2 hematoma though CHADsVASc score of 4  - was NSR 70-80s but afib 4/10.     - Metoprolol 12.5mg BID for rate control    Problem/Plan - 2:  ·  Problem: CAD    ·  Plan: - On Brilinta for stent placed 4 years ago, hold for now given hematoma. hgb stable  - Restart ASA 81mg.    Problem/Plan - 3:  ·  Problem: Diabetes mellitus.   ·  Plan: - Currently on Lantus 4u QHS + humalog ISS at home  - C/w Lantus 4u QHS + SAMUEL while inpatient.     Problem/Plan - 4:  ·  Problem: Prophylactic measure.   ·  Plan; DVT PPX with SCD                   ESTEVAN Beatty DO [facility]  Cardiovascular Medicine  [address], [address]  Office: [phone]  Available via call/text on Microsoft Teams

## 2024-04-14 NOTE — PROGRESS NOTE ADULT - PROBLEM SELECTOR PROBLEM 6
H/O Whipple procedure
Diabetes mellitus
Dementia
Diabetes mellitus
Dementia
H/O Whipple procedure
Diabetes mellitus
Diabetes mellitus

## 2024-04-14 NOTE — PROGRESS NOTE ADULT - PROBLEM SELECTOR PLAN 7
- AAO x 1-2 at baseline, currently AAOx1  - C/w Namenda 10mg BID, Seroquel 25mg BID, Sertraline 25mg daily
- C/w Pepcid and pantoprazole (in place of home Nexium)
- AAO x 1-2 at baseline, currently AAOx1  - C/w Namenda 10mg BID, Seroquel 25mg BID, Sertraline 25mg daily
- Had pancreatic CA in the past, received Whipple's surgery 16 years ago, has associated fat malabsorption  - Creon capsule before meals
- C/w Pepcid and pantoprazole (in place of home Nexium)
- Had pancreatic CA in the past, received Whipple's surgery 16 years ago, has associated fat malabsorption  - Creon capsule before meals
- AAO x 1-2 at baseline, currently AAOx0  - C/w Namenda 10mg BID, Seroquel 25mg BID, Sertraline 25mg daily
- AAO x 1-2 at baseline, currently AAOx0  - C/w Namenda 10mg BID, Seroquel 25mg BID, Sertraline 25mg daily
- AAO x 1-2 at baseline, currently AAOx1  - C/w Namenda 10mg BID, Seroquel 25mg BID, Sertraline 25mg daily
- AAO x 1-2 at baseline, currently AAOx1  - C/w Namenda 10mg BID, Seroquel 25mg BID, Sertraline 25mg daily

## 2024-04-14 NOTE — PROGRESS NOTE ADULT - SUBJECTIVE AND OBJECTIVE BOX
Christin Connell MD  Division of Hospital Medicine  Reachable on MS Teams    PROGRESS NOTE:     Patient is a 83y old  Female who presents with a chief complaint of Intramuscular hematoma (14 Apr 2024 11:53)      SUBJECTIVE / OVERNIGHT EVENTS:    ADDITIONAL REVIEW OF SYSTEMS:    MEDICATIONS  (STANDING):  ascorbic acid 500 milliGRAM(s) Oral <User Schedule>  aspirin enteric coated 81 milliGRAM(s) Oral daily  cadexomer iodine 0.9% Gel 1 Application(s) Topical daily  calcium carbonate   1250 mG (OsCal) 1 Tablet(s) Oral daily  chlorhexidine 2% Cloths 1 Application(s) Topical <User Schedule>  cholecalciferol 1000 Unit(s) Oral daily  cyanocobalamin 1000 MICROGram(s) Oral daily  dextrose 10% Bolus 125 milliLiter(s) IV Bolus once  dextrose 5%. 1000 milliLiter(s) (50 mL/Hr) IV Continuous <Continuous>  dextrose 5%. 1000 milliLiter(s) (100 mL/Hr) IV Continuous <Continuous>  dextrose 50% Injectable 12.5 Gram(s) IV Push once  dextrose 50% Injectable 25 Gram(s) IV Push once  doxazosin 2 milliGRAM(s) Oral at bedtime  famotidine    Tablet 20 milliGRAM(s) Oral daily  ferrous    sulfate 325 milliGRAM(s) Oral daily  glucagon  Injectable 1 milliGRAM(s) IntraMuscular once  insulin glargine Injectable (LANTUS) 4 Unit(s) SubCutaneous at bedtime  insulin lispro (ADMELOG) corrective regimen sliding scale   SubCutaneous three times a day before meals  memantine 10 milliGRAM(s) Oral two times a day  metoprolol tartrate 12.5 milliGRAM(s) Oral two times a day  multivitamin 1 Tablet(s) Oral daily  mupirocin 2% Nasal 1 Application(s) Both Nostrils two times a day  pancrelipase  (CREON 24,000 Lipase Units) 1 Capsule(s) Oral three times a day with meals  QUEtiapine 25 milliGRAM(s) Oral two times a day  sertraline 25 milliGRAM(s) Oral at bedtime    MEDICATIONS  (PRN):  acetaminophen     Tablet .. 650 milliGRAM(s) Oral every 6 hours PRN Temp greater or equal to 38C (100.4F), Mild Pain (1 - 3)  albuterol/ipratropium for Nebulization 3 milliLiter(s) Nebulizer every 6 hours PRN Shortness of Breath and/or Wheezing  dextrose Oral Gel 15 Gram(s) Oral once PRN Blood Glucose LESS THAN 70 milliGRAM(s)/deciliter  melatonin 3 milliGRAM(s) Oral at bedtime PRN Insomnia      CAPILLARY BLOOD GLUCOSE      POCT Blood Glucose.: 122 mg/dL (14 Apr 2024 12:35)  POCT Blood Glucose.: 90 mg/dL (14 Apr 2024 08:59)  POCT Blood Glucose.: 145 mg/dL (13 Apr 2024 21:52)  POCT Blood Glucose.: 117 mg/dL (13 Apr 2024 17:46)    I&O's Summary    13 Apr 2024 07:01  -  14 Apr 2024 07:00  --------------------------------------------------------  IN: 580 mL / OUT: 550 mL / NET: 30 mL    14 Apr 2024 07:01  -  14 Apr 2024 17:36  --------------------------------------------------------  IN: 640 mL / OUT: 300 mL / NET: 340 mL        PHYSICAL EXAM:  Vital Signs Last 24 Hrs  T(C): 36.8 (14 Apr 2024 11:15), Max: 36.8 (14 Apr 2024 11:15)  T(F): 98.2 (14 Apr 2024 11:15), Max: 98.2 (14 Apr 2024 11:15)  HR: 90 (14 Apr 2024 11:15) (69 - 90)  BP: 147/63 (14 Apr 2024 11:15) (120/73 - 147/63)  BP(mean): --  RR: 18 (14 Apr 2024 11:15) (17 - 18)  SpO2: 95% (14 Apr 2024 11:15) (95% - 98%)    Parameters below as of 14 Apr 2024 11:15  Patient On (Oxygen Delivery Method): room air      GENERAL: NAD, lying comfortably in bed   HEENT:  Atraumatic, Normocephalic, EOMI, conjunctiva and sclera clear, no LAD  CHEST/LUNG: Clear to auscultation bilaterally; No wheeze. no wob  HEART: RRR, S1 and S2 No murmurs, rubs, or gallops  ABDOMEN: Soft, Nontender, Nondistended; Bowel sounds present  EXTREMITIES:  legs bent, bruising on right inner thigh, right labial fold and right lower abdomen. No erythema or discharge  NEURO: AAOx0, non-focal  SKIN: No additional rashes or lesions      LABS:                        9.7    6.13  )-----------( 323      ( 13 Apr 2024 11:52 )             30.4                       RADIOLOGY & ADDITIONAL TESTS:  Results Reviewed:   Imaging Personally Reviewed:  Electrocardiogram Personally Reviewed:    COORDINATION OF CARE:  Care Discussed with Consultants/Other Providers [Y/N]: ID  Prior or Outpatient Records Reviewed [Y/N]:   Straight, Heterosexual

## 2024-04-14 NOTE — PROGRESS NOTE ADULT - PROBLEM SELECTOR PLAN 9
- C/w Pepcid and pantoprazole (in place of home Nexium)
C/w Vitamin B12, C, D, calcium, and iron tablets; c/w methenamine 1g daily for UTI prophylaxis  DVT PPx: SCDs  Code Status: CPR
- C/w Pepcid and pantoprazole (in place of home Nexium)
C/w Vitamin B12, C, D, calcium, and iron tablets; c/w methenamine 1g daily for UTI prophylaxis  DVT PPx: SCDs  Code Status: CPR

## 2024-04-14 NOTE — PROGRESS NOTE ADULT - PROBLEM SELECTOR PLAN 4
- ECG showing atrial fibrillation with regular rate  - No prior history of atrial fibrillation per daughter, could be acute response in setting of  blood loss and pain 2/2 hematoma  - Monitor on telemetry- reoccurrence of afib 4/10  - CHADsVASc score of 4, but given new hematoma w/ Hb drop on just Brillinta, would not start full dose AC at the moment  -D/w daughter Shirley on risk and benefits of AC. Currently, patient has hematoma and easy bruising. She had hx of falls. Holding of full AC for now  - was NSR 70-80s but afib 4/10. Start metoprolol 12.5mg bid.    - cardiology consulted- d/w Dr Bull 4/12, will hold of brilinita and full AC (for afib) on discharge. Start 81mg ASA and monitor hgb
- Currently on Lantus 4u QHS + humalog ISS at home  - C/w Lantus 4u QHS + SAMUEL while inpatient
- On Brilinta for stent placed 4 years ago, hold for now given hematoma
- ECG showing atrial fibrillation with regular rate  - No prior history of atrial fibrillation per daughter, could be acute response in setting of  blood loss and pain 2/2 hematoma  - Monitor on telemetry  - CHADsVASc score of 4, but given new hematoma w/ Hb drop on just Brillinta, would not start FD AC at the moment  - on tele NSR 70-80s. IF afib reoccurs, will need-risk benefit discussion later in hospital course regarding full-dose AC if Afib is persisting
- Currently on Lantus 4u QHS + humalog ISS at home  - C/w Lantus 4u QHS + SAMUEL while inpatient
- ECG showing atrial fibrillation with regular rate  - No prior history of atrial fibrillation per daughter, could be acute response in setting of  blood loss and pain 2/2 hematoma  - Monitor on telemetry- reoccurrence of afib 4/10  - CHADsVASc score of 4, but given new hematoma w/ Hb drop on just Brillinta, would not start full dose AC at the moment  -D/w daughter Shirley on risk and benefits of AC. Currently, patient has hematoma and easy bruising. She had hx of falls. Holding of full AC for now  - was NSR 70-80s but afib 4/10. Start metoprolol 12.5mg bid.    - cardiology consulted
- ECG showing atrial fibrillation with regular rate  - No prior history of atrial fibrillation per daughter, could be acute response in setting of  blood loss and pain 2/2 hematoma  - Monitor on telemetry- reoccurrence of afib 4/10  - CHADsVASc score of 4, but given new hematoma w/ Hb drop on just Brillinta, would not start full dose AC at the moment  -D/w daughter Shirley on risk and benefits of AC. Currently, patient has hematoma and easy bruising. She had hx of falls. Holding of full AC for now  - was NSR 70-80s but afib 4/10. Start metoprolol 12.5mg bid.    - cardiology consulted- d/w Dr Coffey 4/12, will hold of brilinita and full AC (for afib) on discharge. Start 81mg ASA and monitor hgb- stable
- ECG showing atrial fibrillation with regular rate  - No prior history of atrial fibrillation per daughter, could be acute response in setting of  blood loss and pain 2/2 hematoma  - Monitor on telemetry- reoccurance of afib 4/10  - CHADsVASc score of 4, but given new hematoma w/ Hb drop on just Brillinta, would not start FD AC at the moment  - was NSR 70-80s but afib 4/10. Start metoprolol 12.5mg bid.    -D/w daughter Shirley on risk and benefits of AC. Currently, patient has hematoma and easy bruising. She had hx of falls. Holding of full AC for now  -cardiology consulted
- On Brilinta for stent placed 4 years ago, hold for now given hematoma
- ECG showing atrial fibrillation with regular rate  - No prior history of atrial fibrillation per daughter, could be acute response in setting of  blood loss and pain 2/2 hematoma  - Monitor on telemetry- reoccurrence of afib 4/10  - CHADsVASc score of 4, but given new hematoma w/ Hb drop on just Brillinta, would not start full dose AC at the moment  -D/w daughter Shirley on risk and benefits of AC. Currently, patient has hematoma and easy bruising. She had hx of falls. Holding of full AC for now  - was NSR 70-80s but afib 4/10. Start metoprolol 12.5mg bid.    - cardiology consulted- d/w Dr Coffey 4/12, will hold of brilinita and full AC (for afib) on discharge. Start 81mg ASA and monitor hgb- stable

## 2024-04-14 NOTE — PROGRESS NOTE ADULT - PROBLEM SELECTOR PLAN 10
C/w Vitamin B12, C, D, calcium, and iron tablets; c/w methenamine 1g daily for UTI prophylaxis  DVT PPx: SCDs  Code Status: CPR
C/w Vitamin B12, C, D, calcium, and iron tablets; c/w methenamine 1g daily for UTI prophylaxis  DVT PPx: SCDs  Code Status: FULL code due to Jainism reasons
C/w Vitamin B12, C, D, calcium, and iron tablets; c/w methenamine 1g daily for UTI prophylaxis  DVT PPx: SCDs  Code Status: FULL code due to Sikhism reasons  Plan for discharge on 4/14 after last dose of ertapenem
C/w Vitamin B12, C, D, calcium, and iron tablets; c/w methenamine 1g daily for UTI prophylaxis  DVT PPx: SCDs  Code Status: FULL code due to Congregation reasons
C/w Vitamin B12, C, D, calcium, and iron tablets; c/w methenamine 1g daily for UTI prophylaxis  DVT PPx: SCDs  Code Status: FULL code due to Tenriism reasons
C/w Vitamin B12, C, D, calcium, and iron tablets; c/w methenamine 1g daily for UTI prophylaxis  DVT PPx: SCDs  Code Status: FULL code due to Religion reasons  Plan for discharge on 4/14 after last dose of ertapenem

## 2024-04-14 NOTE — PROGRESS NOTE ADULT - PROBLEM SELECTOR PROBLEM 3
CAD (coronary artery disease)
Urinary retention
Urinary retention
Unspecified atrial fibrillation
Urinary retention
Unspecified atrial fibrillation
Urinary retention
Urinary retention
CAD (coronary artery disease)
Urinary retention
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
## 2024-04-14 NOTE — PROGRESS NOTE ADULT - PROBLEM SELECTOR PLAN 3
- ECG showing atrial fibrillation with regular rate  - No prior history of atrial fibrillation per daughter, could be acute response in setting of  blood loss and pain 2/2 hematoma  - Monitor on telemetry  - CHADsVASc score of 4, but given new hematoma w/ Hb drop on just Brillinta, would not start FD AC at the moment  - Will need-risk benefit discussion later in hospital course regarding full-dose AC if Afib is persisting  - on tele NSR 70-80s
-Bladder scan retaining >300 with straight cath output 600cc. Bladder scan q8hr. Connolly placed 4/7  -add flomax 0.4mg bedtime  -will need TOV when closer to dc
-Bladder scan retaining >300 with straight cath output 600cc. Bladder scan q8hr. Connolly placed 4/7  -Doxazosin 2mg bedtime  -will need TOV when closer to dc
- On Brilinta for stent placed 4 years ago, hold for now given hematoma
-Bladder scan retaining >300 with straight cath output 600cc. Bladder scan q8hr. Connolly placed 4/7 and removed for TOV 4/12  -Doxazosin 2mg bedtime
- ECG showing atrial fibrillation with regular rate  - No prior history of atrial fibrillation per daughter, could be acute response in setting of  blood loss and pain 2/2 hematoma  - Monitor on telemetry  - CHADsVASc score of 4, but given new hematoma w/ Hb drop on just Brillinta, would not start FD AC at the moment  - Will need-risk benefit discussion later in hospital course regarding full-dose AC if Afib is persisting  - on tele NSR 70-80s
-Bladder scan retaining >300 with straight cath output 600cc. Bladder scan q8hr. Connolly placed 4/7  -Doxazosin 2mg bedtime  -will need TOV when closer to dc
- On Brilinta for stent placed 4 years ago, hold for now given hematoma
-Bladder scan retaining >300 with straight cath output 600cc. Bladder scan q8hr. Connolly placed 4/7 and removed for TOV 4/12  -Doxazosin 2mg bedtime
-Bladder scan retaining >300 with straight cath output 600cc. Bladder scan q8hr. Connolly placed 4/7 and removed for TOV 4/12  -Doxazosin 2mg bedtime

## 2024-04-14 NOTE — PROGRESS NOTE ADULT - PROBLEM SELECTOR PROBLEM 2
Intramuscular hematoma
Unspecified atrial fibrillation
Unspecified atrial fibrillation
Intramuscular hematoma

## 2024-04-14 NOTE — PROGRESS NOTE ADULT - PROBLEM SELECTOR PROBLEM 9
Prophylactic measure
GERD (gastroesophageal reflux disease)
GERD (gastroesophageal reflux disease)
Prophylactic measure
GERD (gastroesophageal reflux disease)
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
## 2024-04-14 NOTE — PROGRESS NOTE ADULT - NSPROGADDITIONALINFOA_GEN_ALL_CORE
D/w with ID Dr. Canela. 7days of ertapenem.       The necessity of the time spent during the encounter on this date of service was due to:   - Ordering, reviewing, and interpreting labs, testing, and imaging.  - Independently obtaining a review of systems and performing a physical exam  - Reviewing prior hospitalization and where necessary, outpatient records.  - Counselling and educating patient and family regarding interpretation of aforementioned items and plan of care.    Time Spent 35 minutes    Patience Ambriz  Division of Hospital Medicine  Available on [employer] Teams
d/w ACP Silvia and ID, time discharge planning 32 minutes
Plan d/w DANA Carlos
Dispo planning.     The necessity of the time spent during the encounter on this date of service was due to:   - Ordering, reviewing, and interpreting labs, testing, and imaging.  - Independently obtaining a review of systems and performing a physical exam  - Reviewing prior hospitalization and where necessary, outpatient records.  - Counselling and educating patient and family regarding interpretation of aforementioned items and plan of care.    Time Spent 40 minutes    Patience Ambriz  Division of Hospital Medicine  Available on [employer] Teams
The necessity of the time spent during the encounter on this date of service was due to:   - Ordering, reviewing, and interpreting labs, testing, and imaging.  - Independently obtaining a review of systems and performing a physical exam  - Reviewing prior hospitalization and where necessary, outpatient records.  - Counselling and educating patient and family regarding interpretation of aforementioned items and plan of care.    Time Spent 35 minutes    Patience Ambriz  Division of Hospital Medicine  Available on Microsoft Teams
Undergoing SIRS workup. Rule out infection and PE      The necessity of the time spent during the encounter on this date of service was due to:   - Ordering, reviewing, and interpreting labs, testing, and imaging.  - Independently obtaining a review of systems and performing a physical exam  - Reviewing prior hospitalization and where necessary, outpatient records.  - Counselling and educating patient and family regarding interpretation of aforementioned items and plan of care.    Time Spent 35 minutes    Patience Ambriz  Division of Hospital Medicine  Available on Microsoft Teams
Updated daughter Shirley      The necessity of the time spent during the encounter on this date of service was due to:   - Ordering, reviewing, and interpreting labs, testing, and imaging.  - Independently obtaining a review of systems and performing a physical exam  - Reviewing prior hospitalization and where necessary, outpatient records.  - Counselling and educating patient and family regarding interpretation of aforementioned items and plan of care.    Time Spent 35 minutes    Patience Ambriz  Division of Hospital Medicine  Available on Microsoft Teams
Updated daughter.      The necessity of the time spent during the encounter on this date of service was due to:   - Ordering, reviewing, and interpreting labs, testing, and imaging.  - Independently obtaining a review of systems and performing a physical exam  - Reviewing prior hospitalization and where necessary, outpatient records.  - Counselling and educating patient and family regarding interpretation of aforementioned items and plan of care.    Time Spent 35 minutes    Patience Ambriz  Division of Hospital Medicine  Available on Microsoft Teams
Updated Shirley coronado. D/w cardiology Dr. Coffey. Dispo on Sunday      The necessity of the time spent during the encounter on this date of service was due to:   - Ordering, reviewing, and interpreting labs, testing, and imaging.  - Independently obtaining a review of systems and performing a physical exam  - Reviewing prior hospitalization and where necessary, outpatient records.  - Counselling and educating patient and family regarding interpretation of aforementioned items and plan of care.    Time Spent 35 minutes    Patience Ambriz  Division of Hospital Medicine  Available on [employer] Teams

## 2024-04-14 NOTE — PROGRESS NOTE ADULT - PROBLEM SELECTOR PROBLEM 5
Dementia
CAD (coronary artery disease)
CAD (coronary artery disease)
Diabetes mellitus
Diabetes mellitus
Dementia
CAD (coronary artery disease)

## 2024-04-14 NOTE — PROGRESS NOTE ADULT - PROBLEM SELECTOR PROBLEM 1
Sepsis due to Escherichia coli
Systemic inflammatory response syndrome (SIRS)
Sepsis due to Escherichia coli
Intramuscular hematoma
Systemic inflammatory response syndrome (SIRS)
Sepsis due to Escherichia coli
Systemic inflammatory response syndrome (SIRS)
Intramuscular hematoma
Sepsis due to Escherichia coli
Systemic inflammatory response syndrome (SIRS)

## 2024-04-14 NOTE — PROGRESS NOTE ADULT - PROBLEM SELECTOR PROBLEM 7
Dementia
Dementia
GERD (gastroesophageal reflux disease)
H/O Whipple procedure
Dementia
GERD (gastroesophageal reflux disease)
H/O Whipple procedure

## 2024-04-14 NOTE — PROGRESS NOTE ADULT - REASON FOR ADMISSION
Intramuscular hematoma

## 2024-04-14 NOTE — PROGRESS NOTE ADULT - PROBLEM SELECTOR PROBLEM 4
CAD (coronary artery disease)
Unspecified atrial fibrillation
CAD (coronary artery disease)
Diabetes mellitus
Unspecified atrial fibrillation
Unspecified atrial fibrillation
Diabetes mellitus
Unspecified atrial fibrillation
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
## 2024-04-18 ENCOUNTER — NON-APPOINTMENT (OUTPATIENT)
Age: 84
End: 2024-04-18

## 2024-05-08 ENCOUNTER — RX RENEWAL (OUTPATIENT)
Age: 84
End: 2024-05-08

## 2024-05-08 RX ORDER — INSULIN LISPRO 100 [IU]/ML
100 INJECTION, SOLUTION INTRAVENOUS; SUBCUTANEOUS
Qty: 1 | Refills: 2 | Status: ACTIVE | COMMUNITY
Start: 2024-01-09 | End: 1900-01-01

## 2024-06-10 RX ORDER — FERROUS SULFATE 325(65) MG
1 TABLET ORAL
Refills: 0 | DISCHARGE

## 2024-06-10 RX ORDER — INSULIN LISPRO 100/ML
0 VIAL (ML) SUBCUTANEOUS
Qty: 0 | Refills: 0 | DISCHARGE

## 2024-06-10 RX ORDER — TICAGRELOR 90 MG/1
1 TABLET ORAL
Refills: 0 | DISCHARGE

## 2024-06-10 RX ORDER — QUETIAPINE FUMARATE 200 MG/1
1 TABLET, FILM COATED ORAL
Refills: 0 | DISCHARGE

## 2024-06-10 RX ORDER — CARBAMIDE PEROXIDE 81.86 MG/ML
5 SOLUTION/ DROPS AURICULAR (OTIC)
Refills: 0 | DISCHARGE

## 2024-06-10 RX ORDER — CALCIUM CARBONATE 500(1250)
1 TABLET ORAL
Refills: 0 | DISCHARGE

## 2024-06-10 RX ORDER — NITROGLYCERIN 6.5 MG
1 CAPSULE, EXTENDED RELEASE ORAL
Refills: 0 | DISCHARGE

## 2024-06-10 RX ORDER — MEMANTINE HYDROCHLORIDE 10 MG/1
1 TABLET ORAL
Refills: 0 | DISCHARGE

## 2024-06-10 RX ORDER — CHOLECALCIFEROL (VITAMIN D3) 125 MCG
1 CAPSULE ORAL
Refills: 0 | DISCHARGE

## 2024-06-10 RX ORDER — LIPASE/PROTEASE/AMYLASE 16-48-48K
1 CAPSULE,DELAYED RELEASE (ENTERIC COATED) ORAL
Refills: 0 | DISCHARGE

## 2024-06-10 RX ORDER — FAMOTIDINE 10 MG/ML
1 INJECTION INTRAVENOUS
Refills: 0 | DISCHARGE

## 2024-06-10 RX ORDER — ESOMEPRAZOLE MAGNESIUM 40 MG/1
1 CAPSULE, DELAYED RELEASE ORAL
Refills: 0 | DISCHARGE

## 2024-06-10 RX ORDER — INSULIN GLARGINE 100 [IU]/ML
4 INJECTION, SOLUTION SUBCUTANEOUS
Refills: 0 | DISCHARGE

## 2024-06-10 RX ORDER — PREGABALIN 225 MG/1
1 CAPSULE ORAL
Refills: 0 | DISCHARGE

## 2024-06-10 RX ORDER — ASCORBIC ACID 60 MG
0 TABLET,CHEWABLE ORAL
Refills: 0 | DISCHARGE

## 2024-06-10 RX ORDER — ALBUTEROL 90 UG/1
3 AEROSOL, METERED ORAL
Refills: 0 | DISCHARGE

## 2024-06-10 RX ORDER — SERTRALINE 25 MG/1
1 TABLET, FILM COATED ORAL
Refills: 0 | DISCHARGE

## 2024-06-10 RX ORDER — METHENAMINE MANDELATE 1 G
1 TABLET ORAL
Refills: 0 | DISCHARGE

## 2024-07-02 ENCOUNTER — INPATIENT (INPATIENT)
Facility: HOSPITAL | Age: 84
LOS: 6 days | Discharge: HOME | DRG: 293 | End: 2024-07-09
Attending: INTERNAL MEDICINE | Admitting: INTERNAL MEDICINE
Payer: MEDICARE

## 2024-07-02 VITALS
WEIGHT: 89.95 LBS | HEART RATE: 75 BPM | DIASTOLIC BLOOD PRESSURE: 61 MMHG | SYSTOLIC BLOOD PRESSURE: 100 MMHG | RESPIRATION RATE: 20 BRPM | OXYGEN SATURATION: 95 % | TEMPERATURE: 98 F

## 2024-07-02 DIAGNOSIS — Z90.410 ACQUIRED TOTAL ABSENCE OF PANCREAS: Chronic | ICD-10-CM

## 2024-07-02 DIAGNOSIS — I50.9 HEART FAILURE, UNSPECIFIED: ICD-10-CM

## 2024-07-02 PROBLEM — K21.9 GASTRO-ESOPHAGEAL REFLUX DISEASE WITHOUT ESOPHAGITIS: Chronic | Status: ACTIVE | Noted: 2024-04-04

## 2024-07-02 PROBLEM — E11.9 TYPE 2 DIABETES MELLITUS WITHOUT COMPLICATIONS: Chronic | Status: ACTIVE | Noted: 2024-04-04

## 2024-07-02 PROBLEM — F03.90 UNSPECIFIED DEMENTIA, UNSPECIFIED SEVERITY, WITHOUT BEHAVIORAL DISTURBANCE, PSYCHOTIC DISTURBANCE, MOOD DISTURBANCE, AND ANXIETY: Chronic | Status: ACTIVE | Noted: 2024-04-04

## 2024-07-02 PROBLEM — I25.10 ATHEROSCLEROTIC HEART DISEASE OF NATIVE CORONARY ARTERY WITHOUT ANGINA PECTORIS: Chronic | Status: ACTIVE | Noted: 2024-04-04

## 2024-07-02 PROBLEM — C25.9 MALIGNANT NEOPLASM OF PANCREAS, UNSPECIFIED: Chronic | Status: ACTIVE | Noted: 2024-04-04

## 2024-07-02 LAB
ALBUMIN SERPL ELPH-MCNC: 2 G/DL — LOW (ref 3.3–5)
ALP SERPL-CCNC: 107 U/L — SIGNIFICANT CHANGE UP (ref 40–120)
ALT FLD-CCNC: 22 U/L — SIGNIFICANT CHANGE UP (ref 10–45)
ANION GAP SERPL CALC-SCNC: 10 MMOL/L — SIGNIFICANT CHANGE UP (ref 5–17)
AST SERPL-CCNC: 26 U/L — SIGNIFICANT CHANGE UP (ref 10–40)
BASOPHILS # BLD AUTO: 0.06 K/UL — SIGNIFICANT CHANGE UP (ref 0–0.2)
BASOPHILS NFR BLD AUTO: 0.5 % — SIGNIFICANT CHANGE UP (ref 0–2)
BILIRUB SERPL-MCNC: 0.5 MG/DL — SIGNIFICANT CHANGE UP (ref 0.2–1.2)
BUN SERPL-MCNC: 16 MG/DL — SIGNIFICANT CHANGE UP (ref 7–23)
CALCIUM SERPL-MCNC: 8.2 MG/DL — LOW (ref 8.4–10.5)
CHLORIDE SERPL-SCNC: 102 MMOL/L — SIGNIFICANT CHANGE UP (ref 96–108)
CO2 SERPL-SCNC: 27 MMOL/L — SIGNIFICANT CHANGE UP (ref 22–31)
CREAT SERPL-MCNC: 0.45 MG/DL — LOW (ref 0.5–1.3)
EGFR: 94 ML/MIN/1.73M2 — SIGNIFICANT CHANGE UP
EOSINOPHIL # BLD AUTO: 0.06 K/UL — SIGNIFICANT CHANGE UP (ref 0–0.5)
EOSINOPHIL NFR BLD AUTO: 0.5 % — SIGNIFICANT CHANGE UP (ref 0–6)
FLUAV AG NPH QL: SIGNIFICANT CHANGE UP
FLUBV AG NPH QL: SIGNIFICANT CHANGE UP
GLUCOSE BLDC GLUCOMTR-MCNC: 115 MG/DL — HIGH (ref 70–99)
GLUCOSE SERPL-MCNC: 154 MG/DL — HIGH (ref 70–99)
HCT VFR BLD CALC: 32.9 % — LOW (ref 34.5–45)
HGB BLD-MCNC: 10.1 G/DL — LOW (ref 11.5–15.5)
IMM GRANULOCYTES NFR BLD AUTO: 0.4 % — SIGNIFICANT CHANGE UP (ref 0–0.9)
LYMPHOCYTES # BLD AUTO: 1.37 K/UL — SIGNIFICANT CHANGE UP (ref 1–3.3)
LYMPHOCYTES # BLD AUTO: 11.7 % — LOW (ref 13–44)
MCHC RBC-ENTMCNC: 28.2 PG — SIGNIFICANT CHANGE UP (ref 27–34)
MCHC RBC-ENTMCNC: 30.7 GM/DL — LOW (ref 32–36)
MCV RBC AUTO: 91.9 FL — SIGNIFICANT CHANGE UP (ref 80–100)
MONOCYTES # BLD AUTO: 0.45 K/UL — SIGNIFICANT CHANGE UP (ref 0–0.9)
MONOCYTES NFR BLD AUTO: 3.9 % — SIGNIFICANT CHANGE UP (ref 2–14)
NEUTROPHILS # BLD AUTO: 9.69 K/UL — HIGH (ref 1.8–7.4)
NEUTROPHILS NFR BLD AUTO: 83 % — HIGH (ref 43–77)
NRBC # BLD: 0 /100 WBCS — SIGNIFICANT CHANGE UP (ref 0–0)
NT-PROBNP SERPL-SCNC: 2014 PG/ML — HIGH (ref 0–300)
PLATELET # BLD AUTO: 363 K/UL — SIGNIFICANT CHANGE UP (ref 150–400)
POTASSIUM SERPL-MCNC: 3.6 MMOL/L — SIGNIFICANT CHANGE UP (ref 3.5–5.3)
POTASSIUM SERPL-SCNC: 3.6 MMOL/L — SIGNIFICANT CHANGE UP (ref 3.5–5.3)
PROT SERPL-MCNC: 5.3 G/DL — LOW (ref 6–8.3)
RBC # BLD: 3.58 M/UL — LOW (ref 3.8–5.2)
RBC # FLD: 16 % — HIGH (ref 10.3–14.5)
RSV RNA NPH QL NAA+NON-PROBE: SIGNIFICANT CHANGE UP
SARS-COV-2 RNA SPEC QL NAA+PROBE: SIGNIFICANT CHANGE UP
SODIUM SERPL-SCNC: 139 MMOL/L — SIGNIFICANT CHANGE UP (ref 135–145)
TROPONIN T, HIGH SENSITIVITY RESULT: 63 NG/L — HIGH (ref 0–51)
TROPONIN T, HIGH SENSITIVITY RESULT: 64 NG/L — HIGH (ref 0–51)
WBC # BLD: 11.68 K/UL — HIGH (ref 3.8–10.5)
WBC # FLD AUTO: 11.68 K/UL — HIGH (ref 3.8–10.5)

## 2024-07-02 PROCEDURE — 99285 EMERGENCY DEPT VISIT HI MDM: CPT | Mod: GC

## 2024-07-02 PROCEDURE — 71045 X-RAY EXAM CHEST 1 VIEW: CPT | Mod: 26

## 2024-07-02 RX ORDER — LIPASE/PROTEASE/AMYLASE 4.5-25-20K
1 CAPSULE,DELAYED RELEASE (ENTERIC COATED) ORAL
Refills: 0 | DISCHARGE

## 2024-07-02 RX ORDER — ESOMEPRAZOLE SODIUM 40 MG/5ML
1 INJECTION INTRAVENOUS
Refills: 0 | DISCHARGE

## 2024-07-02 RX ORDER — INSULIN LISPRO 100 [IU]/ML
2 INJECTION, SOLUTION SUBCUTANEOUS
Refills: 0 | DISCHARGE

## 2024-07-02 RX ORDER — FAMOTIDINE 40 MG
1 TABLET ORAL
Refills: 0 | DISCHARGE

## 2024-07-02 RX ORDER — ACETAMINOPHEN 325 MG
600 TABLET ORAL ONCE
Refills: 0 | Status: COMPLETED | OUTPATIENT
Start: 2024-07-02 | End: 2024-07-02

## 2024-07-02 RX ORDER — INSULIN GLARGINE 100 [IU]/ML
4 INJECTION, SOLUTION SUBCUTANEOUS
Refills: 0 | DISCHARGE

## 2024-07-02 RX ORDER — LORATADINE 10 MG/1
1 TABLET ORAL
Refills: 0 | DISCHARGE

## 2024-07-02 RX ORDER — METHENAMINE HIPPURATE 1 G
1 TABLET ORAL
Refills: 0 | DISCHARGE

## 2024-07-02 RX ORDER — SERTRALINE HYDROCHLORIDE 100 MG/1
1 TABLET, FILM COATED ORAL
Refills: 0 | DISCHARGE

## 2024-07-02 RX ORDER — ASPIRIN 325 MG/1
1 TABLET, FILM COATED ORAL
Refills: 0 | DISCHARGE

## 2024-07-02 RX ORDER — FUROSEMIDE 10 MG/ML
20 INJECTION, SOLUTION INTRAMUSCULAR; INTRAVENOUS ONCE
Refills: 0 | Status: COMPLETED | OUTPATIENT
Start: 2024-07-02 | End: 2024-07-02

## 2024-07-02 RX ORDER — DOXAZOSIN MESYLATE 2 MG/1
1 TABLET ORAL
Refills: 0 | DISCHARGE

## 2024-07-02 RX ORDER — MEMANTINE HYDROCHLORIDE 7 MG/1
1 CAPSULE, EXTENDED RELEASE ORAL
Refills: 0 | DISCHARGE

## 2024-07-02 RX ADMIN — FUROSEMIDE 20 MILLIGRAM(S): 10 INJECTION, SOLUTION INTRAMUSCULAR; INTRAVENOUS at 15:37

## 2024-07-02 RX ADMIN — Medication 240 MILLIGRAM(S): at 22:58

## 2024-07-02 NOTE — ED PROVIDER NOTE - ATTENDING CONTRIBUTION TO CARE
Danette Alba DO. EM Attending Physician.    84-year-old female presents with worsening edema of lower extremities sent in for abnormal labs BNP 2000.  Accompanied by aide and daughter at bedside who provide the history.   Arrived hemodynamically stable nontoxic-appearing afebrile. Satting 97% on room air upon my evaluation.  ECG NSR with LAUREN/TWI. Lungs clear to auscultation. Bedside pocus with R pleural effusion, L B-lines.   DDx includes but not limited to CHF, heme/electrolyte abnormalities, pna.   Will obtain labs, CXR, cardiac monitoring, ECG, and likely admit.    I performed a history and physical exam of the patient and discussed their management with the resident and/or advanced care provider. I reviewed the resident and/or advanced care provider's note and agree with the documented findings and plan of care. I was present and available for all procedures.    Attending Contribution to Care: I performed a history and physical exam of the patient and discussed their management with the resident. I reviewed the resident's note and agree with the documented findings and plan of care, except as noted. My medical decision making and observations are found above. Danette Alba DO. EM Attending Physician.    84-year-old female presents with worsening edema of lower extremities sent in for abnormal labs BNP 2000.  Accompanied by aide and daughter at bedside who provide the history.   Arrived hemodynamically stable nontoxic-appearing afebrile. Satting 97% on room air upon my evaluation.  Exam: Fatigued appearing elderly female, heart rate regular, lungs CTA B/L, abdomen soft nontender nondistended. Skin clean and dry. Trace lower extremity edema B/L, equal.   ECG NSR with LAUREN/TWI. Lungs clear to auscultation. Bedside pocus with R pleural effusion, L B-lines.   DDx includes but not limited to CHF, heme/electrolyte abnormalities, pna.   Will obtain labs, CXR, cardiac monitoring, ECG, and likely admit.    I performed a history and physical exam of the patient and discussed their management with the resident and/or advanced care provider. I reviewed the resident and/or advanced care provider's note and agree with the documented findings and plan of care. I was present and available for all procedures.    Attending Contribution to Care: I performed a history and physical exam of the patient and discussed their management with the resident. I reviewed the resident's note and agree with the documented findings and plan of care, except as noted. My medical decision making and observations are found above.

## 2024-07-02 NOTE — ED PROVIDER NOTE - NS ED ATTENDING STATEMENT MOD
I have seen and examined this patient and fully participated in the care of this patient as the teaching attending.  The service was shared with the KARISSA.  I reviewed and verified the documentation.

## 2024-07-02 NOTE — ED PROVIDER NOTE - PHYSICAL EXAMINATION
Vitals: afebrile, 73 HR, 124/76, 20 RR, 96% spO2 on room air  GENERAL: NAD, lying in bed comfortably  HEAD:  Atraumatic, Normocephalic  EYES: EOMI, PERRLA, conjunctiva and sclera clear, no nystagmus noted  ENT: Moist mucous membranes  NECK: Supple, No JVD, trachea midline  CHEST/LUNG: Mild inspiratory rales occasionally notes in R basilar lung. L lung clear to auscultation. No wheezing, or rubs. Unlabored respirations.  HEART: Regular rate and rhythm; No murmurs, rubs, or gallops, normal S1/S2  ABDOMEN: normal bowel sounds; Soft, nontender, nondistended, no organomegaly. No distention, no fluid wave.  : No rashes, trauma, swelling.  EXTREMITIES:  2+ Peripheral Pulses, brisk capillary refill. Trace pitting edema in talar area in L foot. R foot w/o edema. No clubbing, cyanosis. R heel has stage 1-2 pressure ulcer, w/ freshly changed bandages. L heel has blister.  MSK: No gross deformities noted   Neurological:  nonverbal, no gross neurologic deficits   SKIN: No rashes or lesions  PSYCH: Normal mood, affect Vitals: afebrile, 73 HR, 124/76, 20 RR, 96% spO2 on room air  GENERAL: NAD, lying in bed comfortably  HEAD:  Atraumatic, Normocephalic  EYES: EOMI, PERRLA, conjunctiva and sclera clear, no nystagmus noted  ENT: Moist mucous membranes  NECK: Supple, No JVD, trachea midline  CHEST/LUNG: Mild inspiratory rales occasionally notes in R basilar lung. L lung clear to auscultation. No wheezing, or rubs. Unlabored respirations. Roughly 1.5 cm freely moveable nodule noted at sternum.  HEART: Regular rate and rhythm; No murmurs, rubs, or gallops, normal S1/S2  ABDOMEN: normal bowel sounds; Soft, nontender, nondistended, no organomegaly. No distention, no fluid wave.  : No rashes, trauma, swelling.  EXTREMITIES:  2+ Peripheral Pulses, brisk capillary refill. Trace pitting edema in talar area in L foot. R foot w/o edema. No clubbing, cyanosis. R heel has stage 1-2 pressure ulcer, w/ freshly changed bandages. L heel has blister.  MSK: No gross deformities noted   Neurological:  nonverbal, no gross neurologic deficits   SKIN: No rashes or lesions  PSYCH: Normal mood, affect

## 2024-07-02 NOTE — ED PROVIDER NOTE - CLINICAL SUMMARY MEDICAL DECISION MAKING FREE TEXT BOX
84 y/o female w/ PMHx CAD s/p PCI, IDDM2, 2/2 Whipple procedure for pancreatic CA, dementia (baseline AAOx1-2, bedbound, nonverbal) presents via EMS today for 8-10 days worth of  LE swelling. Sent in by PCP for abnormal labs with BNP of 2826.    PE remarkable for R lung mild inspiratory rales, R heel ulcer, L heel blister.    Ddx includes but is not limited to CHF exacerbation, PNA, URI, ACS, electrolyte abnormality, arhythmia. Likely CHF exacerbation in the setting of high BNP, reported SOB, and mild right lung inspiratory rales.    Plan includes CXR, EKG, CBC, CMP, BNP, titrate o2 if needed, trops, reassess. 85 y/o female w/ PMHx CAD s/p PCI, IDDM2, 2/2 Whipple procedure for pancreatic CA, dementia (baseline AAOx1-2, bedbound, nonverbal) presents via EMS today for 8-10 days worth of  LE swelling. Sent in by PCP for abnormal labs with BNP of 2826.    PE remarkable for R lung mild inspiratory rales, R heel ulcer, L heel blister.    Ddx includes but is not limited to CHF exacerbation, PNA, URI, ACS, electrolyte abnormality, arhythmia. Likely CHF exacerbation in the setting of high BNP, reported SOB, and mild right lung inspiratory rales.    Plan includes CXR, EKG, CBC, CMP, BNP, titrate o2 if needed, trops, reassess.

## 2024-07-02 NOTE — H&P ADULT - HISTORY OF PRESENT ILLNESS
83 y/o female w/ PMHx CAD s/p PCI, IDDM2, 2/2 Whipple procedure for pancreatic CA, dementia (baseline AAOx1-2, bedbound, nonverbal) presents via EMS today for 8-10 days worth of  LE swelling. On 2L NC, baseline no oxygen. Caretaker notes that pt has b/l LE swelling, upper b/l arms R>L have been more swollen than usual, and possible abdominal swelling. Notes that pt occasionally has episodes that seems like pt is wheezing for air. Pt was sent into today for by PCP after son in law called 911 after abnormal lab results with a BNP on 6/28 of 2826. Pt denies PND, orthopnea, dysuria, rashes, trauma, dietary changes, trauma, recent travel or infectious contacts, hx of smoking, asthma, copd.

## 2024-07-02 NOTE — CONSULT NOTE ADULT - SUBJECTIVE AND OBJECTIVE BOX
[facility]), [facility]        Patient is a 84y old  Female who presents with a chief complaint of   Excerpt from H&P,"     85 y/o female w/ PMHx CAD s/p PCI, IDDM2, 2/2 Whipple procedure for pancreatic CA, dementia (baseline AAOx1-2, bedbound, nonverbal) presents via EMS today for 8-10 days worth of  LE swelling. On 2L NC, baseline no oxygen. Caretaker notes that pt has b/l LE swelling, upper b/l arms R>L have been more swollen than usual, and possible abdominal swelling. Notes that pt occasionally has episodes that seems like pt is wheezing for air. Pt was sent into today for by PCP after son in law called 911 after abnormal lab results with a BNP on 6/28 of 2826. Pt denies PND, orthopnea, dysuria, rashes, trauma, dietary changes, trauma, recent travel or infectious contacts, hx of smoking, asthma, copd.          *****PAST MEDICAL / Surgical  HISTORY:  PAST MEDICAL & SURGICAL HISTORY:  CAD (coronary artery disease)      Dementia      Pancreatic cancer      Diabetes mellitus      GERD (gastroesophageal reflux disease)      H/O Whipple procedure               *****FAMILY HISTORY:  FAMILY HISTORY:           *****SOCIAL HISTORY:  Alcohol: None  Smoking: None         *****ALLERGIES:   Allergies    No Known Allergies    Intolerances             *****MEDICATIONS: current medication reviewed and documented.   MEDICATIONS  (STANDING):    MEDICATIONS  (PRN):           *****REVIEW OF SYSTEM:  GEN: no fever, no chills, no pain  RESP: no SOB, no cough, no sputum  CVS: no chest pain, no palpitations, no edema  GI: no abdominal pain, no nausea, no vomiting, no constipation, no diarrhea  : no dysurea, no frequency, no hematurea  Neuro: no headache, no dizziness  PSYCH: no anxiety, no depression  Derm : no itching, no rash         *****VITAL SIGNS:  T(C): 37.2 (07-02-24 @ 15:44), Max: 37.2 (07-02-24 @ 15:44)  HR: 72 (07-02-24 @ 15:44) (72 - 75)  BP: 123/55 (07-02-24 @ 15:44) (100/61 - 124/76)  RR: 18 (07-02-24 @ 15:44) (18 - 20)  SpO2: 97% (07-02-24 @ 15:44) (95% - 97%)  Wt(kg): --           *****PHYSICAL EXAM:   Alert oriented x2   Attention comprehension are ok  Able to name, repeat  without any difficulty.   Able to follow1 step commands.     EOMI fundi not visualized,  blinks to threat   No facial asymmetry   Tongue is midline   Moving all 4 ext symmetrically   sensation is grossly symmetric  Gait : not assessed.  B/L down going toes               *****LAB AND IMAGING:                          10.1   11.68 )-----------( 363      ( 02 Jul 2024 13:57 )             32.9               07-02    139  |  102  |  16  ----------------------------<  154<H>  3.6   |  27  |  0.45<L>    Ca    8.2<L>      02 Jul 2024 13:57    TPro  5.3<L>  /  Alb  2.0<L>  /  TBili  0.5  /  DBili  x   /  AST  26  /  ALT  22  /  AlkPhos  107  07-02    Urinalysis Basic - ( 02 Jul 2024 13:57 )    Color: x / Appearance: x / SG: x / pH: x  Gluc: 154 mg/dL / Ketone: x  / Bili: x / Urobili: x   Blood: x / Protein: x / Nitrite: x   Leuk Esterase: x / RBC: x / WBC x   Sq Epi: x / Non Sq Epi: x / Bacteria: x        [All pertinent recent Imaging reports reviewed]         *****A S S E S S M E N T   A N D   P L A N :      Excerpt from H&P,"     85 y/o female w/ PMHx CAD s/p PCI, IDDM2, 2/2 Whipple procedure for pancreatic CA, dementia (baseline AAOx1-2, bedbound, nonverbal) presents via EMS today for 8-10 days worth of  LE swelling. On 2L NC, baseline no oxygen. Caretaker notes that pt has b/l LE swelling, upper b/l arms R>L have been more swollen than usual, and possible abdominal swelling. Notes that pt occasionally has episodes that seems like pt is wheezing for air. Pt was sent into today for by PCP after son in law called 911 after abnormal lab results with a BNP on 6/28 of 2826. Pt denies PND, orthopnea, dysuria, rashes, trauma, dietary changes, trauma, recent travel or infectious contacts, hx of smoking, asthma, copd.     Problem/Recommendations 1: ams   likely multifactorial metabolic encephalopathy worsening underlying dementia  plan regulate sleep wake cycle   avoid daytime somnolence   r/o infectious process   low albumin likely with low oncotic pressure                ___________________________  Will follow with you.  Thank you,  Perri Sharif MD  Diplomate of the American Board of Neurology and Psychiatry.  Diplomate of the American Board of Vascular Neurology.   [facility] ([facility]), [facility]   Ph: [phone]    Differential diagnosis and plan of care discussed with patient after the evaluation.   Advanced care planning options discussed.   Pain assessed and judicious use of narcotics when appropriate was discussed.  Importance of Fall prevention discussed.  Counseling on Smoking and Alcohol cessation was offered when appropriate.  Counseling on Diet, exercise, and medication compliance was done.   83 minutes spent on the total encounter;  more than 50 % of the visit was spent on counseling  and or coordinating care by the attending physician.    Thank you for allowing me to participate in the care of this melissa patient. Please do not hesitate to call me if you have any questions.     This and subsequent notes  will  inherently be subject to errors including those of syntax and substitutions which may escape proofreading. In such instances original meaning may be extrapolated by contextual derivation.    [facility]), [facility]        Patient is a 84y old  Female who presents with a chief complaint of   Excerpt from H&P,"     85 y/o female w/ PMHx CAD s/p PCI, IDDM2, 2/2 Whipple procedure for pancreatic CA, dementia (baseline AAOx1-2, bedbound, nonverbal) presents via EMS today for 8-10 days worth of  LE swelling. On 2L NC, baseline no oxygen. Caretaker notes that pt has b/l LE swelling, upper b/l arms R>L have been more swollen than usual, and possible abdominal swelling. Notes that pt occasionally has episodes that seems like pt is wheezing for air. Pt was sent into today for by PCP after son in law called 911 after abnormal lab results with a BNP on 6/28 of 2826. Pt denies PND, orthopnea, dysuria, rashes, trauma, dietary changes, trauma, recent travel or infectious contacts, hx of smoking, asthma, copd.          *****PAST MEDICAL / Surgical  HISTORY:  PAST MEDICAL & SURGICAL HISTORY:  CAD (coronary artery disease)      Dementia      Pancreatic cancer      Diabetes mellitus      GERD (gastroesophageal reflux disease)      H/O Whipple procedure               *****FAMILY HISTORY:  FAMILY HISTORY:           *****SOCIAL HISTORY:  Alcohol: None  Smoking: None         *****ALLERGIES:   Allergies    No Known Allergies    Intolerances             *****MEDICATIONS: current medication reviewed and documented.   MEDICATIONS  (STANDING):    MEDICATIONS  (PRN):           *****REVIEW OF SYSTEM:  GEN: no fever, no chills, no pain  RESP: no SOB, no cough, no sputum  CVS: no chest pain, no palpitations, no edema  GI: no abdominal pain, no nausea, no vomiting, no constipation, no diarrhea  : no dysurea, no frequency, no hematurea  Neuro: no headache, no dizziness  PSYCH: no anxiety, no depression  Derm : no itching, no rash         *****VITAL SIGNS:  T(C): 37.2 (07-02-24 @ 15:44), Max: 37.2 (07-02-24 @ 15:44)  HR: 72 (07-02-24 @ 15:44) (72 - 75)  BP: 123/55 (07-02-24 @ 15:44) (100/61 - 124/76)  RR: 18 (07-02-24 @ 15:44) (18 - 20)  SpO2: 97% (07-02-24 @ 15:44) (95% - 97%)  Wt(kg): --           *****PHYSICAL EXAM:   pale cachectic lethargic   moaning   Alert oriented 1   Attention comprehension are limited      Able to follow1 step commands.     EOMI fundi not visualized,  blinks to threat   No facial asymmetry   Tongue is midline   funcitonal quad      Gait : not assessed.  B/L down going toes               *****LAB AND IMAGING:                          10.1 11.68 )-----------( 363      ( 02 Jul 2024 13:57 )             32.9               07-02    139  |  102  |  16  ----------------------------<  154<H>  3.6   |  27  |  0.45<L>    Ca    8.2<L>      02 Jul 2024 13:57    TPro  5.3<L>  /  Alb  2.0<L>  /  TBili  0.5  /  DBili  x   /  AST  26  /  ALT  22  /  AlkPhos  107  07-02    Urinalysis Basic - ( 02 Jul 2024 13:57 )    Color: x / Appearance: x / SG: x / pH: x  Gluc: 154 mg/dL / Ketone: x  / Bili: x / Urobili: x   Blood: x / Protein: x / Nitrite: x   Leuk Esterase: x / RBC: x / WBC x   Sq Epi: x / Non Sq Epi: x / Bacteria: x        [All pertinent recent Imaging reports reviewed]         *****A S S E S S M E N T   A N D   P L A N :      Excerpt from H&P,"     85 y/o female w/ PMHx CAD s/p PCI, IDDM2, 2/2 Whipple procedure for pancreatic CA, dementia (baseline AAOx1-2, bedbound, nonverbal) presents via EMS today for 8-10 days worth of  LE swelling. On 2L NC, baseline no oxygen. Caretaker notes that pt has b/l LE swelling, upper b/l arms R>L have been more swollen than usual, and possible abdominal swelling. Notes that pt occasionally has episodes that seems like pt is wheezing for air. Pt was sent into today for by PCP after son in law called 911 after abnormal lab results with a BNP on 6/28 of 2826. Pt denies PND, orthopnea, dysuria, rashes, trauma, dietary changes, trauma, recent travel or infectious contacts, hx of smoking, asthma, copd.     Problem/Recommendations 1: ams   likely multifactorial metabolic encephalopathy worsening underlying dementia  plan: regulate sleep wake cycle      r/o infectious process   low albumin likely with low oncotic pressure   address goc   as per daughter pt on tramadol prn for pain                 ___________________________  Will follow with you.  Thank you,  Perri Sharif MD  Diplomate of the American Board of Neurology and Psychiatry.  Diplomate of the American Board of Vascular Neurology.   [facility] ([facility]), [facility]   Ph: [phone]    Differential diagnosis and plan of care discussed with patient after the evaluation.   Advanced care planning options discussed.   Pain assessed and judicious use of narcotics when appropriate was discussed.  Importance of Fall prevention discussed.  Counseling on Smoking and Alcohol cessation was offered when appropriate.  Counseling on Diet, exercise, and medication compliance was done.   83 minutes spent on the total encounter;  more than 50 % of the visit was spent on counseling  and or coordinating care by the attending physician.    Thank you for allowing me to participate in the care of this melissa patient. Please do not hesitate to call me if you have any questions.     This and subsequent notes  will  inherently be subject to errors including those of syntax and substitutions which may escape proofreading. In such instances original meaning may be extrapolated by contextual derivation.

## 2024-07-02 NOTE — ED ADULT NURSE NOTE - NSFALLHARMRISKINTERV_ED_ALL_ED
Assistance OOB with selected safe patient handling equipment if applicable/Communicate risk of Fall with Harm to all staff, patient, and family/Provide visual cue: red socks, yellow wristband, yellow gown, etc/Reinforce activity limits and safety measures with patient and family/Bed in lowest position, wheels locked, appropriate side rails in place/Call bell, personal items and telephone in reach/Instruct patient to call for assistance before getting out of bed/chair/stretcher/Non-slip footwear applied when patient is off stretcher/[place] to call system/Physically safe environment - no spills, clutter or unnecessary equipment/Purposeful Proactive Rounding/Room/bathroom lighting operational, light cord in reach

## 2024-07-02 NOTE — ED ADULT NURSE NOTE - NSFALLRISKASMTTYPE_ED_ALL_ED
SW following. Discussed with RN, pt accepted at [facility], discharge orders faxed to [facility]. Transportation arranged by [facility] for [id number]. RN notified. SW met with pt 
earlier, she was still agreeable, all questions answered. No further SW needs.
SW following. Discussed with RN, pt from home alone, room air, NPO. Pt had surgery 11/28. 
Awaiting PT/OT. COVID PCR requested for possible placement. SW will continue to follow.
SW following. Discussed with RN, therapy recommending SNF. Pt agreeable and wants [facility]. 
Referral phoned and faxed. Pt accepted for transfer tomorrow. PCR COVID pending. SW will 
continue to follow.
Initial (On Arrival)

## 2024-07-02 NOTE — ED ADULT NURSE REASSESSMENT NOTE - NS ED NURSE REASSESS COMMENT FT1
report received from JACKLYN Davey. patient resting in NAD. rectal temp 99. repeat troponin obtained and sent to lab as per MD order. placed on purewick at this time. patient and daughter at bedside updated on plan of care. TBA. comfort and safety maintained

## 2024-07-02 NOTE — ED PROVIDER NOTE - OBJECTIVE STATEMENT
82 y/o female w/ PMHx CAD s/p PCI, IDDM2, 2/2 Whipple procedure for pancreatic CA, dementia (baseline AAOx1-2, bedbound, nonverbal) presents via EMS today for 8-10 days worth of  LE swelling. On 2L NC, baseline no oxygen. Caretaker notes that pt has b/l LE swelling, upper b/l arms R>L have been more swollen than usual, and possible abdominal swelling. Notes that pt occasionally has episodes that seems like pt is wheezing for air. Pt was sent into today for by PCP after son in law called 911 after abnormal lab results with a BNP on 6/28 of 2826. Pt denies PND, orthopnea, dysuria, rashes, trauma, dietary changes, trauma, recent travel or infectious contacts, hx of smoking, asthma, copd. 83 y/o female w/ PMHx CAD s/p PCI, IDDM2, 2/2 Whipple procedure for pancreatic CA, dementia (baseline AAOx1-2, bedbound, nonverbal) presents via EMS today for 8-10 days worth of  LE swelling. On 2L NC, baseline no oxygen. Caretaker notes that pt has b/l LE swelling, upper b/l arms R>L have been more swollen than usual, and possible abdominal swelling. Notes that pt occasionally has episodes that seems like pt is wheezing for air. Pt was sent into today for by PCP after son in law called 911 after abnormal lab results with a BNP on 6/28 of 2826. Pt denies PND, orthopnea, dysuria, rashes, trauma, dietary changes, trauma, recent travel or infectious contacts, hx of smoking, asthma, copd.

## 2024-07-02 NOTE — ED ADULT NURSE NOTE - NS ED NURSE LEVEL OF CONSCIOUSNESS ORIENTATION
Pt is looking to go back to OCP.  Pt delivered vaginally on 11/16/17.  Post Partum appointment on 1/2/18.  Pt states she is no longer pumping or breastfeeding.   Pt states she would like an OCP with no or low estrogen due to family history of breast cancer.      Msg to Md: pt looking to go back to OCP after delivery. No longer breastfeeding. Did attach previous OCP from 2015. Correct pharmacy attached.      Nonverbal

## 2024-07-02 NOTE — H&P ADULT - ASSESSMENT
83 y/o female w/ PMHx CAD s/p PCI, IDDM2, 2/2 Whipple procedure for pancreatic CA, dementia (baseline AAOx1-2, bedbound, nonverbal) presents via EMS today for 8-10 days worth of  LE swelling. On 2L NC, baseline no oxygen. Caretaker notes that pt has b/l LE swelling, upper b/l arms R>L have been more swollen than usual, and possible abdominal swelling. Notes that pt occasionally has episodes that seems like pt is wheezing for air. Pt was sent into on 7/2  by PCP after son in law called 911 after abnormal lab results with a BNP on 6/28 of 2826. Pt denies PND, orthopnea, dysuria, rashes, trauma, dietary changes, trauma, recent travel or infectious contacts, hx of smoking, asthma, copd.    LE edema  acute on chronic diastolic CHF  Multiple sacral decubiti  IDDM  severe malnutrition    - wound care , card, and neuro called.  - IV LAsix w strict Is and Os  - check TTE  - palliaitive care to discuss GOC  - Ptn is bed bound w muliple decubiti ulcers  DVT ppx Lovenox sc

## 2024-07-02 NOTE — H&P ADULT - NSHPPHYSICALEXAM_GEN_ALL_CORE
T(C): 37.2 (07-02-24 @ 15:44), Max: 37.2 (07-02-24 @ 15:44)  HR: 72 (07-02-24 @ 15:44) (72 - 75)  BP: 123/55 (07-02-24 @ 15:44) (100/61 - 124/76)  RR: 18 (07-02-24 @ 15:44) (18 - 20)  SpO2: 97% (07-02-24 @ 15:44) (95% - 97%)    PHYSICAL EXAM:  GENERAL: NAD, well-developed  HEAD:  Atraumatic, Normocephalic  EYES: EOMI, PERRLA, conjunctiva and sclera clear  NECK: Supple, No JVD  CHEST/LUNG: Clear to auscultation bilaterally; No wheeze  HEART: Regular rate and rhythm; No murmurs, rubs, or gallops  ABDOMEN: Soft, Nontender, Nondistended; Bowel sounds present  EXTREMITIES:  2+ Peripheral Pulses, No clubbing, cyanosis, or edema  PSYCH: AAOx3  NEUROLOGY: non-focal  SKIN: No rashes or lesions

## 2024-07-02 NOTE — ED ADULT NURSE NOTE - OBJECTIVE STATEMENT
Female 84 years old with history of DM and Dementia, brought in by EMS from home for swelling of lower extremities Pt non verbal, as per HHA Female 84 years old with history of DM and Dementia, brought in by EMS from home for swelling of lower extremities Pt non verbal, as per HHA, son in law called EMS for pt's PCP advised him to send pt to ER for abnormal labs and swelling of lower extremities. BNP is elevated. HHA states that pt sometimes has wheezing, Moans all the time, doesn't follow commands. No cough, fever, chills, N/V or diarrhea. With open wound at right heel and blister at left heel. No drainage noted. HHA states dressing to wound done by podiatrist this morning. Abdomen soft and non tender. Safety maintained. Bed locked in lowest position.

## 2024-07-03 ENCOUNTER — RESULT REVIEW (OUTPATIENT)
Age: 84
End: 2024-07-03

## 2024-07-03 LAB
A1C WITH ESTIMATED AVERAGE GLUCOSE RESULT: 6.4 % — HIGH (ref 4–5.6)
ANION GAP SERPL CALC-SCNC: 12 MMOL/L — SIGNIFICANT CHANGE UP (ref 5–17)
BUN SERPL-MCNC: 14 MG/DL — SIGNIFICANT CHANGE UP (ref 7–23)
CALCIUM SERPL-MCNC: 8 MG/DL — LOW (ref 8.4–10.5)
CHLORIDE SERPL-SCNC: 99 MMOL/L — SIGNIFICANT CHANGE UP (ref 96–108)
CO2 SERPL-SCNC: 26 MMOL/L — SIGNIFICANT CHANGE UP (ref 22–31)
CREAT SERPL-MCNC: 0.4 MG/DL — LOW (ref 0.5–1.3)
EGFR: 96 ML/MIN/1.73M2 — SIGNIFICANT CHANGE UP
ESTIMATED AVERAGE GLUCOSE: 137 MG/DL — HIGH (ref 68–114)
GLUCOSE BLDC GLUCOMTR-MCNC: 117 MG/DL — HIGH (ref 70–99)
GLUCOSE BLDC GLUCOMTR-MCNC: 225 MG/DL — HIGH (ref 70–99)
GLUCOSE BLDC GLUCOMTR-MCNC: 229 MG/DL — HIGH (ref 70–99)
GLUCOSE BLDC GLUCOMTR-MCNC: 237 MG/DL — HIGH (ref 70–99)
GLUCOSE SERPL-MCNC: 179 MG/DL — HIGH (ref 70–99)
HCT VFR BLD CALC: 32.8 % — LOW (ref 34.5–45)
HGB BLD-MCNC: 10.4 G/DL — LOW (ref 11.5–15.5)
MCHC RBC-ENTMCNC: 28.1 PG — SIGNIFICANT CHANGE UP (ref 27–34)
MCHC RBC-ENTMCNC: 31.7 GM/DL — LOW (ref 32–36)
MCV RBC AUTO: 88.6 FL — SIGNIFICANT CHANGE UP (ref 80–100)
NRBC # BLD: 0 /100 WBCS — SIGNIFICANT CHANGE UP (ref 0–0)
PLATELET # BLD AUTO: 336 K/UL — SIGNIFICANT CHANGE UP (ref 150–400)
POTASSIUM SERPL-MCNC: 3.2 MMOL/L — LOW (ref 3.5–5.3)
POTASSIUM SERPL-SCNC: 3.2 MMOL/L — LOW (ref 3.5–5.3)
RBC # BLD: 3.7 M/UL — LOW (ref 3.8–5.2)
RBC # FLD: 16.1 % — HIGH (ref 10.3–14.5)
SODIUM SERPL-SCNC: 137 MMOL/L — SIGNIFICANT CHANGE UP (ref 135–145)
T3 SERPL-MCNC: 84 NG/DL — SIGNIFICANT CHANGE UP (ref 80–200)
T4 AB SER-ACNC: 5.3 UG/DL — SIGNIFICANT CHANGE UP (ref 4.6–12)
TSH SERPL-MCNC: 6.14 UIU/ML — HIGH (ref 0.27–4.2)
WBC # BLD: 10.11 K/UL — SIGNIFICANT CHANGE UP (ref 3.8–10.5)
WBC # FLD AUTO: 10.11 K/UL — SIGNIFICANT CHANGE UP (ref 3.8–10.5)

## 2024-07-03 PROCEDURE — 93970 EXTREMITY STUDY: CPT | Mod: 26

## 2024-07-03 PROCEDURE — 99232 SBSQ HOSP IP/OBS MODERATE 35: CPT

## 2024-07-03 PROCEDURE — 93306 TTE W/DOPPLER COMPLETE: CPT | Mod: 26

## 2024-07-03 RX ORDER — DEXTROSE 30 % IN WATER 30 %
15 VIAL (ML) INTRAVENOUS ONCE
Refills: 0 | Status: DISCONTINUED | OUTPATIENT
Start: 2024-07-03 | End: 2024-07-09

## 2024-07-03 RX ORDER — DEXTROSE MONOHYDRATE AND SODIUM CHLORIDE 5; .3 G/100ML; G/100ML
1000 INJECTION, SOLUTION INTRAVENOUS
Refills: 0 | Status: DISCONTINUED | OUTPATIENT
Start: 2024-07-03 | End: 2024-07-09

## 2024-07-03 RX ORDER — GLUCAGON HYDROCHLORIDE 1 MG/ML
1 INJECTION, POWDER, FOR SOLUTION INTRAMUSCULAR; INTRAVENOUS; SUBCUTANEOUS ONCE
Refills: 0 | Status: DISCONTINUED | OUTPATIENT
Start: 2024-07-03 | End: 2024-07-09

## 2024-07-03 RX ORDER — DEXTROSE 30 % IN WATER 30 %
25 VIAL (ML) INTRAVENOUS ONCE
Refills: 0 | Status: DISCONTINUED | OUTPATIENT
Start: 2024-07-03 | End: 2024-07-09

## 2024-07-03 RX ORDER — APIXABAN 5 MG/1
2.5 TABLET, FILM COATED ORAL EVERY 12 HOURS
Refills: 0 | Status: DISCONTINUED | OUTPATIENT
Start: 2024-07-03 | End: 2024-07-09

## 2024-07-03 RX ORDER — ENOXAPARIN SODIUM 100 MG/ML
40 INJECTION SUBCUTANEOUS EVERY 24 HOURS
Refills: 0 | Status: DISCONTINUED | OUTPATIENT
Start: 2024-07-03 | End: 2024-07-03

## 2024-07-03 RX ORDER — DEXTROSE MONOHYDRATE 100 MG/ML
125 INJECTION, SOLUTION INTRAVENOUS ONCE
Refills: 0 | Status: DISCONTINUED | OUTPATIENT
Start: 2024-07-03 | End: 2024-07-09

## 2024-07-03 RX ORDER — FUROSEMIDE 10 MG/ML
40 INJECTION, SOLUTION INTRAMUSCULAR; INTRAVENOUS
Refills: 0 | Status: DISCONTINUED | OUTPATIENT
Start: 2024-07-03 | End: 2024-07-07

## 2024-07-03 RX ORDER — INSULIN LISPRO 100 [IU]/ML
INJECTION, SOLUTION SUBCUTANEOUS AT BEDTIME
Refills: 0 | Status: DISCONTINUED | OUTPATIENT
Start: 2024-07-03 | End: 2024-07-09

## 2024-07-03 RX ORDER — ASPIRIN 325 MG/1
81 TABLET, FILM COATED ORAL DAILY
Refills: 0 | Status: DISCONTINUED | OUTPATIENT
Start: 2024-07-03 | End: 2024-07-09

## 2024-07-03 RX ORDER — MEMANTINE HYDROCHLORIDE 7 MG/1
10 CAPSULE, EXTENDED RELEASE ORAL
Refills: 0 | Status: DISCONTINUED | OUTPATIENT
Start: 2024-07-03 | End: 2024-07-09

## 2024-07-03 RX ORDER — LORATADINE 10 MG/1
10 TABLET ORAL DAILY
Refills: 0 | Status: DISCONTINUED | OUTPATIENT
Start: 2024-07-03 | End: 2024-07-09

## 2024-07-03 RX ORDER — ACETAMINOPHEN 325 MG
650 TABLET ORAL EVERY 6 HOURS
Refills: 0 | Status: DISCONTINUED | OUTPATIENT
Start: 2024-07-03 | End: 2024-07-09

## 2024-07-03 RX ORDER — INSULIN GLARGINE 100 [IU]/ML
5 INJECTION, SOLUTION SUBCUTANEOUS AT BEDTIME
Refills: 0 | Status: DISCONTINUED | OUTPATIENT
Start: 2024-07-03 | End: 2024-07-09

## 2024-07-03 RX ORDER — POTASSIUM CHLORIDE 600 MG/1
40 TABLET, FILM COATED, EXTENDED RELEASE ORAL ONCE
Refills: 0 | Status: COMPLETED | OUTPATIENT
Start: 2024-07-03 | End: 2024-07-03

## 2024-07-03 RX ORDER — FAMOTIDINE 40 MG
20 TABLET ORAL DAILY
Refills: 0 | Status: DISCONTINUED | OUTPATIENT
Start: 2024-07-03 | End: 2024-07-09

## 2024-07-03 RX ORDER — PANTOPRAZOLE SODIUM 40 MG/10ML
40 INJECTION, POWDER, FOR SOLUTION INTRAVENOUS
Refills: 0 | Status: DISCONTINUED | OUTPATIENT
Start: 2024-07-03 | End: 2024-07-09

## 2024-07-03 RX ORDER — DOXAZOSIN MESYLATE 2 MG/1
2 TABLET ORAL AT BEDTIME
Refills: 0 | Status: DISCONTINUED | OUTPATIENT
Start: 2024-07-03 | End: 2024-07-09

## 2024-07-03 RX ORDER — METOPROLOL TARTRATE 50 MG
12.5 TABLET ORAL
Refills: 0 | Status: DISCONTINUED | OUTPATIENT
Start: 2024-07-03 | End: 2024-07-06

## 2024-07-03 RX ORDER — LIPASE/PROTEASE/AMYLASE 4.5-25-20K
1 CAPSULE,DELAYED RELEASE (ENTERIC COATED) ORAL
Refills: 0 | Status: DISCONTINUED | OUTPATIENT
Start: 2024-07-03 | End: 2024-07-09

## 2024-07-03 RX ORDER — DEXTROSE 30 % IN WATER 30 %
12.5 VIAL (ML) INTRAVENOUS ONCE
Refills: 0 | Status: DISCONTINUED | OUTPATIENT
Start: 2024-07-03 | End: 2024-07-09

## 2024-07-03 RX ORDER — SERTRALINE HYDROCHLORIDE 100 MG/1
25 TABLET, FILM COATED ORAL DAILY
Refills: 0 | Status: DISCONTINUED | OUTPATIENT
Start: 2024-07-03 | End: 2024-07-09

## 2024-07-03 RX ORDER — INSULIN LISPRO 100 [IU]/ML
INJECTION, SOLUTION SUBCUTANEOUS
Refills: 0 | Status: DISCONTINUED | OUTPATIENT
Start: 2024-07-03 | End: 2024-07-09

## 2024-07-03 RX ORDER — POVIDONE-IODINE
1 FLAKES (GRAM) MISCELLANEOUS DAILY
Refills: 0 | Status: DISCONTINUED | OUTPATIENT
Start: 2024-07-03 | End: 2024-07-09

## 2024-07-03 RX ADMIN — Medication 20 MILLIGRAM(S): at 13:36

## 2024-07-03 RX ADMIN — Medication 12.5 MILLIGRAM(S): at 17:33

## 2024-07-03 RX ADMIN — Medication 600 MILLIGRAM(S): at 03:00

## 2024-07-03 RX ADMIN — INSULIN GLARGINE 5 UNIT(S): 100 INJECTION, SOLUTION SUBCUTANEOUS at 22:24

## 2024-07-03 RX ADMIN — Medication 25 MILLIGRAM(S): at 05:37

## 2024-07-03 RX ADMIN — Medication 1 CAPSULE(S): at 09:14

## 2024-07-03 RX ADMIN — MEMANTINE HYDROCHLORIDE 10 MILLIGRAM(S): 7 CAPSULE, EXTENDED RELEASE ORAL at 17:33

## 2024-07-03 RX ADMIN — Medication 1 CAPSULE(S): at 17:34

## 2024-07-03 RX ADMIN — PANTOPRAZOLE SODIUM 40 MILLIGRAM(S): 40 INJECTION, POWDER, FOR SOLUTION INTRAVENOUS at 05:37

## 2024-07-03 RX ADMIN — Medication 25 MILLIGRAM(S): at 17:34

## 2024-07-03 RX ADMIN — MEMANTINE HYDROCHLORIDE 10 MILLIGRAM(S): 7 CAPSULE, EXTENDED RELEASE ORAL at 05:37

## 2024-07-03 RX ADMIN — INSULIN LISPRO 2: 100 INJECTION, SOLUTION SUBCUTANEOUS at 13:35

## 2024-07-03 RX ADMIN — POTASSIUM CHLORIDE 40 MILLIEQUIVALENT(S): 600 TABLET, FILM COATED, EXTENDED RELEASE ORAL at 13:37

## 2024-07-03 RX ADMIN — ENOXAPARIN SODIUM 40 MILLIGRAM(S): 100 INJECTION SUBCUTANEOUS at 05:36

## 2024-07-03 RX ADMIN — DOXAZOSIN MESYLATE 2 MILLIGRAM(S): 2 TABLET ORAL at 22:23

## 2024-07-03 RX ADMIN — SERTRALINE HYDROCHLORIDE 25 MILLIGRAM(S): 100 TABLET, FILM COATED ORAL at 13:36

## 2024-07-03 RX ADMIN — Medication 12.5 MILLIGRAM(S): at 05:37

## 2024-07-03 RX ADMIN — INSULIN LISPRO 2: 100 INJECTION, SOLUTION SUBCUTANEOUS at 17:34

## 2024-07-03 RX ADMIN — LORATADINE 10 MILLIGRAM(S): 10 TABLET ORAL at 13:36

## 2024-07-03 RX ADMIN — ASPIRIN 81 MILLIGRAM(S): 325 TABLET, FILM COATED ORAL at 13:36

## 2024-07-03 RX ADMIN — FUROSEMIDE 40 MILLIGRAM(S): 10 INJECTION, SOLUTION INTRAMUSCULAR; INTRAVENOUS at 05:36

## 2024-07-03 RX ADMIN — FUROSEMIDE 40 MILLIGRAM(S): 10 INJECTION, SOLUTION INTRAMUSCULAR; INTRAVENOUS at 13:35

## 2024-07-03 NOTE — CONSULT NOTE ADULT - SUBJECTIVE AND OBJECTIVE BOX
CARDIOLOGY CONSULT - Dr. Phelps         HPI:  83 y/o female w/ PMHx CAD s/p PCI, IDDM2, 2/2 Whipple procedure for pancreatic CA, dementia (baseline AAOx1-2, bedbound, nonverbal) presents via EMS today for 8-10 days worth of  LE swelling. On 2L NC, baseline no oxygen. Caretaker notes that pt has b/l LE swelling, upper b/l arms R>L have been more swollen than usual, and possible abdominal swelling. Notes that pt occasionally has episodes that seems like pt is wheezing for air. Pt was sent into today for by PCP after son in law called 911 after abnormal lab results with a BNP on 6/28 of 2826. Pt denies PND, orthopnea, dysuria, rashes, trauma, dietary changes, trauma, recent travel or infectious contacts, hx of smoking, asthma, copd. (02 Jul 2024 17:53)      PAST MEDICAL & SURGICAL HISTORY:  CAD (coronary artery disease)      Dementia      Pancreatic cancer      Diabetes mellitus      GERD (gastroesophageal reflux disease)      H/O Whipple procedure      PREVIOUS DIAGNOSTIC TESTING:    [ ] Echocardiogram:  [ ]  Catheterization:  [ ] Stress Test:  	    MEDICATIONS:  Home Medications:  aspirin 81 mg oral delayed release tablet: 1 tab(s) orally once a day (02 Jul 2024 19:15)  Creon 24,000 units oral delayed release capsule: 1 cap(s) orally 2 times a day (before meals) (02 Jul 2024 19:17)  doxazosin 2 mg oral tablet: 1 tab(s) orally once a day (at bedtime) (02 Jul 2024 19:17)  famotidine 20 mg oral tablet: 1 tab(s) orally once a day (02 Jul 2024 19:17)  HumaLOG 100 units/mL subcutaneous solution: 2 unit(s) subcutaneous 3 times a day (before meals) as per sliding scale (02 Jul 2024 19:17)  Lantus 100 units/mL subcutaneous solution: 4 unit(s) subcutaneous once a day (02 Jul 2024 19:17)  loratadine 10 mg oral tablet: 1 tab(s) orally once a day (02 Jul 2024 19:14)  methenamine hippurate 1 g oral tablet: 1 tab(s) orally 2 times a day (02 Jul 2024 19:17)  metoprolol tartrate 25 mg oral tablet: 0.5 tab(s) orally 2 times a day (02 Jul 2024 19:17)  Namenda 10 mg oral tablet: 1 tab(s) orally 2 times a day (02 Jul 2024 19:17)  NexIUM 24HR 20 mg oral delayed release tablet: 1 tab(s) orally once a day (02 Jul 2024 19:14)  otc supplement(s): calcium 1g / vitamin d3 1000IU / ferrous sulfate 325mg / vitamin c 100mg / vitamin b12 1000mcg (02 Jul 2024 19:14)  QUEtiapine 25 mg oral tablet: 1 tab(s) orally 2 times a day (02 Jul 2024 19:17)  sertraline 25 mg oral tablet: 1 tab(s) orally once a day (at bedtime) (02 Jul 2024 19:17)      MEDICATIONS  (STANDING):  aspirin enteric coated 81 milliGRAM(s) Oral daily  dextrose 10% Bolus 125 milliLiter(s) IV Bolus once  dextrose 5%. 1000 milliLiter(s) (50 mL/Hr) IV Continuous <Continuous>  dextrose 5%. 1000 milliLiter(s) (100 mL/Hr) IV Continuous <Continuous>  dextrose 50% Injectable 25 Gram(s) IV Push once  dextrose 50% Injectable 12.5 Gram(s) IV Push once  doxazosin 2 milliGRAM(s) Oral at bedtime  enoxaparin Injectable 40 milliGRAM(s) SubCutaneous every 24 hours  famotidine    Tablet 20 milliGRAM(s) Oral daily  furosemide   Injectable 40 milliGRAM(s) IV Push two times a day  glucagon  Injectable 1 milliGRAM(s) IntraMuscular once  insulin glargine Injectable (LANTUS) 5 Unit(s) SubCutaneous at bedtime  insulin lispro (ADMELOG) corrective regimen sliding scale   SubCutaneous at bedtime  insulin lispro (ADMELOG) corrective regimen sliding scale   SubCutaneous three times a day before meals  loratadine 10 milliGRAM(s) Oral daily  memantine 10 milliGRAM(s) Oral two times a day  metoprolol tartrate 12.5 milliGRAM(s) Oral two times a day  pancrelipase  (CREON 24,000 Lipase Units) 1 Capsule(s) Oral two times a day with meals  pantoprazole    Tablet 40 milliGRAM(s) Oral before breakfast  QUEtiapine 25 milliGRAM(s) Oral two times a day  sertraline 25 milliGRAM(s) Oral daily      FAMILY HISTORY:      SOCIAL HISTORY:    [x] Non-smoker  [ ] Smoker  [ ] Alcohol    Allergies    No Known Allergies    Intolerances    	    REVIEW OF SYSTEMS:  CONSTITUTIONAL: No fever, weight loss, or fatigue  EYES: No eye pain, visual disturbances, or discharge  ENMT:  No difficulty hearing, tinnitus, vertigo; No sinus or throat pain  NECK: No pain or stiffness  RESPIRATORY: No cough, wheezing, chills or hemoptysis; No Shortness of Breath  CARDIOVASCULAR: No chest pain, palpitations, passing out, dizziness, or leg swelling  GASTROINTESTINAL: No abdominal or epigastric pain. No nausea, vomiting, or hematemesis; No diarrhea or constipation. No melena or hematochezia.  GENITOURINARY: No dysuria, frequency, hematuria, or incontinence  NEUROLOGICAL: No headaches, memory loss, loss of strength, numbness, or tremors  SKIN: No itching, burning, rashes, or lesions   	    [ ] All others negative	  [x] Unable to obtain    PHYSICAL EXAM:  T(C): 36.6 (07-03-24 @ 11:54), Max: 37.2 (07-02-24 @ 15:44)  HR: 92 (07-03-24 @ 11:54) (71 - 100)  BP: 141/71 (07-03-24 @ 11:54) (111/74 - 141/71)  RR: 19 (07-03-24 @ 11:54) (18 - 19)  SpO2: 97% (07-03-24 @ 11:54) (96% - 100%)  Wt(kg): --  I&O's Summary      Appearance: Elderly female 	  Psychiatry: A & O x 3, Mood & affect appropriate  HEENT:   Normal oral mucosa, PERRL, EOMI	  Lymphatic: No lymphadenopathy  Cardiovascular: Normal S1 S2,RRR, No JVD, No murmurs  Respiratory: Lungs clear to auscultation anteriorly b/l   Gastrointestinal:  Soft, Non-tender, + BS	  Skin: No rashes, No ecchymoses, No cyanosis	  Neurologic: Non-focal  Extremities: Normal range of motion, No clubbing, cyanosis. +trace b/l le edema, +RUE edema   Vascular: Peripheral pulses palpable 2+ bilaterally    TELEMETRY: 	    ECG:  NSR 79bpm  RADIOLOGY:  < from: Xray Chest 1 View- PORTABLE-Urgent (07.02.24 @ 14:28) >  IMPRESSION:  Bibasilar atelectasis with linear atelectasis in left midlung.    --- End of Report ---    < end of copied text >      OTHER: 	  	  LABS:	 	    CARDIAC MARKERS:  Troponin T, High Sensitivity Result: 63 ng/L (07-02 @ 15:51)  Troponin T, High Sensitivity Result: 64 ng/L (07-02 @ 13:57)                                  10.4   10.11 )-----------( 336      ( 03 Jul 2024 09:43 )             32.8     07-03    137  |  99  |  14  ----------------------------<  179<H>  3.2<L>   |  26  |  0.40<L>    Ca    8.0<L>      03 Jul 2024 09:43    TPro  5.3<L>  /  Alb  2.0<L>  /  TBili  0.5  /  DBili  x   /  AST  26  /  ALT  22  /  AlkPhos  107  07-02      proBNP:   Lipid Profile:   HgA1c:   TSH:

## 2024-07-03 NOTE — PATIENT PROFILE ADULT - FALL HARM RISK - HARM RISK INTERVENTIONS

## 2024-07-03 NOTE — CONSULT NOTE ADULT - SUBJECTIVE AND OBJECTIVE BOX
Wound SURGERY CONSULT NOTE    HPI:  83 y/o female w/ PMHx CAD s/p PCI, IDDM2, 2/2 Whipple procedure for pancreatic CA, dementia (baseline AAOx1-2, bedbound, nonverbal) presents via EMS today for 8-10 days worth of  LE swelling. On 2L NC, baseline no oxygen. Caretaker notes that pt has b/l LE swelling, upper b/l arms R>L have been more swollen than usual, and possible abdominal swelling. Notes that pt occasionally has episodes that seems like pt is wheezing for air. Pt was sent into today for by PCP after son in law called 911 after abnormal lab results with a BNP on 6/28 of 2826. Pt denies PND, orthopnea, dysuria, rashes, trauma, dietary changes, trauma, recent travel or infectious contacts, hx of smoking, asthma, copd. (02 Jul 2024 17:53)    Wound consult requested by team to assist w/ management of multiple pressure injuries.   Pt unable to  c/o pain, drainage, odor, color change,  or worsening swelling. Offloading and pericare initiated upon admission as pt Increasingly sedentary 2/2 to illness. Pt is Incontinent of urine & stool. (+) primafit. Appetite decreased.    Current Diet:     PAST MEDICAL & SURGICAL HISTORY:  CAD (coronary artery disease)      Dementia      Pancreatic cancer      Diabetes mellitus      GERD (gastroesophageal reflux disease)      H/O Whipple procedure          REVIEW OF SYSTEMS: Pt unable to offer      MEDICATIONS  (STANDING):  aspirin enteric coated 81 milliGRAM(s) Oral daily  dextrose 10% Bolus 125 milliLiter(s) IV Bolus once  dextrose 5%. 1000 milliLiter(s) (50 mL/Hr) IV Continuous <Continuous>  dextrose 5%. 1000 milliLiter(s) (100 mL/Hr) IV Continuous <Continuous>  dextrose 50% Injectable 25 Gram(s) IV Push once  dextrose 50% Injectable 12.5 Gram(s) IV Push once  doxazosin 2 milliGRAM(s) Oral at bedtime  enoxaparin Injectable 40 milliGRAM(s) SubCutaneous every 24 hours  famotidine    Tablet 20 milliGRAM(s) Oral daily  furosemide   Injectable 40 milliGRAM(s) IV Push two times a day  glucagon  Injectable 1 milliGRAM(s) IntraMuscular once  insulin glargine Injectable (LANTUS) 5 Unit(s) SubCutaneous at bedtime  insulin lispro (ADMELOG) corrective regimen sliding scale   SubCutaneous three times a day before meals  insulin lispro (ADMELOG) corrective regimen sliding scale   SubCutaneous at bedtime  loratadine 10 milliGRAM(s) Oral daily  memantine 10 milliGRAM(s) Oral two times a day  metoprolol tartrate 12.5 milliGRAM(s) Oral two times a day  pancrelipase  (CREON 24,000 Lipase Units) 1 Capsule(s) Oral two times a day with meals  pantoprazole    Tablet 40 milliGRAM(s) Oral before breakfast  povidone iodine 10% Solution 1 Application(s) Topical daily  QUEtiapine 25 milliGRAM(s) Oral two times a day  sertraline 25 milliGRAM(s) Oral daily    MEDICATIONS  (PRN):  dextrose Oral Gel 15 Gram(s) Oral once PRN Blood Glucose LESS THAN 70 milliGRAM(s)/deciliter      Allergies    No Known Allergies    Intolerances        SOCIAL HISTORY:  , No hx of smoking, ETOH, drugs    FAMILY HISTORY:   No pertinent family hx among first degree relatives.    PHYSICAL EXAM:  Vital Signs Last 24 Hrs  T(C): 36.6 (03 Jul 2024 11:54), Max: 36.9 (03 Jul 2024 05:23)  T(F): 97.8 (03 Jul 2024 11:54), Max: 98.4 (03 Jul 2024 05:23)  HR: 92 (03 Jul 2024 11:54) (71 - 100)  BP: 141/71 (03 Jul 2024 11:54) (111/74 - 141/71)  BP(mean): 85 (02 Jul 2024 20:29) (85 - 85)  RR: 19 (03 Jul 2024 11:54) (18 - 19)  SpO2: 97% (03 Jul 2024 11:54) (96% - 100%)    Parameters below as of 03 Jul 2024 11:54  Patient On (Oxygen Delivery Method): room air        NAD/ Alert/ / thin/frail,  Versa Care P500   HEENT:  sclera clear, mucosa moist, throat clear, trachea midline, neck supple  Respiratory: nonlabored w/ equal chest rise  Gastrointestinal: soft NT/ND  : (+) purewick  Neurology:  nonverbal, can not follow commands but understands commands  Psych: calm/ appropriate  Musculoskeletal:  contracted  Vascular: BLE equally warm/   no cyanosis, clubbing, nor acute ischemia           BLE edema equal         Rt heel hard dry stable eschar 2.0cm x 2.0cm x0.0 cm periwound with erythema            No blistering, no drainage            no increased warmth, induration, fluctuance, nor crepitus         Lt heel serous filled blister 1.0cm x 1.0cm x 0.0cm             no drainage, no increased warmth, induration, fluctuance, nor crepitus        LLE lateral wound 3.0cm x 1.5cm x 0.1cm with soft eschar there is scant drainage         no increased warmth, induration, fluctuance, nor crepitus  Skin: thin, dry, pale, frail,      mid back area of deep dark persistent discoloration no erythema       (+) fluctuant, no drainage, no increase in warmth, no induration     sacral region area of deep dark purple discoloration        there is no blistering, drainage, no erythema, increased warmth, induration, fluctuance, nor crepitus    LABS/ CULTURES/ RADIOLOGY:                        10.4   10.11 )-----------( 336 ( 03 Jul 2024 09:43 )             32.8       137  |  99  |  14  ----------------------------<  179      [07-03-24 @ 09:43]  3.2   |  26  |  0.40        Ca     8.0     [07-03-24 @ 09:43]    TPro  5.3  /  Alb  2.0  /  TBili  0.5  /  DBili  x   /  AST  26  /  ALT  22  /  AlkPhos  107  [07-02-24 @ 13:57]            A1C with Estimated Average Glucose Result: 6.1 % (04-05-24 @ 07:18)

## 2024-07-03 NOTE — CONSULT NOTE ADULT - TIME BILLING
agree with above  85 y/o female w/ PMHx CAD s/p PCI, IDDM2, 2/2 Whipple procedure for pancreatic CA, dementia (baseline AAOx1-2, bedbound, nonverbal) presents via EMS today for 8-10 days worth of LE swelling.    #LE Swelling  -Trace le edema b/l  -Check le dopplers to r/o dvt  -Can continue iv lasix as ordered, however may not be helpful i/s/o low albumin  -Cont to trend renal fxn/electrolytes while on diuretic  -Check echo  -Replete K+    #CAD sp PCI  -HST mild elevation - peaked at 64  -ECG SR no acute ischemic findings  -Cont asa    #AMS  -Infectious w/u per med        dvt ppx

## 2024-07-03 NOTE — PATIENT PROFILE ADULT - STATED REASON FOR ADMISSION
Blood work done on Friday showed Hemoglobin 9 and BNP high.  Podiatrist recommended to come in due to swollen feet

## 2024-07-03 NOTE — PROGRESS NOTE ADULT - SUBJECTIVE AND OBJECTIVE BOX
Patient is a 84y old  Female who presents with a chief complaint of     SUBJECTIVE / OVERNIGHT EVENTS: ptn appears comfortable, she is nonverbal, ate 10% of her pureed diet tray    MEDICATIONS  (STANDING):  aspirin enteric coated 81 milliGRAM(s) Oral daily  dextrose 10% Bolus 125 milliLiter(s) IV Bolus once  dextrose 5%. 1000 milliLiter(s) (50 mL/Hr) IV Continuous <Continuous>  dextrose 5%. 1000 milliLiter(s) (100 mL/Hr) IV Continuous <Continuous>  dextrose 50% Injectable 25 Gram(s) IV Push once  dextrose 50% Injectable 12.5 Gram(s) IV Push once  doxazosin 2 milliGRAM(s) Oral at bedtime  enoxaparin Injectable 40 milliGRAM(s) SubCutaneous every 24 hours  famotidine    Tablet 20 milliGRAM(s) Oral daily  furosemide   Injectable 40 milliGRAM(s) IV Push two times a day  glucagon  Injectable 1 milliGRAM(s) IntraMuscular once  insulin glargine Injectable (LANTUS) 5 Unit(s) SubCutaneous at bedtime  insulin lispro (ADMELOG) corrective regimen sliding scale   SubCutaneous at bedtime  insulin lispro (ADMELOG) corrective regimen sliding scale   SubCutaneous three times a day before meals  loratadine 10 milliGRAM(s) Oral daily  memantine 10 milliGRAM(s) Oral two times a day  metoprolol tartrate 12.5 milliGRAM(s) Oral two times a day  pancrelipase  (CREON 24,000 Lipase Units) 1 Capsule(s) Oral two times a day with meals  pantoprazole    Tablet 40 milliGRAM(s) Oral before breakfast  povidone iodine 10% Solution 1 Application(s) Topical daily  QUEtiapine 25 milliGRAM(s) Oral two times a day  sertraline 25 milliGRAM(s) Oral daily    MEDICATIONS  (PRN):  acetaminophen     Tablet .. 650 milliGRAM(s) Oral every 6 hours PRN Mild Pain (1 - 3)  dextrose Oral Gel 15 Gram(s) Oral once PRN Blood Glucose LESS THAN 70 milliGRAM(s)/deciliter      Vital Signs Last 24 Hrs  T(F): 97.8 (07-03-24 @ 11:54), Max: 98.4 (07-03-24 @ 05:23)  HR: 92 (07-03-24 @ 11:54) (71 - 100)  BP: 141/71 (07-03-24 @ 11:54) (111/74 - 141/71)  RR: 19 (07-03-24 @ 11:54) (18 - 19)  SpO2: 97% (07-03-24 @ 11:54) (96% - 100%)  Telemetry:   CAPILLARY BLOOD GLUCOSE      POCT Blood Glucose.: 229 mg/dL (03 Jul 2024 17:32)  POCT Blood Glucose.: 225 mg/dL (03 Jul 2024 13:05)  POCT Blood Glucose.: 117 mg/dL (03 Jul 2024 08:07)    I&O's Summary    03 Jul 2024 07:01  -  03 Jul 2024 20:06  --------------------------------------------------------  IN: 120 mL / OUT: 500 mL / NET: -380 mL        PHYSICAL EXAM:  GENERAL: NAD, well-developed  HEAD:  Atraumatic, Normocephalic  EYES: EOMI, PERRLA, conjunctiva and sclera clear  NECK: Supple, No JVD  CHEST/LUNG: Clear to auscultation bilaterally; No wheeze  HEART: Regular rate and rhythm; No murmurs, rubs, or gallops  ABDOMEN: Soft, Nontender, Nondistended; Bowel sounds present  EXTREMITIES:  2+ Peripheral Pulses, No clubbing, cyanosis, or edema  PSYCH: AAOx3  NEUROLOGY: non-focal  SKIN: No rashes or lesions    LABS:                        10.4   10.11 )-----------( 336      ( 03 Jul 2024 09:43 )             32.8     07-03    137  |  99  |  14  ----------------------------<  179<H>  3.2<L>   |  26  |  0.40<L>    Ca    8.0<L>      03 Jul 2024 09:43    TPro  5.3<L>  /  Alb  2.0<L>  /  TBili  0.5  /  DBili  x   /  AST  26  /  ALT  22  /  AlkPhos  107  07-02          Urinalysis Basic - ( 03 Jul 2024 09:43 )    Color: x / Appearance: x / SG: x / pH: x  Gluc: 179 mg/dL / Ketone: x  / Bili: x / Urobili: x   Blood: x / Protein: x / Nitrite: x   Leuk Esterase: x / RBC: x / WBC x   Sq Epi: x / Non Sq Epi: x / Bacteria: x        RADIOLOGY & ADDITIONAL TESTS:    Imaging Personally Reviewed:    Consultant(s) Notes Reviewed:      Care Discussed with Consultants/Other Providers:

## 2024-07-04 LAB
A1C WITH ESTIMATED AVERAGE GLUCOSE RESULT: 6.4 % — HIGH (ref 4–5.6)
ANION GAP SERPL CALC-SCNC: 9 MMOL/L — SIGNIFICANT CHANGE UP (ref 5–17)
BUN SERPL-MCNC: 14 MG/DL — SIGNIFICANT CHANGE UP (ref 7–23)
CALCIUM SERPL-MCNC: 8.2 MG/DL — LOW (ref 8.4–10.5)
CHLORIDE SERPL-SCNC: 99 MMOL/L — SIGNIFICANT CHANGE UP (ref 96–108)
CO2 SERPL-SCNC: 30 MMOL/L — SIGNIFICANT CHANGE UP (ref 22–31)
CREAT SERPL-MCNC: 0.39 MG/DL — LOW (ref 0.5–1.3)
EGFR: 97 ML/MIN/1.73M2 — SIGNIFICANT CHANGE UP
ESTIMATED AVERAGE GLUCOSE: 137 MG/DL — HIGH (ref 68–114)
GLUCOSE BLDC GLUCOMTR-MCNC: 130 MG/DL — HIGH (ref 70–99)
GLUCOSE BLDC GLUCOMTR-MCNC: 149 MG/DL — HIGH (ref 70–99)
GLUCOSE BLDC GLUCOMTR-MCNC: 186 MG/DL — HIGH (ref 70–99)
GLUCOSE BLDC GLUCOMTR-MCNC: 88 MG/DL — SIGNIFICANT CHANGE UP (ref 70–99)
GLUCOSE SERPL-MCNC: 73 MG/DL — SIGNIFICANT CHANGE UP (ref 70–99)
HCT VFR BLD CALC: 31.3 % — LOW (ref 34.5–45)
HGB BLD-MCNC: 10 G/DL — LOW (ref 11.5–15.5)
MCHC RBC-ENTMCNC: 28.3 PG — SIGNIFICANT CHANGE UP (ref 27–34)
MCHC RBC-ENTMCNC: 31.9 GM/DL — LOW (ref 32–36)
MCV RBC AUTO: 88.7 FL — SIGNIFICANT CHANGE UP (ref 80–100)
NRBC # BLD: 0 /100 WBCS — SIGNIFICANT CHANGE UP (ref 0–0)
PLATELET # BLD AUTO: 279 K/UL — SIGNIFICANT CHANGE UP (ref 150–400)
POTASSIUM SERPL-MCNC: 3.3 MMOL/L — LOW (ref 3.5–5.3)
POTASSIUM SERPL-SCNC: 3.3 MMOL/L — LOW (ref 3.5–5.3)
RBC # BLD: 3.53 M/UL — LOW (ref 3.8–5.2)
RBC # FLD: 16.5 % — HIGH (ref 10.3–14.5)
SODIUM SERPL-SCNC: 138 MMOL/L — SIGNIFICANT CHANGE UP (ref 135–145)
WBC # BLD: 8.45 K/UL — SIGNIFICANT CHANGE UP (ref 3.8–10.5)
WBC # FLD AUTO: 8.45 K/UL — SIGNIFICANT CHANGE UP (ref 3.8–10.5)

## 2024-07-04 RX ORDER — POTASSIUM CHLORIDE 600 MG/1
40 TABLET, FILM COATED, EXTENDED RELEASE ORAL ONCE
Refills: 0 | Status: COMPLETED | OUTPATIENT
Start: 2024-07-04 | End: 2024-07-04

## 2024-07-04 RX ORDER — POTASSIUM CHLORIDE 600 MG/1
40 TABLET, FILM COATED, EXTENDED RELEASE ORAL ONCE
Refills: 0 | Status: DISCONTINUED | OUTPATIENT
Start: 2024-07-04 | End: 2024-07-04

## 2024-07-04 RX ADMIN — INSULIN GLARGINE 5 UNIT(S): 100 INJECTION, SOLUTION SUBCUTANEOUS at 21:31

## 2024-07-04 RX ADMIN — MEMANTINE HYDROCHLORIDE 10 MILLIGRAM(S): 7 CAPSULE, EXTENDED RELEASE ORAL at 18:00

## 2024-07-04 RX ADMIN — FUROSEMIDE 40 MILLIGRAM(S): 10 INJECTION, SOLUTION INTRAMUSCULAR; INTRAVENOUS at 06:13

## 2024-07-04 RX ADMIN — Medication 1 APPLICATION(S): at 13:07

## 2024-07-04 RX ADMIN — DOXAZOSIN MESYLATE 2 MILLIGRAM(S): 2 TABLET ORAL at 21:36

## 2024-07-04 RX ADMIN — Medication 12.5 MILLIGRAM(S): at 06:12

## 2024-07-04 RX ADMIN — Medication 25 MILLIGRAM(S): at 06:12

## 2024-07-04 RX ADMIN — Medication 650 MILLIGRAM(S): at 13:31

## 2024-07-04 RX ADMIN — APIXABAN 2.5 MILLIGRAM(S): 5 TABLET, FILM COATED ORAL at 06:12

## 2024-07-04 RX ADMIN — MEMANTINE HYDROCHLORIDE 10 MILLIGRAM(S): 7 CAPSULE, EXTENDED RELEASE ORAL at 06:13

## 2024-07-04 RX ADMIN — LORATADINE 10 MILLIGRAM(S): 10 TABLET ORAL at 13:01

## 2024-07-04 RX ADMIN — Medication 650 MILLIGRAM(S): at 13:01

## 2024-07-04 RX ADMIN — ASPIRIN 81 MILLIGRAM(S): 325 TABLET, FILM COATED ORAL at 13:02

## 2024-07-04 RX ADMIN — PANTOPRAZOLE SODIUM 40 MILLIGRAM(S): 40 INJECTION, POWDER, FOR SOLUTION INTRAVENOUS at 06:12

## 2024-07-04 RX ADMIN — Medication 25 MILLIGRAM(S): at 18:00

## 2024-07-04 RX ADMIN — APIXABAN 2.5 MILLIGRAM(S): 5 TABLET, FILM COATED ORAL at 18:00

## 2024-07-04 RX ADMIN — SERTRALINE HYDROCHLORIDE 25 MILLIGRAM(S): 100 TABLET, FILM COATED ORAL at 13:01

## 2024-07-04 RX ADMIN — Medication 1 CAPSULE(S): at 18:00

## 2024-07-04 RX ADMIN — POTASSIUM CHLORIDE 40 MILLIEQUIVALENT(S): 600 TABLET, FILM COATED, EXTENDED RELEASE ORAL at 09:05

## 2024-07-04 RX ADMIN — Medication 1 CAPSULE(S): at 09:04

## 2024-07-04 RX ADMIN — FUROSEMIDE 40 MILLIGRAM(S): 10 INJECTION, SOLUTION INTRAMUSCULAR; INTRAVENOUS at 13:07

## 2024-07-04 RX ADMIN — Medication 12.5 MILLIGRAM(S): at 17:59

## 2024-07-04 RX ADMIN — Medication 20 MILLIGRAM(S): at 13:01

## 2024-07-04 NOTE — PROGRESS NOTE ADULT - SUBJECTIVE AND OBJECTIVE BOX
CARDIOLOGY FOLLOW UP - Dr. Phelps  Date of Service: 7/4/24  CC: brief PAT on tele noted,no other events    Review of Systems:  Constitutional: No fever, weight loss, or fatigue  Respiratory: No cough, wheezing, or hemoptysis, no shortness of breath  Cardiovascular: No chest pain, palpitations, passing out, dizziness, or leg swelling  Gastrointestinal: No abd or epigastric pain. No nausea, vomiting, or hematemesis; no diarrhea or consiptaiton, no melena or hematochezia  Vascular: No edema     TELEMETRY:    PHYSICAL EXAM:  T(C): 36.6 (07-04-24 @ 04:46), Max: 37.1 (07-03-24 @ 21:24)  HR: 91 (07-04-24 @ 04:46) (84 - 92)  BP: 107/72 (07-04-24 @ 04:46) (107/72 - 141/71)  RR: 18 (07-04-24 @ 04:46) (18 - 19)  SpO2: 92% (07-04-24 @ 04:46) (92% - 97%)  Wt(kg): --  I&O's Summary    03 Jul 2024 07:01  -  04 Jul 2024 06:55  --------------------------------------------------------  IN: 120 mL / OUT: 640 mL / NET: -520 mL        Appearance: Normal	  Cardiovascular: Normal S1 S2,RRR, No JVD, No murmurs  Respiratory: Lungs clear to auscultation	  Gastrointestinal:  Soft, Non-tender, + BS	  Extremities: Normal range of motion, No clubbing, cyanosis or edema  Vascular: Peripheral pulses palpable 2+ bilaterally       Home Medications:  aspirin 81 mg oral delayed release tablet: 1 tab(s) orally once a day (02 Jul 2024 19:15)  Creon 24,000 units oral delayed release capsule: 1 cap(s) orally 2 times a day (before meals) (02 Jul 2024 19:17)  doxazosin 2 mg oral tablet: 1 tab(s) orally once a day (at bedtime) (02 Jul 2024 19:17)  famotidine 20 mg oral tablet: 1 tab(s) orally once a day (02 Jul 2024 19:17)  HumaLOG 100 units/mL subcutaneous solution: 2 unit(s) subcutaneous 3 times a day (before meals) as per sliding scale (02 Jul 2024 19:17)  Lantus 100 units/mL subcutaneous solution: 4 unit(s) subcutaneous once a day (02 Jul 2024 19:17)  loratadine 10 mg oral tablet: 1 tab(s) orally once a day (02 Jul 2024 19:14)  methenamine hippurate 1 g oral tablet: 1 tab(s) orally 2 times a day (02 Jul 2024 19:17)  metoprolol tartrate 25 mg oral tablet: 0.5 tab(s) orally 2 times a day (02 Jul 2024 19:17)  Namenda 10 mg oral tablet: 1 tab(s) orally 2 times a day (02 Jul 2024 19:17)  NexIUM 24HR 20 mg oral delayed release tablet: 1 tab(s) orally once a day (02 Jul 2024 19:14)  otc supplement(s): calcium 1g / vitamin d3 1000IU / ferrous sulfate 325mg / vitamin c 100mg / vitamin b12 1000mcg (02 Jul 2024 19:14)  QUEtiapine 25 mg oral tablet: 1 tab(s) orally 2 times a day (02 Jul 2024 19:17)  sertraline 25 mg oral tablet: 1 tab(s) orally once a day (at bedtime) (02 Jul 2024 19:17)        MEDICATIONS  (STANDING):  apixaban 2.5 milliGRAM(s) Oral every 12 hours  aspirin enteric coated 81 milliGRAM(s) Oral daily  dextrose 10% Bolus 125 milliLiter(s) IV Bolus once  dextrose 5%. 1000 milliLiter(s) (50 mL/Hr) IV Continuous <Continuous>  dextrose 5%. 1000 milliLiter(s) (100 mL/Hr) IV Continuous <Continuous>  dextrose 50% Injectable 25 Gram(s) IV Push once  dextrose 50% Injectable 12.5 Gram(s) IV Push once  doxazosin 2 milliGRAM(s) Oral at bedtime  famotidine    Tablet 20 milliGRAM(s) Oral daily  furosemide   Injectable 40 milliGRAM(s) IV Push two times a day  glucagon  Injectable 1 milliGRAM(s) IntraMuscular once  insulin glargine Injectable (LANTUS) 5 Unit(s) SubCutaneous at bedtime  insulin lispro (ADMELOG) corrective regimen sliding scale   SubCutaneous three times a day before meals  insulin lispro (ADMELOG) corrective regimen sliding scale   SubCutaneous at bedtime  loratadine 10 milliGRAM(s) Oral daily  memantine 10 milliGRAM(s) Oral two times a day  metoprolol tartrate 12.5 milliGRAM(s) Oral two times a day  pancrelipase  (CREON 24,000 Lipase Units) 1 Capsule(s) Oral two times a day with meals  pantoprazole    Tablet 40 milliGRAM(s) Oral before breakfast  povidone iodine 10% Solution 1 Application(s) Topical daily  QUEtiapine 25 milliGRAM(s) Oral two times a day  sertraline 25 milliGRAM(s) Oral daily        EKG:  RADIOLOGY:  DIAGNOSTIC TESTING:  [ ] Echocardiogram:  [ ] Catherterization:  [ ] Stress Test:  OTHER:     LABS:	 	                          10.4   10.11 )-----------( 336      ( 03 Jul 2024 09:43 )             32.8     07-03    137  |  99  |  14  ----------------------------<  179<H>  3.2<L>   |  26  |  0.40<L>    Ca    8.0<L>      03 Jul 2024 09:43    TPro  5.3<L>  /  Alb  2.0<L>  /  TBili  0.5  /  DBili  x   /  AST  26  /  ALT  22  /  AlkPhos  107  07-02          CARDIAC MARKERS:

## 2024-07-04 NOTE — PROGRESS NOTE ADULT - SUBJECTIVE AND OBJECTIVE BOX
Patient is a 84y old  Female who presents with a chief complaint of     SUBJECTIVE / OVERNIGHT EVENTS:    MEDICATIONS  (STANDING):  apixaban 2.5 milliGRAM(s) Oral every 12 hours  aspirin enteric coated 81 milliGRAM(s) Oral daily  dextrose 10% Bolus 125 milliLiter(s) IV Bolus once  dextrose 5%. 1000 milliLiter(s) (50 mL/Hr) IV Continuous <Continuous>  dextrose 5%. 1000 milliLiter(s) (100 mL/Hr) IV Continuous <Continuous>  dextrose 50% Injectable 25 Gram(s) IV Push once  dextrose 50% Injectable 12.5 Gram(s) IV Push once  doxazosin 2 milliGRAM(s) Oral at bedtime  famotidine    Tablet 20 milliGRAM(s) Oral daily  furosemide   Injectable 40 milliGRAM(s) IV Push two times a day  glucagon  Injectable 1 milliGRAM(s) IntraMuscular once  insulin glargine Injectable (LANTUS) 5 Unit(s) SubCutaneous at bedtime  insulin lispro (ADMELOG) corrective regimen sliding scale   SubCutaneous at bedtime  insulin lispro (ADMELOG) corrective regimen sliding scale   SubCutaneous three times a day before meals  loratadine 10 milliGRAM(s) Oral daily  memantine 10 milliGRAM(s) Oral two times a day  metoprolol tartrate 12.5 milliGRAM(s) Oral two times a day  pancrelipase  (CREON 24,000 Lipase Units) 1 Capsule(s) Oral two times a day with meals  pantoprazole    Tablet 40 milliGRAM(s) Oral before breakfast  povidone iodine 10% Solution 1 Application(s) Topical daily  QUEtiapine 25 milliGRAM(s) Oral two times a day  sertraline 25 milliGRAM(s) Oral daily    MEDICATIONS  (PRN):  acetaminophen     Tablet .. 650 milliGRAM(s) Oral every 6 hours PRN Mild Pain (1 - 3)  dextrose Oral Gel 15 Gram(s) Oral once PRN Blood Glucose LESS THAN 70 milliGRAM(s)/deciliter      Vital Signs Last 24 Hrs  T(F): 98.1 (07-04-24 @ 12:20), Max: 98.7 (07-03-24 @ 21:24)  HR: 98 (07-04-24 @ 12:20) (84 - 98)  BP: 113/68 (07-04-24 @ 12:20) (107/72 - 117/71)  RR: 19 (07-04-24 @ 12:20) (18 - 19)  SpO2: 94% (07-04-24 @ 12:20) (92% - 94%)  Telemetry:   CAPILLARY BLOOD GLUCOSE      POCT Blood Glucose.: 149 mg/dL (04 Jul 2024 12:07)  POCT Blood Glucose.: 88 mg/dL (04 Jul 2024 08:15)  POCT Blood Glucose.: 237 mg/dL (03 Jul 2024 22:17)  POCT Blood Glucose.: 229 mg/dL (03 Jul 2024 17:32)    I&O's Summary    03 Jul 2024 07:01  -  04 Jul 2024 07:00  --------------------------------------------------------  IN: 120 mL / OUT: 640 mL / NET: -520 mL    04 Jul 2024 07:01  -  04 Jul 2024 15:58  --------------------------------------------------------  IN: 240 mL / OUT: 700 mL / NET: -460 mL        PHYSICAL EXAM:  GENERAL: NAD, well-developed  HEAD:  Atraumatic, Normocephalic  EYES: EOMI, PERRLA, conjunctiva and sclera clear  NECK: Supple, No JVD  CHEST/LUNG: Clear to auscultation bilaterally; No wheeze  HEART: Regular rate and rhythm; No murmurs, rubs, or gallops  ABDOMEN: Soft, Nontender, Nondistended; Bowel sounds present  EXTREMITIES:  2+ Peripheral Pulses, No clubbing, cyanosis, or edema  PSYCH: AAOx3  NEUROLOGY: non-focal  SKIN: No rashes or lesions    LABS:                        10.0   8.45  )-----------( 279      ( 04 Jul 2024 07:15 )             31.3     07-04    138  |  99  |  14  ----------------------------<  73  3.3<L>   |  30  |  0.39<L>    Ca    8.2<L>      04 Jul 2024 07:15            Urinalysis Basic - ( 04 Jul 2024 07:15 )    Color: x / Appearance: x / SG: x / pH: x  Gluc: 73 mg/dL / Ketone: x  / Bili: x / Urobili: x   Blood: x / Protein: x / Nitrite: x   Leuk Esterase: x / RBC: x / WBC x   Sq Epi: x / Non Sq Epi: x / Bacteria: x        RADIOLOGY & ADDITIONAL TESTS:    Imaging Personally Reviewed:    Consultant(s) Notes Reviewed:      Care Discussed with Consultants/Other Providers:   Patient is a 84y old  Female who presents with a chief complaint of     SUBJECTIVE / OVERNIGHT EVENTS: no new events    MEDICATIONS  (STANDING):  apixaban 2.5 milliGRAM(s) Oral every 12 hours  aspirin enteric coated 81 milliGRAM(s) Oral daily  dextrose 10% Bolus 125 milliLiter(s) IV Bolus once  dextrose 5%. 1000 milliLiter(s) (50 mL/Hr) IV Continuous <Continuous>  dextrose 5%. 1000 milliLiter(s) (100 mL/Hr) IV Continuous <Continuous>  dextrose 50% Injectable 25 Gram(s) IV Push once  dextrose 50% Injectable 12.5 Gram(s) IV Push once  doxazosin 2 milliGRAM(s) Oral at bedtime  famotidine    Tablet 20 milliGRAM(s) Oral daily  furosemide   Injectable 40 milliGRAM(s) IV Push two times a day  glucagon  Injectable 1 milliGRAM(s) IntraMuscular once  insulin glargine Injectable (LANTUS) 5 Unit(s) SubCutaneous at bedtime  insulin lispro (ADMELOG) corrective regimen sliding scale   SubCutaneous at bedtime  insulin lispro (ADMELOG) corrective regimen sliding scale   SubCutaneous three times a day before meals  loratadine 10 milliGRAM(s) Oral daily  memantine 10 milliGRAM(s) Oral two times a day  metoprolol tartrate 12.5 milliGRAM(s) Oral two times a day  pancrelipase  (CREON 24,000 Lipase Units) 1 Capsule(s) Oral two times a day with meals  pantoprazole    Tablet 40 milliGRAM(s) Oral before breakfast  povidone iodine 10% Solution 1 Application(s) Topical daily  QUEtiapine 25 milliGRAM(s) Oral two times a day  sertraline 25 milliGRAM(s) Oral daily    MEDICATIONS  (PRN):  acetaminophen     Tablet .. 650 milliGRAM(s) Oral every 6 hours PRN Mild Pain (1 - 3)  dextrose Oral Gel 15 Gram(s) Oral once PRN Blood Glucose LESS THAN 70 milliGRAM(s)/deciliter      Vital Signs Last 24 Hrs  T(F): 98.1 (07-04-24 @ 12:20), Max: 98.7 (07-03-24 @ 21:24)  HR: 98 (07-04-24 @ 12:20) (84 - 98)  BP: 113/68 (07-04-24 @ 12:20) (107/72 - 117/71)  RR: 19 (07-04-24 @ 12:20) (18 - 19)  SpO2: 94% (07-04-24 @ 12:20) (92% - 94%)  Telemetry:   CAPILLARY BLOOD GLUCOSE      POCT Blood Glucose.: 149 mg/dL (04 Jul 2024 12:07)  POCT Blood Glucose.: 88 mg/dL (04 Jul 2024 08:15)  POCT Blood Glucose.: 237 mg/dL (03 Jul 2024 22:17)  POCT Blood Glucose.: 229 mg/dL (03 Jul 2024 17:32)    I&O's Summary    03 Jul 2024 07:01  -  04 Jul 2024 07:00  --------------------------------------------------------  IN: 120 mL / OUT: 640 mL / NET: -520 mL    04 Jul 2024 07:01  -  04 Jul 2024 15:58  --------------------------------------------------------  IN: 240 mL / OUT: 700 mL / NET: -460 mL        PHYSICAL EXAM:  GENERAL: NAD, well-developed  HEAD:  Atraumatic, Normocephalic  EYES: EOMI, PERRLA, conjunctiva and sclera clear  NECK: Supple, No JVD  CHEST/LUNG: Clear to auscultation bilaterally; No wheeze  HEART: Regular rate and rhythm; No murmurs, rubs, or gallops  ABDOMEN: Soft, Nontender, Nondistended; Bowel sounds present  EXTREMITIES:  2+ Peripheral Pulses, No clubbing, cyanosis, or edema  PSYCH: AAOx3  NEUROLOGY: non-focal  SKIN: No rashes or lesions    LABS:                        10.0   8.45  )-----------( 279      ( 04 Jul 2024 07:15 )             31.3     07-04    138  |  99  |  14  ----------------------------<  73  3.3<L>   |  30  |  0.39<L>    Ca    8.2<L>      04 Jul 2024 07:15            Urinalysis Basic - ( 04 Jul 2024 07:15 )    Color: x / Appearance: x / SG: x / pH: x  Gluc: 73 mg/dL / Ketone: x  / Bili: x / Urobili: x   Blood: x / Protein: x / Nitrite: x   Leuk Esterase: x / RBC: x / WBC x   Sq Epi: x / Non Sq Epi: x / Bacteria: x        RADIOLOGY & ADDITIONAL TESTS:    Imaging Personally Reviewed:    Consultant(s) Notes Reviewed:      Care Discussed with Consultants/Other Providers:

## 2024-07-05 DIAGNOSIS — F41.9 ANXIETY DISORDER, UNSPECIFIED: ICD-10-CM

## 2024-07-05 DIAGNOSIS — Z51.5 ENCOUNTER FOR PALLIATIVE CARE: ICD-10-CM

## 2024-07-05 DIAGNOSIS — R52 PAIN, UNSPECIFIED: ICD-10-CM

## 2024-07-05 DIAGNOSIS — Z71.89 OTHER SPECIFIED COUNSELING: ICD-10-CM

## 2024-07-05 DIAGNOSIS — F03.90 UNSPECIFIED DEMENTIA, UNSPECIFIED SEVERITY, WITHOUT BEHAVIORAL DISTURBANCE, PSYCHOTIC DISTURBANCE, MOOD DISTURBANCE, AND ANXIETY: ICD-10-CM

## 2024-07-05 DIAGNOSIS — I82.409 ACUTE EMBOLISM AND THROMBOSIS OF UNSPECIFIED DEEP VEINS OF UNSPECIFIED LOWER EXTREMITY: ICD-10-CM

## 2024-07-05 DIAGNOSIS — C25.9 MALIGNANT NEOPLASM OF PANCREAS, UNSPECIFIED: ICD-10-CM

## 2024-07-05 DIAGNOSIS — R53.2 FUNCTIONAL QUADRIPLEGIA: ICD-10-CM

## 2024-07-05 LAB
ANION GAP SERPL CALC-SCNC: 11 MMOL/L — SIGNIFICANT CHANGE UP (ref 5–17)
ANION GAP SERPL CALC-SCNC: 7 MMOL/L — SIGNIFICANT CHANGE UP (ref 5–17)
BUN SERPL-MCNC: 14 MG/DL — SIGNIFICANT CHANGE UP (ref 7–23)
BUN SERPL-MCNC: 15 MG/DL — SIGNIFICANT CHANGE UP (ref 7–23)
CALCIUM SERPL-MCNC: 8 MG/DL — LOW (ref 8.4–10.5)
CALCIUM SERPL-MCNC: 8.1 MG/DL — LOW (ref 8.4–10.5)
CHLORIDE SERPL-SCNC: 95 MMOL/L — LOW (ref 96–108)
CHLORIDE SERPL-SCNC: 97 MMOL/L — SIGNIFICANT CHANGE UP (ref 96–108)
CO2 SERPL-SCNC: 28 MMOL/L — SIGNIFICANT CHANGE UP (ref 22–31)
CO2 SERPL-SCNC: 31 MMOL/L — SIGNIFICANT CHANGE UP (ref 22–31)
CREAT SERPL-MCNC: 0.44 MG/DL — LOW (ref 0.5–1.3)
CREAT SERPL-MCNC: 0.53 MG/DL — SIGNIFICANT CHANGE UP (ref 0.5–1.3)
EGFR: 90 ML/MIN/1.73M2 — SIGNIFICANT CHANGE UP
EGFR: 94 ML/MIN/1.73M2 — SIGNIFICANT CHANGE UP
GLUCOSE BLDC GLUCOMTR-MCNC: 103 MG/DL — HIGH (ref 70–99)
GLUCOSE BLDC GLUCOMTR-MCNC: 230 MG/DL — HIGH (ref 70–99)
GLUCOSE BLDC GLUCOMTR-MCNC: 247 MG/DL — HIGH (ref 70–99)
GLUCOSE BLDC GLUCOMTR-MCNC: 300 MG/DL — HIGH (ref 70–99)
GLUCOSE BLDC GLUCOMTR-MCNC: 328 MG/DL — HIGH (ref 70–99)
GLUCOSE SERPL-MCNC: 220 MG/DL — HIGH (ref 70–99)
GLUCOSE SERPL-MCNC: 76 MG/DL — SIGNIFICANT CHANGE UP (ref 70–99)
MAGNESIUM SERPL-MCNC: 1.6 MG/DL — SIGNIFICANT CHANGE UP (ref 1.6–2.6)
POTASSIUM SERPL-MCNC: 3.4 MMOL/L — LOW (ref 3.5–5.3)
POTASSIUM SERPL-MCNC: 4 MMOL/L — SIGNIFICANT CHANGE UP (ref 3.5–5.3)
POTASSIUM SERPL-SCNC: 3.4 MMOL/L — LOW (ref 3.5–5.3)
POTASSIUM SERPL-SCNC: 4 MMOL/L — SIGNIFICANT CHANGE UP (ref 3.5–5.3)
SODIUM SERPL-SCNC: 133 MMOL/L — LOW (ref 135–145)
SODIUM SERPL-SCNC: 136 MMOL/L — SIGNIFICANT CHANGE UP (ref 135–145)

## 2024-07-05 PROCEDURE — 99497 ADVNCD CARE PLAN 30 MIN: CPT | Mod: 25

## 2024-07-05 PROCEDURE — 99223 1ST HOSP IP/OBS HIGH 75: CPT

## 2024-07-05 PROCEDURE — 99498 ADVNCD CARE PLAN ADDL 30 MIN: CPT

## 2024-07-05 RX ORDER — POTASSIUM CHLORIDE 600 MG/1
40 TABLET, FILM COATED, EXTENDED RELEASE ORAL ONCE
Refills: 0 | Status: COMPLETED | OUTPATIENT
Start: 2024-07-05 | End: 2024-07-05

## 2024-07-05 RX ORDER — APIXABAN 5 MG/1
1 TABLET, FILM COATED ORAL
Qty: 60 | Refills: 0
Start: 2024-07-05 | End: 2024-08-03

## 2024-07-05 RX ORDER — MAGNESIUM SULFATE 100 %
2 POWDER (GRAM) MISCELLANEOUS ONCE
Refills: 0 | Status: COMPLETED | OUTPATIENT
Start: 2024-07-05 | End: 2024-07-05

## 2024-07-05 RX ORDER — TRAMADOL HYDROCHLORIDE 50 MG/1
25 TABLET, FILM COATED ORAL EVERY 8 HOURS
Refills: 0 | Status: DISCONTINUED | OUTPATIENT
Start: 2024-07-05 | End: 2024-07-09

## 2024-07-05 RX ADMIN — Medication 20 MILLIGRAM(S): at 13:34

## 2024-07-05 RX ADMIN — FUROSEMIDE 40 MILLIGRAM(S): 10 INJECTION, SOLUTION INTRAMUSCULAR; INTRAVENOUS at 05:33

## 2024-07-05 RX ADMIN — Medication 25 GRAM(S): at 05:33

## 2024-07-05 RX ADMIN — FUROSEMIDE 40 MILLIGRAM(S): 10 INJECTION, SOLUTION INTRAMUSCULAR; INTRAVENOUS at 14:32

## 2024-07-05 RX ADMIN — MEMANTINE HYDROCHLORIDE 10 MILLIGRAM(S): 7 CAPSULE, EXTENDED RELEASE ORAL at 19:24

## 2024-07-05 RX ADMIN — Medication 12.5 MILLIGRAM(S): at 19:20

## 2024-07-05 RX ADMIN — Medication 1 CAPSULE(S): at 19:22

## 2024-07-05 RX ADMIN — Medication 1 CAPSULE(S): at 09:06

## 2024-07-05 RX ADMIN — TRAMADOL HYDROCHLORIDE 25 MILLIGRAM(S): 50 TABLET, FILM COATED ORAL at 22:15

## 2024-07-05 RX ADMIN — INSULIN LISPRO 4: 100 INJECTION, SOLUTION SUBCUTANEOUS at 13:18

## 2024-07-05 RX ADMIN — SERTRALINE HYDROCHLORIDE 25 MILLIGRAM(S): 100 TABLET, FILM COATED ORAL at 13:34

## 2024-07-05 RX ADMIN — DOXAZOSIN MESYLATE 2 MILLIGRAM(S): 2 TABLET ORAL at 22:15

## 2024-07-05 RX ADMIN — Medication 25 MILLIGRAM(S): at 05:33

## 2024-07-05 RX ADMIN — ASPIRIN 81 MILLIGRAM(S): 325 TABLET, FILM COATED ORAL at 13:34

## 2024-07-05 RX ADMIN — APIXABAN 2.5 MILLIGRAM(S): 5 TABLET, FILM COATED ORAL at 19:22

## 2024-07-05 RX ADMIN — INSULIN GLARGINE 5 UNIT(S): 100 INJECTION, SOLUTION SUBCUTANEOUS at 22:15

## 2024-07-05 RX ADMIN — MEMANTINE HYDROCHLORIDE 10 MILLIGRAM(S): 7 CAPSULE, EXTENDED RELEASE ORAL at 05:33

## 2024-07-05 RX ADMIN — TRAMADOL HYDROCHLORIDE 25 MILLIGRAM(S): 50 TABLET, FILM COATED ORAL at 14:31

## 2024-07-05 RX ADMIN — PANTOPRAZOLE SODIUM 40 MILLIGRAM(S): 40 INJECTION, POWDER, FOR SOLUTION INTRAVENOUS at 05:33

## 2024-07-05 RX ADMIN — LORATADINE 10 MILLIGRAM(S): 10 TABLET ORAL at 13:34

## 2024-07-05 RX ADMIN — INSULIN LISPRO 2: 100 INJECTION, SOLUTION SUBCUTANEOUS at 17:48

## 2024-07-05 RX ADMIN — Medication 25 MILLIGRAM(S): at 19:23

## 2024-07-05 RX ADMIN — TRAMADOL HYDROCHLORIDE 25 MILLIGRAM(S): 50 TABLET, FILM COATED ORAL at 15:32

## 2024-07-05 RX ADMIN — APIXABAN 2.5 MILLIGRAM(S): 5 TABLET, FILM COATED ORAL at 05:32

## 2024-07-05 RX ADMIN — Medication 12.5 MILLIGRAM(S): at 05:32

## 2024-07-05 RX ADMIN — Medication 1 APPLICATION(S): at 13:35

## 2024-07-05 RX ADMIN — POTASSIUM CHLORIDE 40 MILLIEQUIVALENT(S): 600 TABLET, FILM COATED, EXTENDED RELEASE ORAL at 05:32

## 2024-07-05 NOTE — DIETITIAN INITIAL EVALUATION ADULT - REASON FOR ADMISSION
Heart failure    Per chart, pt is an 84 year old female with PMHx CAD s/p PCI, IDDM2, 2/2 Whipple procedure for pancreatic CA, dementia (baseline AAOx1-2, bedbound, nonverbal) presents via EMS today for 8-10 days worth of LE swelling.

## 2024-07-05 NOTE — DIETITIAN INITIAL EVALUATION ADULT - NSFNSNUTRCHEWSWALLOWFT_GEN_A_CORE
Pt's aide states pt with difficulty chewing due to cracked teeth, denies swallowing difficulty. Is amenable for pt to continue on minced & Moist diet.

## 2024-07-05 NOTE — DIETITIAN INITIAL EVALUATION ADULT - NSFNSNUTRHOMESUPPLEMENTFT_GEN_A_CORE
Aide reports pt takes Vitamin D, Vitamin C, iron, a multivitamin, calcium, and [employer] shakes at home.

## 2024-07-05 NOTE — DIETITIAN INITIAL EVALUATION ADULT - OTHER CALCULATIONS
Fluid needs deferred to team. Energy and protein needs based on bedscale weight obtained by RD: 94 lbs/42.6 kg (07-05)

## 2024-07-05 NOTE — CONSULT NOTE ADULT - TIME BILLING
Total Time Spent 60 minutes.    This includes chart review, patient assessment, discussion and collaboration with interdisciplinary team members, excluding ACP.    COUNSELING:  Face to face meeting to discuss Advanced Care Planning- Time Spent 40 minutes Total Time Spent 75 minutes.    This includes chart review, patient assessment, discussion and collaboration with interdisciplinary team members, excluding ACP.    COUNSELING:  Face to face meeting to discuss Advanced Care Planning- Time Spent 60 minutes

## 2024-07-05 NOTE — DIETITIAN INITIAL EVALUATION ADULT - ENERGY INTAKE
Pt's aide reports pt doing fairly with food at present. Fair-poor intake noted per flowsheets. Is bringing in Orgain for the pt. Is amenable for pt to try chocolate [employer] Glucose Support shake in-house. Pt's aide reports pt doing fairly with food at present. Fair-poor intake noted per flowsheets. Is bringing in Orgain for the pt. Is amenable for pt to try chocolate Glucerna shake in-house.

## 2024-07-05 NOTE — DIETITIAN INITIAL EVALUATION ADULT - NSFNSADHERENCEPTAFT_GEN_A_CORE
Pt noted with hx of T2DM. Per aide, pt takes lantus and humalog, and wears a CGM that the aide checks throughout the day. BG readings range from 60->200 mg/dl depending on how shes eating. A1c 6.4% suggests good glycemic management.

## 2024-07-05 NOTE — CONSULT NOTE ADULT - SUBJECTIVE AND OBJECTIVE BOX
Patient is a 84y old  Female who presents with a chief complaint of Heart failure    Per chart, pt is an 84 year old female with PMHx CAD s/p PCI, IDDM2, 2/2 Whipple procedure for pancreatic CA, dementia (baseline AAOx1-2, bedbound, nonverbal) presents via EMS today for 8-10 days worth of LE swelling. (05 Jul 2024 12:07)      HPI:  85 y/o female w/ PMHx CAD s/p PCI, IDDM2, 2/2 Whipple procedure for pancreatic CA, dementia (baseline AAOx1-2, bedbound, nonverbal) presents via EMS today for 8-10 days worth of  LE swelling. On 2L NC, baseline no oxygen. Caretaker notes that pt has b/l LE swelling, upper b/l arms R>L have been more swollen than usual, and possible abdominal swelling. Notes that pt occasionally has episodes that seems like pt is wheezing for air. Pt was sent into today for by PCP after son in law called 911 after abnormal lab results with a BNP on 6/28 of 2826. Pt denies PND, orthopnea, dysuria, rashes, trauma, dietary changes, trauma, recent travel or infectious contacts, hx of smoking, asthma, copd. (02 Jul 2024 17:53)      PAST MEDICAL & SURGICAL HISTORY:  CAD (coronary artery disease)      Dementia      Pancreatic cancer      Diabetes mellitus      GERD (gastroesophageal reflux disease)      H/O Whipple procedure          MEDICATIONS  (STANDING):  apixaban 2.5 milliGRAM(s) Oral every 12 hours  aspirin enteric coated 81 milliGRAM(s) Oral daily  dextrose 10% Bolus 125 milliLiter(s) IV Bolus once  dextrose 5%. 1000 milliLiter(s) (50 mL/Hr) IV Continuous <Continuous>  dextrose 5%. 1000 milliLiter(s) (100 mL/Hr) IV Continuous <Continuous>  dextrose 50% Injectable 25 Gram(s) IV Push once  dextrose 50% Injectable 12.5 Gram(s) IV Push once  doxazosin 2 milliGRAM(s) Oral at bedtime  famotidine    Tablet 20 milliGRAM(s) Oral daily  furosemide   Injectable 40 milliGRAM(s) IV Push two times a day  glucagon  Injectable 1 milliGRAM(s) IntraMuscular once  insulin glargine Injectable (LANTUS) 5 Unit(s) SubCutaneous at bedtime  insulin lispro (ADMELOG) corrective regimen sliding scale   SubCutaneous at bedtime  insulin lispro (ADMELOG) corrective regimen sliding scale   SubCutaneous three times a day before meals  loratadine 10 milliGRAM(s) Oral daily  memantine 10 milliGRAM(s) Oral two times a day  metoprolol tartrate 12.5 milliGRAM(s) Oral two times a day  pancrelipase  (CREON 24,000 Lipase Units) 1 Capsule(s) Oral two times a day with meals  pantoprazole    Tablet 40 milliGRAM(s) Oral before breakfast  povidone iodine 10% Solution 1 Application(s) Topical daily  QUEtiapine 25 milliGRAM(s) Oral two times a day  sertraline 25 milliGRAM(s) Oral daily  traMADol 25 milliGRAM(s) Oral every 8 hours    MEDICATIONS  (PRN):  acetaminophen     Tablet .. 650 milliGRAM(s) Oral every 6 hours PRN Mild Pain (1 - 3)  dextrose Oral Gel 15 Gram(s) Oral once PRN Blood Glucose LESS THAN 70 milliGRAM(s)/deciliter      Allergies    No Known Allergies    Intolerances        VITALS:    Vital Signs Last 24 Hrs  T(C): 36.8 (05 Jul 2024 19:55), Max: 37.1 (05 Jul 2024 19:17)  T(F): 98.2 (05 Jul 2024 19:55), Max: 98.8 (05 Jul 2024 19:17)  HR: 113 (05 Jul 2024 19:55) (65 - 113)  BP: 120/69 (05 Jul 2024 19:55) (101/61 - 120/69)  BP(mean): --  RR: 18 (05 Jul 2024 19:55) (18 - 19)  SpO2: 92% (05 Jul 2024 19:55) (92% - 94%)    Parameters below as of 05 Jul 2024 19:55  Patient On (Oxygen Delivery Method): room air        LABS:                          10.0   8.45  )-----------( 279      ( 04 Jul 2024 07:15 )             31.3       07-05    133<L>  |  95<L>  |  15  ----------------------------<  220<H>  4.0   |  31  |  0.53    Ca    8.0<L>      05 Jul 2024 17:39  Mg     1.6     07-05        CAPILLARY BLOOD GLUCOSE      POCT Blood Glucose.: 247 mg/dL (05 Jul 2024 21:18)  POCT Blood Glucose.: 230 mg/dL (05 Jul 2024 16:58)  POCT Blood Glucose.: 328 mg/dL (05 Jul 2024 13:11)  POCT Blood Glucose.: 300 mg/dL (05 Jul 2024 12:36)  POCT Blood Glucose.: 103 mg/dL (05 Jul 2024 08:20)          LOWER EXTREMITY PHYSICAL EXAM:    Vascular: DP/PT 0/4, B/L, CFT <3 seconds B/L, Temperature gradient WNL, B/L.   Neuro: unable to assess  Musculoskeletal/Ortho: pain with palp of right heel   Skin: right heel ulcer to subQ with no acute signs of infection secondary to pressure present on admission, chronic in nature, measuring 3x3x0.5 cm in size, left heel DTI secondary to pressure present on admission

## 2024-07-05 NOTE — CONSULT NOTE ADULT - PROBLEM SELECTOR RECOMMENDATION 8
GaP team consulted for complex medical decision making in the setting of serious illness and symptoms management     In the event of worsening symptoms, please contact the Palliative Medicine team via In the event of newly developing, evolving, or worsening symptoms, please contact the Palliative Medicine team via pager [facility] #[phone]  The Geriatric and Palliative Medicine service has coverage 24 hours a day/ 7 days a week to provide medical recommendations regarding symptom management needs via telephone

## 2024-07-05 NOTE — DIETITIAN INITIAL EVALUATION ADULT - NUTRITION CONSULT
"Anesthesia Transfer of Care Note    Patient: Nehal Araujo    Procedure(s) Performed: Procedure(s) (LRB):  ARTHROSCOPY,KNEE,WITH MENISCUS REPAIR (Right)    Patient location: PACU    Anesthesia Type: general    Transport from OR: Transported from OR on room air with adequate spontaneous ventilation    Post pain: adequate analgesia    Post assessment: no apparent anesthetic complications and tolerated procedure well    Post vital signs: stable    Level of consciousness: awake, alert and oriented    Nausea/Vomiting: no nausea/vomiting    Complications: none    Transfer of care protocol was followed      Last vitals:   Visit Vitals  /72 (BP Location: Right arm, Patient Position: Lying)   Pulse 98   Temp 36.5 °C (97.7 °F) (Skin)   Resp 18   Ht 5' 3" (1.6 m)   Wt 77.6 kg (171 lb 1.2 oz)   SpO2 99%   Breastfeeding No   BMI 30.30 kg/m²     " yes

## 2024-07-05 NOTE — DIETITIAN INITIAL EVALUATION ADULT - PERSON TAUGHT/METHOD
Discussed importance of adequate protein-energy consumption to meet estimated nutrient needs. Encouraged intake of meals and oral nutrition supplements as tolerated. Encouraged intake of protein-rich foods first at mealtimes to help preserve lean muscle mass and aid in wound healing. Reviewed food choices that are high in protein. Pt's aide noted with good comprehension and made aware RD remains available for further questions/concerns./verbal instruction/caregiver

## 2024-07-05 NOTE — CONSULT NOTE ADULT - PROBLEM SELECTOR RECOMMENDATION 7
HCP: Gamal Delgadillo, who refer to take patient's medical decisions to her sister Shirley   Full code  Please see complete GOC note

## 2024-07-05 NOTE — DIETITIAN INITIAL EVALUATION ADULT - OTHER INFO
Aide unsure of pt's UBW but endorses weight loss over time.  Dosing wt: 89.9 lbs (07-02, stated)  Wt history per chart: 94 lbs (07-05, RD obtained bedscale wt), 95 lbs (11/26/2019). Wt appears to be stable at this time. RD to continue to monitor weight trends as able/available.     Additional nutrition-related concerns:  - Ordered for lantus and admelog ISS for glycemic management. BG trending elevated at times   - IV lasix for diuresis  - Creon 2x/day (hx Whipple procedure for pancreatic cancer)  - palliative consult pending

## 2024-07-05 NOTE — DIETITIAN INITIAL EVALUATION ADULT - NSFNSGIIOFT_GEN_A_CORE
No GI distress noted at this time. Last BM 7/4 per chart. Pt not currently ordered for bowel regimen.

## 2024-07-05 NOTE — DIETITIAN INITIAL EVALUATION ADULT - NSFNSPHYEXAMSKINFT_GEN_A_CORE
Pressure Injury 1: Right:, heel, Unstageable  Pressure Injury 2: none, none  Pressure Injury 3: Left:, heel, Stage II  Pressure Injury 4: sacrum, Stage II    Per WC note 7/3:  "BL heels PIs  LLE lateral wound  Sacral region DTPI  Mid back CNRO DTPI"

## 2024-07-05 NOTE — CONSULT NOTE ADULT - CONVERSATION DETAILS
First, I introduced myself and my role in patient care. Shirley verbalized understanding and was receptive with a palliative care consult. I have reviewed the patient's medical and social history and events leading to the patient's hospitalization.    Daughter Shirley, who is actively involved in the patient's care, reports that the HCP is her sister Gamal Delgadillo who refers to her to take the patient's medical decisions.   Inquired about thoughts regarding cardiopulmonary resuscitation and stated that she would like the patient to be full code, meaning receiving CPR and intubation; she showed a good understanding regarding the patient's advanced dementia; however, due to the patient's [employer], which mandates all medical interventions, she has to receive all medical interventions.       I explained to Shirley that this morning, the patient was moaning, probably due to pain, and she agreed with the scheduled pain medication.. Answered questions, and emotional support was provided.

## 2024-07-05 NOTE — DIETITIAN INITIAL EVALUATION ADULT - REASON INDICATOR FOR ASSESSMENT
RD consult for MST Score 2 or > and Pressure Injury Stage 2 or >.  Source: pt's home aide Berna at the bedside, medical record. Pt noted with hx dementia, confused unable to meaningfully participate in interview at this time. Chart reviewed, events noted.

## 2024-07-05 NOTE — PROGRESS NOTE ADULT - SUBJECTIVE AND OBJECTIVE BOX
CARDIOLOGY FOLLOW UP - Dr. Phelps  DATE OF SERVICE: 7/5/24    CC  Tele events noted    REVIEW OF SYSTEMS:  UTO    PHYSICAL EXAM:  T(C): 36.6 (07-05-24 @ 11:09), Max: 36.8 (07-04-24 @ 21:10)  HR: 65 (07-05-24 @ 11:09) (65 - 98)  BP: 101/61 (07-05-24 @ 11:09) (100/65 - 144/82)  RR: 19 (07-05-24 @ 11:09) (18 - 19)  SpO2: 93% (07-05-24 @ 11:09) (91% - 94%)  Wt(kg): --  I&O's Summary    04 Jul 2024 07:01  -  05 Jul 2024 07:00  --------------------------------------------------------  IN: 360 mL / OUT: 2000 mL / NET: -1640 mL    05 Jul 2024 07:01  -  05 Jul 2024 13:16  --------------------------------------------------------  IN: 360 mL / OUT: 1100 mL / NET: -740 mL        Appearance: Elderly female  	  Cardiovascular: Normal S1 S2,RRR, No JVD, No murmurs  Respiratory: Lungs clear to auscultation b/l  Gastrointestinal:  Soft, Non-tender, + BS	  Extremities: Normal range of motion, No clubbing, cyanosis or edema      Home Medications:  aspirin 81 mg oral delayed release tablet: 1 tab(s) orally once a day (02 Jul 2024 19:15)  Creon 24,000 units oral delayed release capsule: 1 cap(s) orally 2 times a day (before meals) (02 Jul 2024 19:17)  doxazosin 2 mg oral tablet: 1 tab(s) orally once a day (at bedtime) (02 Jul 2024 19:17)  famotidine 20 mg oral tablet: 1 tab(s) orally once a day (02 Jul 2024 19:17)  HumaLOG 100 units/mL subcutaneous solution: 2 unit(s) subcutaneous 3 times a day (before meals) as per sliding scale (02 Jul 2024 19:17)  Lantus 100 units/mL subcutaneous solution: 4 unit(s) subcutaneous once a day (02 Jul 2024 19:17)  loratadine 10 mg oral tablet: 1 tab(s) orally once a day (02 Jul 2024 19:14)  methenamine hippurate 1 g oral tablet: 1 tab(s) orally 2 times a day (02 Jul 2024 19:17)  metoprolol tartrate 25 mg oral tablet: 0.5 tab(s) orally 2 times a day (02 Jul 2024 19:17)  Namenda 10 mg oral tablet: 1 tab(s) orally 2 times a day (02 Jul 2024 19:17)  NexIUM 24HR 20 mg oral delayed release tablet: 1 tab(s) orally once a day (02 Jul 2024 19:14)  otc supplement(s): calcium 1g / vitamin d3 1000IU / ferrous sulfate 325mg / vitamin c 100mg / vitamin b12 1000mcg (02 Jul 2024 19:14)  QUEtiapine 25 mg oral tablet: 1 tab(s) orally 2 times a day (02 Jul 2024 19:17)  sertraline 25 mg oral tablet: 1 tab(s) orally once a day (at bedtime) (02 Jul 2024 19:17)      MEDICATIONS  (STANDING):  apixaban 2.5 milliGRAM(s) Oral every 12 hours  aspirin enteric coated 81 milliGRAM(s) Oral daily  dextrose 10% Bolus 125 milliLiter(s) IV Bolus once  dextrose 5%. 1000 milliLiter(s) (50 mL/Hr) IV Continuous <Continuous>  dextrose 5%. 1000 milliLiter(s) (100 mL/Hr) IV Continuous <Continuous>  dextrose 50% Injectable 25 Gram(s) IV Push once  dextrose 50% Injectable 12.5 Gram(s) IV Push once  doxazosin 2 milliGRAM(s) Oral at bedtime  famotidine    Tablet 20 milliGRAM(s) Oral daily  furosemide   Injectable 40 milliGRAM(s) IV Push two times a day  glucagon  Injectable 1 milliGRAM(s) IntraMuscular once  insulin glargine Injectable (LANTUS) 5 Unit(s) SubCutaneous at bedtime  insulin lispro (ADMELOG) corrective regimen sliding scale   SubCutaneous three times a day before meals  insulin lispro (ADMELOG) corrective regimen sliding scale   SubCutaneous at bedtime  loratadine 10 milliGRAM(s) Oral daily  memantine 10 milliGRAM(s) Oral two times a day  metoprolol tartrate 12.5 milliGRAM(s) Oral two times a day  pancrelipase  (CREON 24,000 Lipase Units) 1 Capsule(s) Oral two times a day with meals  pantoprazole    Tablet 40 milliGRAM(s) Oral before breakfast  povidone iodine 10% Solution 1 Application(s) Topical daily  QUEtiapine 25 milliGRAM(s) Oral two times a day  sertraline 25 milliGRAM(s) Oral daily  traMADol 25 milliGRAM(s) Oral every 8 hours      TELEMETRY: 	    ECG:  	  RADIOLOGY:   DIAGNOSTIC TESTING:  [ ] Echocardiogram:  [ ]  Catheterization:  [ ] Stress Test:    OTHER: 	    LABS:	 	    Troponin T, High Sensitivity Result: 63 ng/L [0 - 51] (07-02 @ 15:51)  Troponin T, High Sensitivity Result: 64 ng/L [0 - 51] (07-02 @ 13:57)                          10.0   8.45  )-----------( 279      ( 04 Jul 2024 07:15 )             31.3     07-05    136  |  97  |  14  ----------------------------<  76  3.4<L>   |  28  |  0.44<L>    Ca    8.1<L>      05 Jul 2024 04:03  Mg     1.6     07-05

## 2024-07-05 NOTE — CONSULT NOTE ADULT - CONSULT REASON
LE Edema
right heel ulcer
ams
Wound Consult
complex medical decision making in the setting of serious illness

## 2024-07-05 NOTE — DIETITIAN INITIAL EVALUATION ADULT - PERTINENT LABORATORY DATA
07-05    136  |  97  |  14  ----------------------------<  76  3.4<L>   |  28  |  0.44<L>    Ca    8.1<L>      05 Jul 2024 04:03  Mg     1.6     07-05    CAPILLARY BLOOD GLUCOSE  POCT Blood Glucose.: 103 mg/dL (05 Jul 2024 08:20)  POCT Blood Glucose.: 186 mg/dL (04 Jul 2024 21:16)  POCT Blood Glucose.: 130 mg/dL (04 Jul 2024 17:13)    A1C with Estimated Average Glucose Result: 6.4 % (07-04-24 @ 07:15)  A1C with Estimated Average Glucose Result: 6.4 % (07-03-24 @ 09:43)  A1C with Estimated Average Glucose Result: 6.1 % (04-05-24 @ 07:18)

## 2024-07-05 NOTE — CONSULT NOTE ADULT - PROBLEM SELECTOR RECOMMENDATION 4
- I-stop reviewed   - Advised tramadol 25 mg q8hr ATC, given patient is not able to request pain medication due to advance dementia. Daughter and primary team aware.   - Bowel regimen while on opioids

## 2024-07-05 NOTE — PROGRESS NOTE ADULT - SUBJECTIVE AND OBJECTIVE BOX
Patient is a 84y old  Female who presents with a chief complaint of Heart failure    Per chart, pt is an 84 year old female with PMHx CAD s/p PCI, IDDM2, 2/2 Whipple procedure for pancreatic CA, dementia (baseline AAOx1-2, bedbound, nonverbal) presents via EMS today for 8-10 days worth of LE swelling. (05 Jul 2024 12:07)      SUBJECTIVE / OVERNIGHT EVENTS: no new events    MEDICATIONS  (STANDING):  apixaban 2.5 milliGRAM(s) Oral every 12 hours  aspirin enteric coated 81 milliGRAM(s) Oral daily  dextrose 10% Bolus 125 milliLiter(s) IV Bolus once  dextrose 5%. 1000 milliLiter(s) (100 mL/Hr) IV Continuous <Continuous>  dextrose 5%. 1000 milliLiter(s) (50 mL/Hr) IV Continuous <Continuous>  dextrose 50% Injectable 12.5 Gram(s) IV Push once  dextrose 50% Injectable 25 Gram(s) IV Push once  doxazosin 2 milliGRAM(s) Oral at bedtime  famotidine    Tablet 20 milliGRAM(s) Oral daily  furosemide   Injectable 40 milliGRAM(s) IV Push two times a day  glucagon  Injectable 1 milliGRAM(s) IntraMuscular once  insulin glargine Injectable (LANTUS) 5 Unit(s) SubCutaneous at bedtime  insulin lispro (ADMELOG) corrective regimen sliding scale   SubCutaneous at bedtime  insulin lispro (ADMELOG) corrective regimen sliding scale   SubCutaneous three times a day before meals  loratadine 10 milliGRAM(s) Oral daily  memantine 10 milliGRAM(s) Oral two times a day  metoprolol tartrate 12.5 milliGRAM(s) Oral two times a day  pancrelipase  (CREON 24,000 Lipase Units) 1 Capsule(s) Oral two times a day with meals  pantoprazole    Tablet 40 milliGRAM(s) Oral before breakfast  povidone iodine 10% Solution 1 Application(s) Topical daily  QUEtiapine 25 milliGRAM(s) Oral two times a day  sertraline 25 milliGRAM(s) Oral daily  traMADol 25 milliGRAM(s) Oral every 8 hours    MEDICATIONS  (PRN):  acetaminophen     Tablet .. 650 milliGRAM(s) Oral every 6 hours PRN Mild Pain (1 - 3)  dextrose Oral Gel 15 Gram(s) Oral once PRN Blood Glucose LESS THAN 70 milliGRAM(s)/deciliter      Vital Signs Last 24 Hrs  T(F): 98.3 (07-05-24 @ 14:15), Max: 98.3 (07-04-24 @ 21:10)  HR: 90 (07-05-24 @ 14:15) (65 - 98)  BP: 101/68 (07-05-24 @ 14:15) (100/65 - 144/82)  RR: 18 (07-05-24 @ 14:15) (18 - 19)  SpO2: 94% (07-05-24 @ 14:15) (91% - 94%)  Telemetry:   CAPILLARY BLOOD GLUCOSE      POCT Blood Glucose.: 328 mg/dL (05 Jul 2024 13:11)  POCT Blood Glucose.: 300 mg/dL (05 Jul 2024 12:36)  POCT Blood Glucose.: 103 mg/dL (05 Jul 2024 08:20)  POCT Blood Glucose.: 186 mg/dL (04 Jul 2024 21:16)  POCT Blood Glucose.: 130 mg/dL (04 Jul 2024 17:13)    I&O's Summary    04 Jul 2024 07:01  -  05 Jul 2024 07:00  --------------------------------------------------------  IN: 360 mL / OUT: 2000 mL / NET: -1640 mL    05 Jul 2024 07:01  -  05 Jul 2024 16:25  --------------------------------------------------------  IN: 360 mL / OUT: 1100 mL / NET: -740 mL        PHYSICAL EXAM:  GENERAL: NAD, well-developed  HEAD:  Atraumatic, Normocephalic  EYES: EOMI, PERRLA, conjunctiva and sclera clear  NECK: Supple, No JVD  CHEST/LUNG: Clear to auscultation bilaterally; No wheeze  HEART: Regular rate and rhythm; No murmurs, rubs, or gallops  ABDOMEN: Soft, Nontender, Nondistended; Bowel sounds present  EXTREMITIES:  2+ Peripheral Pulses, No clubbing, cyanosis, or edema  PSYCH: AAOx3  NEUROLOGY: non-focal  SKIN: No rashes or lesions    LABS:                        10.0   8.45  )-----------( 279      ( 04 Jul 2024 07:15 )             31.3     07-05    136  |  97  |  14  ----------------------------<  76  3.4<L>   |  28  |  0.44<L>    Ca    8.1<L>      05 Jul 2024 04:03  Mg     1.6     07-05            Urinalysis Basic - ( 05 Jul 2024 04:03 )    Color: x / Appearance: x / SG: x / pH: x  Gluc: 76 mg/dL / Ketone: x  / Bili: x / Urobili: x   Blood: x / Protein: x / Nitrite: x   Leuk Esterase: x / RBC: x / WBC x   Sq Epi: x / Non Sq Epi: x / Bacteria: x        RADIOLOGY & ADDITIONAL TESTS:    Imaging Personally Reviewed:    Consultant(s) Notes Reviewed:      Care Discussed with Consultants/Other Providers:

## 2024-07-05 NOTE — DIETITIAN INITIAL EVALUATION ADULT - ETIOLOGY
suspected inadequate energy intake with consideration for needs  increased physiological demand for nutrients

## 2024-07-05 NOTE — CONSULT NOTE ADULT - PROBLEM SELECTOR RECOMMENDATION 5
-Advise to continue quetiapine 25 mg BID   -Frequent reassurance and verbal orientation   -Family members or other familiar persons by his bedside.   -Delusions and hallucinations should be neither endorsed nor challenged.   -Physical restraints should be avoided. Alternatives to restraint use, such as constant observation (preferably by someone familiar to the patient such as a family member), may be more effective

## 2024-07-05 NOTE — DIETITIAN INITIAL EVALUATION ADULT - ADD RECOMMEND
1) Continue Kosher diet, texture per team/SLP - aide prefers pt to continue on Minced & Moist at this time.  2) Recommend [employer] Glucose Support shake 1x/day to trial to help support optimal protein-energy intake.  3) Recommend a multivitamin and Vitamin C, pending no medical contraindications, to aid in wound healing.  4) Monitor PO intake, GI tolerance, skin integrity, labs, weight, and bowel movement regularity.   5) Honor food preferences as feasible. Assist with meals PRN and encourage PO intake.  6) RD remains available upon request and will follow-up per protocol.  7) malnutrition alert placed in chart  1) Continue Kosher diet, texture per team/SLP - aide prefers pt to continue on Minced & Moist at this time.  2) Recommend Glucerna shake 1x/day to trial to help support optimal protein-energy intake.  3) Recommend a multivitamin and Vitamin C, pending no medical contraindications, to aid in wound healing.  4) Monitor PO intake, GI tolerance, skin integrity, labs, weight, and bowel movement regularity.   5) Honor food preferences as feasible. Assist with meals PRN and encourage PO intake.  6) RD remains available upon request and will follow-up per protocol.  7) malnutrition alert placed in chart

## 2024-07-05 NOTE — DIETITIAN INITIAL EVALUATION ADULT - PERTINENT MEDS FT
MEDICATIONS  (STANDING):  apixaban 2.5 milliGRAM(s) Oral every 12 hours  aspirin enteric coated 81 milliGRAM(s) Oral daily  dextrose 10% Bolus 125 milliLiter(s) IV Bolus once  dextrose 5%. 1000 milliLiter(s) (50 mL/Hr) IV Continuous <Continuous>  dextrose 5%. 1000 milliLiter(s) (100 mL/Hr) IV Continuous <Continuous>  dextrose 50% Injectable 25 Gram(s) IV Push once  dextrose 50% Injectable 12.5 Gram(s) IV Push once  doxazosin 2 milliGRAM(s) Oral at bedtime  famotidine    Tablet 20 milliGRAM(s) Oral daily  furosemide   Injectable 40 milliGRAM(s) IV Push two times a day  glucagon  Injectable 1 milliGRAM(s) IntraMuscular once  insulin glargine Injectable (LANTUS) 5 Unit(s) SubCutaneous at bedtime  insulin lispro (ADMELOG) corrective regimen sliding scale   SubCutaneous at bedtime  insulin lispro (ADMELOG) corrective regimen sliding scale   SubCutaneous three times a day before meals  loratadine 10 milliGRAM(s) Oral daily  memantine 10 milliGRAM(s) Oral two times a day  metoprolol tartrate 12.5 milliGRAM(s) Oral two times a day  pancrelipase  (CREON 24,000 Lipase Units) 1 Capsule(s) Oral two times a day with meals  pantoprazole    Tablet 40 milliGRAM(s) Oral before breakfast  povidone iodine 10% Solution 1 Application(s) Topical daily  QUEtiapine 25 milliGRAM(s) Oral two times a day  sertraline 25 milliGRAM(s) Oral daily    MEDICATIONS  (PRN):  acetaminophen     Tablet .. 650 milliGRAM(s) Oral every 6 hours PRN Mild Pain (1 - 3)  dextrose Oral Gel 15 Gram(s) Oral once PRN Blood Glucose LESS THAN 70 milliGRAM(s)/deciliter

## 2024-07-05 NOTE — CONSULT NOTE ADULT - ASSESSMENT
103
84 year old patient with right heel ulcer, left heel DTI  - Patient seen and evaluated  - Heel ulcer noted with no signs of infection   - Strict decubitus precaution in bed at all times  - Recommend daily dressing changes with iodosorb gel and covering with allevyn foam, change daily, cavilon to left heel daily  - Will monitor periodically during this admission, reconsult sooner as needed
  A/p  85 y/o female w/ PMHx CAD s/p PCI, IDDM2, 2/2 Whipple procedure for pancreatic CA, dementia (baseline AAOx1-2, bedbound, nonverbal) presents via EMS today for 8-10 days worth of LE swelling.    #LE Swelling  -Trace le edema b/l  -Check le dopplers to r/o dvt  -Can continue iv lasix as ordered, however may not be helpful i/s/o low albumin  -Cont to trend renal fxn/electrolytes while on diuretic  -Check echo  -Replete K+    #CAD sp PCI  -HST mild elevation - peaked at 64  -ECG SR no acute ischemic findings  -Cont asa    #AMS  -Infectious w/u per med        dvt ppx  
  A/P: 85 y/o female w/ PMHx CAD s/p PCI, IDDM2, 2/2 Whipple procedure for pancreatic CA, dementia (baseline AAOx1-2, bedbound, nonverbal) presents via EMS today for 8-10 days worth of  LE swelling. On 2L NC, baseline no oxygen. Caretaker notes that pt has b/l LE swelling, upper b/l arms R>L have been more swollen than usual, and possible abdominal swelling. Notes that pt occasionally has episodes that seems like pt is wheezing for air. Pt was sent into today for by PCP after son in law called 911 after abnormal lab results with a BNP on 6/28 of 2826. Pt denies PND, orthopnea, dysuria, rashes, trauma, dietary changes, trauma, recent travel or infectious contacts, hx of smoking, asthma, copd.     Wound Consult requested to assist w/ management of  BL heels PIs  LLE lateral wound  Sacral region DTPI  Mid back CNRO DTPI    Recommendations:   Rt heel Betadine daily to start for POD to evaluate  Lt heel cavilon, adaptic, allevyn daily to start for POD to evaluate  LLE lateral wound Medihoney adaptic daily      OFF load BL heels at all times while in bed  Sacral region: WOLF BID and PRN soiling  Mid back discussed with ACP rec Ultrasound     cavilon daily off load    Moisturize intact skin w/ SWEEN cream BID  Nutrition Consult for optimization in pt w/ Increased nutritional needs            encourage high quality protein, manuel/ prosource, MVI & Vit C to promote wound healing  Continue turning and positioning w/ offloading assistive devices as per protocol       Continue w/ attends under pads and Pericare w/ purewick care as per protocol  Waffle Cushion to chair when oob to chair  Continue w/ low air loss pressure redistribution bed surface   Pt will need Group 2 mattress on hospital bed and ROHO cushion for wheel chair upon discharge home  Care as per medicine, will follow w/ you  Upon discharge f/u as outpatient at [facility] [address] [facility] [phone]  Seen and D/w team & RN  Thank you for this consult,  NAE Reinoso, [facility]  [phone]  Nights/ Weekends/ Holidays please call:  General Surgery Consult pager [phone]) for emergencies  Wound PT for multilayer leg wrapping or VAC issues (x [phone]) 
85 y/o female w/ PMHx CAD s/p PCI, IDDM2, 2/2 Whipple procedure for pancreatic CA, dementia (baseline AAOx1-2, bedbound, nonverbal) presents via EMS today for 8-10 days worth of  LE swelling. On 2L NC, baseline no oxygen. Caretaker notes that pt has b/l LE swelling, upper b/l arms R>L have been more swollen than usual, and possible abdominal swelling. Notes that pt occasionally has episodes that seems like pt is wheezing for air. Pt was sent into on 7/2  by PCP after son in law called 911 after abnormal lab results with a BNP on 6/28 of 2826. Pt denies PND, orthopnea, dysuria, rashes, trauma, dietary changes, trauma, recent travel or infectious contacts, hx of smoking, asthma, copd. GaP consulted for complex medical decision making in the setting of serious illness and symptoms management

## 2024-07-05 NOTE — DIETITIAN INITIAL EVALUATION ADULT - ORAL INTAKE PTA/DIET HISTORY
Pt's aide states pt eats pretty well with assistance, small portions however. Follows a Kosher diet. Eats softer foods, she mashes up regular foods for the pt.  Confirms NKFA.

## 2024-07-05 NOTE — DIETITIAN INITIAL EVALUATION ADULT - PHYSCIAL ASSESSMENT
visual nutrition-focused physical examination conducted as pt unable to consent at this time due to dementia, confusion. Visual findings noted.

## 2024-07-05 NOTE — CONSULT NOTE ADULT - SUBJECTIVE AND OBJECTIVE BOX
Date of Service: 07-05-24 @ 16:35    HPI:  85 y/o female w/ PMHx CAD s/p PCI, IDDM2, 2/2 Whipple procedure for pancreatic CA, dementia (baseline AAOx1-2, bedbound, nonverbal) presents via EMS today for 8-10 days worth of  LE swelling. On 2L NC, baseline no oxygen. Caretaker notes that pt has b/l LE swelling, upper b/l arms R>L have been more swollen than usual, and possible abdominal swelling. Notes that pt occasionally has episodes that seems like pt is wheezing for air. Pt was sent into today for by PCP after son in law called 911 after abnormal lab results with a BNP on 6/28 of 2826. Pt denies PND, orthopnea, dysuria, rashes, trauma, dietary changes, trauma, recent travel or infectious contacts, hx of smoking, asthma, copd. (02 Jul 2024 17:53)    GaP team consulted for complex medical decision making in the setting of serious illness and symptoms management   Patient seen and examined at bedside. Private nurse aid presented this morning, pt appears uncomfortable, moaning, and pointing her right LE. aid states that patient is on pain.       PERTINENT PM/SXH:   CAD (coronary artery disease)  Dementia  Pancreatic cancer  Diabetes mellitus  GERD (gastroesophageal reflux disease)  H/O Whipple procedure      FAMILY HISTORY:      SOCIAL HISTORY:   Significant other/partner[ ]  Children[x ]  Roman Catholic/Spirituality:  Substance hx:  [ ]   Tobacco hx:  [ ]   Alcohol hx: [ ]   Home Opioid hx:  [ ] I-Stop Reference No: [id number]       PDI	First Name	Last Name	Birth Date	Gender	Street Address	[facility]	Zip Code  JUAN PABLO Nino	1940	Female	[address]	[place] [address]    Prescription Information      PDI Filter:    PDI	My Rx	Current Rx	Drug Type	Rx Written	Rx Dispensed	Drug	Quantity	Days Supply	Prescriber Name	Prescriber POONAM #	Payment Method	Dispenser  A	N	N	O	05/13/2024	05/15/2024	tramadol hcl 50 mg tablet	7	7	Kenisha Holm	[id number]	Medicare	[id number]    Living Situation: [x ]Home  [ ]Long term care  [ ]Rehab [ ]Other    ADVANCE DIRECTIVES:    DNR/MOLST  [ ]  Living Will  [ ]   DECISION MAKER(s):  [ ] Health Care Proxy(s)  [x ] Surrogate(s)  [ ] Guardian           Name(s): Phone Number(s): Daughter Tracey Hernandez [phone] and daughter Chip Merida     BASELINE (I)ADL(s) (prior to admission):  [place]: [ ]Total  [ ] Moderate [x ]Dependent    Allergies    No Known Allergies    Intolerances    MEDICATIONS  (STANDING):  apixaban 2.5 milliGRAM(s) Oral every 12 hours  aspirin enteric coated 81 milliGRAM(s) Oral daily  dextrose 10% Bolus 125 milliLiter(s) IV Bolus once  dextrose 5%. 1000 milliLiter(s) (50 mL/Hr) IV Continuous <Continuous>  dextrose 5%. 1000 milliLiter(s) (100 mL/Hr) IV Continuous <Continuous>  dextrose 50% Injectable 25 Gram(s) IV Push once  dextrose 50% Injectable 12.5 Gram(s) IV Push once  doxazosin 2 milliGRAM(s) Oral at bedtime  famotidine    Tablet 20 milliGRAM(s) Oral daily  furosemide   Injectable 40 milliGRAM(s) IV Push two times a day  glucagon  Injectable 1 milliGRAM(s) IntraMuscular once  insulin glargine Injectable (LANTUS) 5 Unit(s) SubCutaneous at bedtime  insulin lispro (ADMELOG) corrective regimen sliding scale   SubCutaneous at bedtime  insulin lispro (ADMELOG) corrective regimen sliding scale   SubCutaneous three times a day before meals  loratadine 10 milliGRAM(s) Oral daily  memantine 10 milliGRAM(s) Oral two times a day  metoprolol tartrate 12.5 milliGRAM(s) Oral two times a day  pancrelipase  (CREON 24,000 Lipase Units) 1 Capsule(s) Oral two times a day with meals  pantoprazole    Tablet 40 milliGRAM(s) Oral before breakfast  povidone iodine 10% Solution 1 Application(s) Topical daily  QUEtiapine 25 milliGRAM(s) Oral two times a day  sertraline 25 milliGRAM(s) Oral daily  traMADol 25 milliGRAM(s) Oral every 8 hours    MEDICATIONS  (PRN):  acetaminophen     Tablet .. 650 milliGRAM(s) Oral every 6 hours PRN Mild Pain (1 - 3)  dextrose Oral Gel 15 Gram(s) Oral once PRN Blood Glucose LESS THAN 70 milliGRAM(s)/deciliter      ITEMS NOT CHECKED ARE NOT PRESENT  PRESENT SYMPTOMS: [x ]Unable to self-report see CPOT, PAINADs, RDOS  Source if other than patient:  [ ]Family   [ ]Team     Pain: [ ]yes [ ]no  QOL impact -   Location -                    Aggravating factors -  Quality -  Radiation -  Timing-  Severity (0-10 scale):  Minimal acceptable level (0-10 scale):       Dyspnea:                           [ ]Mild [ ]Moderate [ ]Severe  Anxiety:                             [ ]Mild [ ]Moderate [ ]Severe  Fatigue:                             [ ]Mild [ ]Moderate [ ]Severe  Nausea:                             [ ]Mild [ ]Moderate [ ]Severe  Loss of appetite:              [ ]Mild [ ]Moderate [ ]Severe  Constipation:                    [ ]Mild [ ]Moderate [ ]Severe    PCSSQ [Palliative Care Spiritual Screening Question]   Severity (0-10):  Score of 4 or > indicate consideration of Chaplaincy referral.  Chaplaincy Referral: [ ] yes [ ] refused [ ] following [x ] deferred    Caregiver Carmen? : [ ] yes [ ] no [x ] deferred:  Social work referral [ ] Patient & Family Centered Care Referral [ ]     Anticipatory Grief Present?: [ ] yes [ ] no  [ x] deferred: Palliative Social work referral [ ]  Patient & Family Centered Care Referral [ ]       Other Symptoms:  [ ]All other review of systems negative   [x ] Unable to obtain due to poor mentation    PHYSICAL EXAM:  Vital Signs Last 24 Hrs  T(C): 36.8 (05 Jul 2024 14:15), Max: 36.8 (04 Jul 2024 21:10)  T(F): 98.3 (05 Jul 2024 14:15), Max: 98.3 (04 Jul 2024 21:10)  HR: 90 (05 Jul 2024 14:15) (65 - 98)  BP: 101/68 (05 Jul 2024 14:15) (100/65 - 144/82)  BP(mean): --  RR: 18 (05 Jul 2024 14:15) (18 - 19)  SpO2: 94% (05 Jul 2024 14:15) (91% - 94%)    Parameters below as of 05 Jul 2024 14:15  Patient On (Oxygen Delivery Method): room air     I&O's Summary    04 Jul 2024 07:01  -  05 Jul 2024 07:00  --------------------------------------------------------  IN: 360 mL / OUT: 2000 mL / NET: -1640 mL    05 Jul 2024 07:01  -  05 Jul 2024 16:35  --------------------------------------------------------  IN: 360 mL / OUT: 1100 mL / NET: -740 mL        GENERAL:  [x]Alert  [x]Oriented x 0  [ ]Lethargic  [ ]Cachexia  [ ]Unarousable  []Verbal  [ x]Non-Verbal  Behavioral:   [ ]Anxiety  [ ]Delirium [x ]Agitation [ ]Other  HEENT:  [x]Normal   [ ]Dry mouth   [ ]ET Tube/Trach  [ ]Oral lesions  PULMONARY:   [x]Clear [ ]Tachypnea  [ ]Audible excessive secretions   [ ]Rhonchi        [ ]Right [ ]Left [ ]Bilateral  [ ]Crackles        [ ]Right [ ]Left [ ]Bilateral  [ ]Wheezing     [ ]Right [ ]Left [ ]Bilateral  [ ]Diminished BS [ ] Right [ ]Left [ ]Bilateral  CARDIOVASCULAR:    [x]Regular [ ]Irregular [ ]Tachy  [ ]Rusty [ ]Murmur [ ]Other  GASTROINTESTINAL:  [x]Soft  [ ]Distended   [x]+BS  [x]Non tender [ ]Tender  [ ]PEG [ ]OGT/ NGT   Last BM:    GENITOURINARY:  [x]Normal [x ]Incontinent   [ ]Oliguria/Anuria   [ ][place]  MUSCULOSKELETAL:   [ ]Normal   [x]Weakness  [x ]Bed/Wheelchair bound [ ]Edema  NEUROLOGIC:   [x]No focal deficits  [ x] Cognitive impairment  [ ] Dysphagia [ ]Dysarthria [ ] Paresis [ ]Other   SKIN:   []Normal  [ ]Rash   [x ]Pressure ulcer(s) [x ]y [ ]n present on admission. Please see RN documentation which I have reviewed.      CRITICAL CARE:  [ ] Shock Present  [ ]Septic [ ]Cardiogenic [ ]Neurologic [ ]Hypovolemic  [ ]  Vasopressors [ ]  Inotropes   [ ]Respiratory failure present [ ]Mechanical ventilation [ ]Non-invasive ventilatory support [ ]High flow    [ ]Acute  [ ]Chronic [ ]Hypoxic  [ ]Hypercarbic [ ]Other  [x ]Other organ failure : brain and skin     LABS:                        10.0   8.45  )-----------( 279      ( 04 Jul 2024 07:15 )             31.3   07-05    136  |  97  |  14  ----------------------------<  76  3.4<L>   |  28  |  0.44<L>    Ca    8.1<L>      05 Jul 2024 04:03  Mg     1.6     07-05        Urinalysis Basic - ( 05 Jul 2024 04:03 )    Color: x / Appearance: x / SG: x / pH: x  Gluc: 76 mg/dL / Ketone: x  / Bili: x / Urobili: x   Blood: x / Protein: x / Nitrite: x   Leuk Esterase: x / RBC: x / WBC x   Sq Epi: x / Non Sq Epi: x / Bacteria: x      RADIOLOGY & ADDITIONAL STUDIES:     VA Duplex Lower Ext Vein Scan, Bilat (07.03.24 @ 20:15) >  DVT noted in the left popliteal, posterior tibial and peroneal veins.  No right-sided DVT.    CT Abdomen and Pelvis w/ IV Cont (04.10.24 @ 21:27) >  Redemonstrated hematoma within the right hip abductor muscles without   evidence of active bleed.  Hematoma is less prominent as compared to prior exam. No retroperitoneal   hematoma.    CT Angio Chest PE Protocol w/ IV Cont (04.08.24 @ 08:41) >  No pulmonary embolism or evidence for pneumonia.  Trace bilateral pleural effusions.    CT Head No Cont (04.05.24 @ 17:30) >  Motion limited study. No evidence of acute intracranial hemorrhage,   midline shift or CT evidence of acute territorial infarct.  If the patient's symptoms persist, consider short interval follow-up head   CT or brain MRI if there are no MRI contraindications.          PROTEIN CALORIE MALNUTRITION PRESENT: [ ]mild [ ]moderate [ ]severe [ ]underweight [ ]morbid obesity  https://www.andeal.org/vault/2440/web/files/ONC/Table_Clinical%20Characteristics%20to%20Document%20Malnutrition-White%20JV%20et%20al%202012.pdf      Weight (kg): 40.8 (07-02-24 @ 12:31), 56.8 (04-04-24 @ 20:34)    [ ]PPSV2 < or = to 30% [ ]significant weight loss  [ ]poor nutritional intake  [ ]anasarca[ ]Artificial Nutrition      Other REFERRALS:  [ ]Hospice  [ ]Child Life  [ ]Social Work  [x ]Case management [ ]Holistic Therapy                               Care Coordination Assessment 201    COGNITIVE/LEARNING 201  Mental Status    Answers: Alert    Notes: Mental Status    Notes: nonverbal    ADMISSION HISTORY 201  Admitted From    Answers: Home    Functional Status Prior to Admission    Answers: Assistive person,  Answers: Completely dependent    Services Present on Admission    Answers:  Services,  Answers: DME; specify rw,  Answers: Home Health Care    FINANCIAL 201  Does patient have financial concerns for discharge?    Answers: None    LIVING ARRANGEMENTS/SUPPORT 201  Housing Environment    Answers: Apartment,  Answers: Stairs, number of steps 1st floor    Living Arrangements    Answers: Lives with family    Sources of support/caregivers    Answers: Children,  Answers: Hired Care,  Answers: Medicaid Personal Care Aide (Agency/days/hours) 24 hour via [facility]    CAREGIVER CONTACT 201  Does the patient wish to identify a Caregiver?    Answers: Yes    Caregiver Contact    Answers: Caregiver Name: TRACEY NINO,  Answers: Caregiver Relationship: DAUGHTER,  Answers: Caregiver Contact Number: [phone]    DISCHARGE PLANNING 201  Potential Discharge Plan and Services    Answers: Same as Prior Admission    Anticipated Transportation Needs for Discharge    Answers: Ambulance    Who will accompany patient at discharge?    Answers: Self    SCREENING 201  Social Work Screen and Referral    Answers: None    SUMMARY 201  Initial Clinical Summary    Notes: Case and Referral Reviewed.  Patient is a 85 y/o female h/o CAD s/p PCI,  IDDM2, 2/2 Whipple procedure for pancreatic CA, dementia (baseline AAOx1-2, bed  bound, nonverbal) presents via EMS today for 8-10 days worth of  LE swelling.     Met c patient and granddaughter at bedside for the introduction of case  management and discuss DC planning.   Granddaughter contacted daughter,Tracey Nino,identified self as patient's care giver.  daughter confirmed  demographic,patient resides in a private home apartment on the first floor.  Patient has private HHA and known to Medicaid CDPAP c daughter as care giver.    Patient has a Ruth to monitor glucose at home.  Presently,patient remains  acute.  Assigned  to follow.    Anticipated Discharge Plan and Services    Notes: Anticipate skilled home care services. Patient known to [facility])  [phone] for skilled home care services(Visiting RN,Home  P/T,O/T.).  Family requesting ambulance transportation upon DC.       Electronically signed by:  Tianna Loomis  Electronically signed on:  2024-07-03  15:38

## 2024-07-06 LAB
ANION GAP SERPL CALC-SCNC: 6 MMOL/L — SIGNIFICANT CHANGE UP (ref 5–17)
ANION GAP SERPL CALC-SCNC: 9 MMOL/L — SIGNIFICANT CHANGE UP (ref 5–17)
BUN SERPL-MCNC: 22 MG/DL — SIGNIFICANT CHANGE UP (ref 7–23)
BUN SERPL-MCNC: 22 MG/DL — SIGNIFICANT CHANGE UP (ref 7–23)
CALCIUM SERPL-MCNC: 7.6 MG/DL — LOW (ref 8.4–10.5)
CALCIUM SERPL-MCNC: 8 MG/DL — LOW (ref 8.4–10.5)
CHLORIDE SERPL-SCNC: 91 MMOL/L — LOW (ref 96–108)
CHLORIDE SERPL-SCNC: 97 MMOL/L — SIGNIFICANT CHANGE UP (ref 96–108)
CO2 SERPL-SCNC: 31 MMOL/L — SIGNIFICANT CHANGE UP (ref 22–31)
CO2 SERPL-SCNC: 32 MMOL/L — HIGH (ref 22–31)
CREAT SERPL-MCNC: 0.47 MG/DL — LOW (ref 0.5–1.3)
CREAT SERPL-MCNC: 0.49 MG/DL — LOW (ref 0.5–1.3)
EGFR: 92 ML/MIN/1.73M2 — SIGNIFICANT CHANGE UP
EGFR: 93 ML/MIN/1.73M2 — SIGNIFICANT CHANGE UP
GLUCOSE BLDC GLUCOMTR-MCNC: 153 MG/DL — HIGH (ref 70–99)
GLUCOSE BLDC GLUCOMTR-MCNC: 155 MG/DL — HIGH (ref 70–99)
GLUCOSE BLDC GLUCOMTR-MCNC: 178 MG/DL — HIGH (ref 70–99)
GLUCOSE BLDC GLUCOMTR-MCNC: 239 MG/DL — HIGH (ref 70–99)
GLUCOSE BLDC GLUCOMTR-MCNC: 77 MG/DL — SIGNIFICANT CHANGE UP (ref 70–99)
GLUCOSE SERPL-MCNC: 153 MG/DL — HIGH (ref 70–99)
GLUCOSE SERPL-MCNC: 164 MG/DL — HIGH (ref 70–99)
POTASSIUM SERPL-MCNC: 3.5 MMOL/L — SIGNIFICANT CHANGE UP (ref 3.5–5.3)
POTASSIUM SERPL-MCNC: 3.5 MMOL/L — SIGNIFICANT CHANGE UP (ref 3.5–5.3)
POTASSIUM SERPL-SCNC: 3.5 MMOL/L — SIGNIFICANT CHANGE UP (ref 3.5–5.3)
POTASSIUM SERPL-SCNC: 3.5 MMOL/L — SIGNIFICANT CHANGE UP (ref 3.5–5.3)
SODIUM SERPL-SCNC: 132 MMOL/L — LOW (ref 135–145)
SODIUM SERPL-SCNC: 134 MMOL/L — LOW (ref 135–145)

## 2024-07-06 RX ORDER — CADEXOMER IODINE 0.9 %
1 GEL (GRAM) TOPICAL DAILY
Refills: 0 | Status: DISCONTINUED | OUTPATIENT
Start: 2024-07-06 | End: 2024-07-09

## 2024-07-06 RX ORDER — METOPROLOL TARTRATE 50 MG
37.5 TABLET ORAL
Refills: 0 | Status: DISCONTINUED | OUTPATIENT
Start: 2024-07-07 | End: 2024-07-09

## 2024-07-06 RX ORDER — POTASSIUM CHLORIDE 600 MG/1
20 TABLET, FILM COATED, EXTENDED RELEASE ORAL
Refills: 0 | Status: DISCONTINUED | OUTPATIENT
Start: 2024-07-06 | End: 2024-07-09

## 2024-07-06 RX ADMIN — APIXABAN 2.5 MILLIGRAM(S): 5 TABLET, FILM COATED ORAL at 18:07

## 2024-07-06 RX ADMIN — FUROSEMIDE 40 MILLIGRAM(S): 10 INJECTION, SOLUTION INTRAMUSCULAR; INTRAVENOUS at 15:04

## 2024-07-06 RX ADMIN — LORATADINE 10 MILLIGRAM(S): 10 TABLET ORAL at 12:15

## 2024-07-06 RX ADMIN — Medication 12.5 MILLIGRAM(S): at 18:06

## 2024-07-06 RX ADMIN — PANTOPRAZOLE SODIUM 40 MILLIGRAM(S): 40 INJECTION, POWDER, FOR SOLUTION INTRAVENOUS at 06:10

## 2024-07-06 RX ADMIN — Medication 12.5 MILLIGRAM(S): at 06:10

## 2024-07-06 RX ADMIN — INSULIN LISPRO 1: 100 INJECTION, SOLUTION SUBCUTANEOUS at 08:10

## 2024-07-06 RX ADMIN — FUROSEMIDE 40 MILLIGRAM(S): 10 INJECTION, SOLUTION INTRAMUSCULAR; INTRAVENOUS at 06:10

## 2024-07-06 RX ADMIN — TRAMADOL HYDROCHLORIDE 25 MILLIGRAM(S): 50 TABLET, FILM COATED ORAL at 15:04

## 2024-07-06 RX ADMIN — Medication 1 APPLICATION(S): at 12:38

## 2024-07-06 RX ADMIN — INSULIN LISPRO 1: 100 INJECTION, SOLUTION SUBCUTANEOUS at 19:04

## 2024-07-06 RX ADMIN — Medication 1 APPLICATION(S): at 12:16

## 2024-07-06 RX ADMIN — MEMANTINE HYDROCHLORIDE 10 MILLIGRAM(S): 7 CAPSULE, EXTENDED RELEASE ORAL at 06:10

## 2024-07-06 RX ADMIN — Medication 1 CAPSULE(S): at 18:06

## 2024-07-06 RX ADMIN — ASPIRIN 81 MILLIGRAM(S): 325 TABLET, FILM COATED ORAL at 12:15

## 2024-07-06 RX ADMIN — TRAMADOL HYDROCHLORIDE 25 MILLIGRAM(S): 50 TABLET, FILM COATED ORAL at 00:00

## 2024-07-06 RX ADMIN — INSULIN GLARGINE 5 UNIT(S): 100 INJECTION, SOLUTION SUBCUTANEOUS at 22:23

## 2024-07-06 RX ADMIN — APIXABAN 2.5 MILLIGRAM(S): 5 TABLET, FILM COATED ORAL at 06:10

## 2024-07-06 RX ADMIN — TRAMADOL HYDROCHLORIDE 25 MILLIGRAM(S): 50 TABLET, FILM COATED ORAL at 16:04

## 2024-07-06 RX ADMIN — SERTRALINE HYDROCHLORIDE 25 MILLIGRAM(S): 100 TABLET, FILM COATED ORAL at 12:16

## 2024-07-06 RX ADMIN — Medication 20 MILLIGRAM(S): at 12:16

## 2024-07-06 RX ADMIN — Medication 1 CAPSULE(S): at 08:10

## 2024-07-06 RX ADMIN — MEMANTINE HYDROCHLORIDE 10 MILLIGRAM(S): 7 CAPSULE, EXTENDED RELEASE ORAL at 18:07

## 2024-07-06 RX ADMIN — POTASSIUM CHLORIDE 20 MILLIEQUIVALENT(S): 600 TABLET, FILM COATED, EXTENDED RELEASE ORAL at 18:06

## 2024-07-06 RX ADMIN — Medication 25 MILLIGRAM(S): at 06:10

## 2024-07-06 NOTE — PHYSICAL THERAPY INITIAL EVALUATION ADULT - ADDITIONAL COMMENTS
Pt lives in a private house apartment on the first floor.  Pt lives with family, has 24/7 CDPAP to assist with ADLs, as pt is bedbound at baseline.

## 2024-07-06 NOTE — PROGRESS NOTE ADULT - SUBJECTIVE AND OBJECTIVE BOX
CARDIOLOGY FOLLOW UP - Dr. Phelps  DATE OF SERVICE: 7/6/24    CC no acute cv events       REVIEW OF SYSTEMS:  CONSTITUTIONAL: No fever, weight loss, or fatigue  RESPIRATORY: No cough, wheezing, chills or hemoptysis; No Shortness of Breath  CARDIOVASCULAR: No chest pain, palpitations, passing out, dizziness, or leg swelling  GASTROINTESTINAL: No abdominal or epigastric pain. No nausea, vomiting, or hematemesis; No diarrhea or constipation. No melena or hematochezia.  VASCULAR: No edema     PHYSICAL EXAM:  T(C): 37 (07-06-24 @ 05:41), Max: 37.1 (07-05-24 @ 19:17)  HR: 99 (07-06-24 @ 05:41) (65 - 113)  BP: 122/73 (07-06-24 @ 05:41) (101/61 - 122/73)  RR: 18 (07-06-24 @ 05:41) (18 - 19)  SpO2: 92% (07-06-24 @ 05:41) (92% - 94%)  Wt(kg): --  I&O's Summary    05 Jul 2024 07:01  -  06 Jul 2024 07:00  --------------------------------------------------------  IN: 360 mL / OUT: 1620 mL / NET: -1260 mL        Appearance: Normal	  Cardiovascular: Normal S1 S2,RRR, + murmurs  Respiratory: Lungs clear to auscultation	  Gastrointestinal:  Soft, Non-tender, + BS	  Extremities: Normal range of motion, No clubbing, cyanosis or edema      Home Medications:  aspirin 81 mg oral delayed release tablet: 1 tab(s) orally once a day (02 Jul 2024 19:15)  Creon 24,000 units oral delayed release capsule: 1 cap(s) orally 2 times a day (before meals) (02 Jul 2024 19:17)  doxazosin 2 mg oral tablet: 1 tab(s) orally once a day (at bedtime) (02 Jul 2024 19:17)  famotidine 20 mg oral tablet: 1 tab(s) orally once a day (02 Jul 2024 19:17)  HumaLOG 100 units/mL subcutaneous solution: 2 unit(s) subcutaneous 3 times a day (before meals) as per sliding scale (02 Jul 2024 19:17)  Lantus 100 units/mL subcutaneous solution: 4 unit(s) subcutaneous once a day (02 Jul 2024 19:17)  loratadine 10 mg oral tablet: 1 tab(s) orally once a day (02 Jul 2024 19:14)  methenamine hippurate 1 g oral tablet: 1 tab(s) orally 2 times a day (02 Jul 2024 19:17)  metoprolol tartrate 25 mg oral tablet: 0.5 tab(s) orally 2 times a day (02 Jul 2024 19:17)  Namenda 10 mg oral tablet: 1 tab(s) orally 2 times a day (02 Jul 2024 19:17)  NexIUM 24HR 20 mg oral delayed release tablet: 1 tab(s) orally once a day (02 Jul 2024 19:14)  otc supplement(s): calcium 1g / vitamin d3 1000IU / ferrous sulfate 325mg / vitamin c 100mg / vitamin b12 1000mcg (02 Jul 2024 19:14)  QUEtiapine 25 mg oral tablet: 1 tab(s) orally 2 times a day (02 Jul 2024 19:17)  sertraline 25 mg oral tablet: 1 tab(s) orally once a day (at bedtime) (02 Jul 2024 19:17)      MEDICATIONS  (STANDING):  apixaban 2.5 milliGRAM(s) Oral every 12 hours  aspirin enteric coated 81 milliGRAM(s) Oral daily  cadexomer iodine 0.9% Gel 1 Application(s) Topical daily  dextrose 10% Bolus 125 milliLiter(s) IV Bolus once  dextrose 5%. 1000 milliLiter(s) (50 mL/Hr) IV Continuous <Continuous>  dextrose 5%. 1000 milliLiter(s) (100 mL/Hr) IV Continuous <Continuous>  dextrose 50% Injectable 25 Gram(s) IV Push once  dextrose 50% Injectable 12.5 Gram(s) IV Push once  doxazosin 2 milliGRAM(s) Oral at bedtime  famotidine    Tablet 20 milliGRAM(s) Oral daily  furosemide   Injectable 40 milliGRAM(s) IV Push two times a day  glucagon  Injectable 1 milliGRAM(s) IntraMuscular once  insulin glargine Injectable (LANTUS) 5 Unit(s) SubCutaneous at bedtime  insulin lispro (ADMELOG) corrective regimen sliding scale   SubCutaneous three times a day before meals  insulin lispro (ADMELOG) corrective regimen sliding scale   SubCutaneous at bedtime  loratadine 10 milliGRAM(s) Oral daily  memantine 10 milliGRAM(s) Oral two times a day  metoprolol tartrate 12.5 milliGRAM(s) Oral two times a day  pancrelipase  (CREON 24,000 Lipase Units) 1 Capsule(s) Oral two times a day with meals  pantoprazole    Tablet 40 milliGRAM(s) Oral before breakfast  povidone iodine 10% Solution 1 Application(s) Topical daily  QUEtiapine 25 milliGRAM(s) Oral two times a day  sertraline 25 milliGRAM(s) Oral daily  traMADol 25 milliGRAM(s) Oral every 8 hours      TELEMETRY: nsr pac	    ECG:  	  RADIOLOGY:   DIAGNOSTIC TESTING:  [ ] Echocardiogram:  [ ]  Catheterization:  [ ] Stress Test:    OTHER: 	    LABS:	 	    Troponin T, High Sensitivity Result: 63 ng/L [0 - 51] (07-02 @ 15:51)  Troponin T, High Sensitivity Result: 64 ng/L [0 - 51] (07-02 @ 13:57)      07-06    134<L>  |  97  |  22  ----------------------------<  153<H>  3.5   |  31  |  0.47<L>    Ca    8.0<L>      06 Jul 2024 07:08  Mg     1.6     07-05

## 2024-07-06 NOTE — PHYSICAL THERAPY INITIAL EVALUATION ADULT - PERTINENT HX OF CURRENT PROBLEM, REHAB EVAL
Pt is an 84 year old female with PMH significant for CAD s/p PCI, IDDM2, 2/2 Whipple procedure for pancreatic CA, dementia (baseline AAOx1-2, bedbound, nonverbal).  Pt presents via EMS today for 8-10 days worth of LE swelling. Pt was sent to [facility] by PCP after son in law called 911 after abnormal lab results with a BNP on 6/28 of 2826.  Pt admitted for CHF exacerbation. Pt found to have multiple sacral decubiti.  Dopplers positive for Left popliteal DVT, pt on AC.  Palliative care to discuss GOC.  Pt also with right heel ulcer, and Left heel DTI.

## 2024-07-06 NOTE — PROGRESS NOTE ADULT - SUBJECTIVE AND OBJECTIVE BOX
Patient is a 84y old  Female who presents with a chief complaint of Heart failure    Per chart, pt is an 84 year old female with PMHx CAD s/p PCI, IDDM2, 2/2 Whipple procedure for pancreatic CA, dementia (baseline AAOx1-2, bedbound, nonverbal) presents via EMS today for 8-10 days worth of LE swelling. (05 Jul 2024 12:07)      SUBJECTIVE / OVERNIGHT EVENTS: no new events, wound care following. dc planning. ptn is full code. remains on IV diuretics for anasarca    MEDICATIONS  (STANDING):  apixaban 2.5 milliGRAM(s) Oral every 12 hours  aspirin enteric coated 81 milliGRAM(s) Oral daily  cadexomer iodine 0.9% Gel 1 Application(s) Topical daily  dextrose 10% Bolus 125 milliLiter(s) IV Bolus once  dextrose 5%. 1000 milliLiter(s) (50 mL/Hr) IV Continuous <Continuous>  dextrose 5%. 1000 milliLiter(s) (100 mL/Hr) IV Continuous <Continuous>  dextrose 50% Injectable 25 Gram(s) IV Push once  dextrose 50% Injectable 12.5 Gram(s) IV Push once  doxazosin 2 milliGRAM(s) Oral at bedtime  famotidine    Tablet 20 milliGRAM(s) Oral daily  furosemide   Injectable 40 milliGRAM(s) IV Push two times a day  glucagon  Injectable 1 milliGRAM(s) IntraMuscular once  insulin glargine Injectable (LANTUS) 5 Unit(s) SubCutaneous at bedtime  insulin lispro (ADMELOG) corrective regimen sliding scale   SubCutaneous at bedtime  insulin lispro (ADMELOG) corrective regimen sliding scale   SubCutaneous three times a day before meals  loratadine 10 milliGRAM(s) Oral daily  memantine 10 milliGRAM(s) Oral two times a day  metoprolol tartrate 12.5 milliGRAM(s) Oral two times a day  pancrelipase  (CREON 24,000 Lipase Units) 1 Capsule(s) Oral two times a day with meals  pantoprazole    Tablet 40 milliGRAM(s) Oral before breakfast  povidone iodine 10% Solution 1 Application(s) Topical daily  QUEtiapine 25 milliGRAM(s) Oral two times a day  sertraline 25 milliGRAM(s) Oral daily  traMADol 25 milliGRAM(s) Oral every 8 hours    MEDICATIONS  (PRN):  acetaminophen     Tablet .. 650 milliGRAM(s) Oral every 6 hours PRN Mild Pain (1 - 3)  dextrose Oral Gel 15 Gram(s) Oral once PRN Blood Glucose LESS THAN 70 milliGRAM(s)/deciliter      Vital Signs Last 24 Hrs  T(F): 98.3 (07-06-24 @ 12:30), Max: 98.8 (07-05-24 @ 19:17)  HR: 97 (07-06-24 @ 12:30) (97 - 113)  BP: 108/69 (07-06-24 @ 12:30) (105/59 - 122/73)  RR: 18 (07-06-24 @ 12:30) (18 - 18)  SpO2: 93% (07-06-24 @ 12:30) (92% - 94%)  Telemetry:   CAPILLARY BLOOD GLUCOSE      POCT Blood Glucose.: 77 mg/dL (06 Jul 2024 11:47)  POCT Blood Glucose.: 153 mg/dL (06 Jul 2024 08:06)  POCT Blood Glucose.: 247 mg/dL (05 Jul 2024 21:18)  POCT Blood Glucose.: 230 mg/dL (05 Jul 2024 16:58)    I&O's Summary    05 Jul 2024 07:01  -  06 Jul 2024 07:00  --------------------------------------------------------  IN: 360 mL / OUT: 1620 mL / NET: -1260 mL    06 Jul 2024 07:01  -  06 Jul 2024 15:30  --------------------------------------------------------  IN: 208 mL / OUT: 800 mL / NET: -592 mL        PHYSICAL EXAM:  GENERAL: NAD, well-developed  HEAD:  Atraumatic, Normocephalic  EYES: EOMI, PERRLA, conjunctiva and sclera clear  NECK: Supple, No JVD  CHEST/LUNG: Clear to auscultation bilaterally; No wheeze  HEART: Regular rate and rhythm; No murmurs, rubs, or gallops  ABDOMEN: Soft, Nontender, Nondistended; Bowel sounds present  EXTREMITIES:  2+ Peripheral Pulses, No clubbing, cyanosis, or edema  PSYCH: AAOx3  NEUROLOGY: non-focal  SKIN: No rashes or lesions    LABS:    07-06    134<L>  |  97  |  22  ----------------------------<  153<H>  3.5   |  31  |  0.47<L>    Ca    8.0<L>      06 Jul 2024 07:08  Mg     1.6     07-05            Urinalysis Basic - ( 06 Jul 2024 07:08 )    Color: x / Appearance: x / SG: x / pH: x  Gluc: 153 mg/dL / Ketone: x  / Bili: x / Urobili: x   Blood: x / Protein: x / Nitrite: x   Leuk Esterase: x / RBC: x / WBC x   Sq Epi: x / Non Sq Epi: x / Bacteria: x        RADIOLOGY & ADDITIONAL TESTS:    Imaging Personally Reviewed:    Consultant(s) Notes Reviewed:      Care Discussed with Consultants/Other Providers:

## 2024-07-07 LAB
ANION GAP SERPL CALC-SCNC: 13 MMOL/L — SIGNIFICANT CHANGE UP (ref 5–17)
BUN SERPL-MCNC: 22 MG/DL — SIGNIFICANT CHANGE UP (ref 7–23)
CALCIUM SERPL-MCNC: 8.2 MG/DL — LOW (ref 8.4–10.5)
CHLORIDE SERPL-SCNC: 93 MMOL/L — LOW (ref 96–108)
CO2 SERPL-SCNC: 29 MMOL/L — SIGNIFICANT CHANGE UP (ref 22–31)
CREAT SERPL-MCNC: 0.44 MG/DL — LOW (ref 0.5–1.3)
EGFR: 94 ML/MIN/1.73M2 — SIGNIFICANT CHANGE UP
GLUCOSE BLDC GLUCOMTR-MCNC: 134 MG/DL — HIGH (ref 70–99)
GLUCOSE BLDC GLUCOMTR-MCNC: 153 MG/DL — HIGH (ref 70–99)
GLUCOSE BLDC GLUCOMTR-MCNC: 159 MG/DL — HIGH (ref 70–99)
GLUCOSE BLDC GLUCOMTR-MCNC: 187 MG/DL — HIGH (ref 70–99)
GLUCOSE BLDC GLUCOMTR-MCNC: 257 MG/DL — HIGH (ref 70–99)
GLUCOSE SERPL-MCNC: 144 MG/DL — HIGH (ref 70–99)
MAGNESIUM SERPL-MCNC: 1.9 MG/DL — SIGNIFICANT CHANGE UP (ref 1.6–2.6)
PHOSPHATE SERPL-MCNC: 3.4 MG/DL — SIGNIFICANT CHANGE UP (ref 2.5–4.5)
POTASSIUM SERPL-MCNC: 3.4 MMOL/L — LOW (ref 3.5–5.3)
POTASSIUM SERPL-SCNC: 3.4 MMOL/L — LOW (ref 3.5–5.3)
SODIUM SERPL-SCNC: 135 MMOL/L — SIGNIFICANT CHANGE UP (ref 135–145)

## 2024-07-07 RX ORDER — POTASSIUM CHLORIDE 600 MG/1
10 TABLET, FILM COATED, EXTENDED RELEASE ORAL
Refills: 0 | Status: COMPLETED | OUTPATIENT
Start: 2024-07-07 | End: 2024-07-07

## 2024-07-07 RX ADMIN — Medication 20 MILLIGRAM(S): at 09:02

## 2024-07-07 RX ADMIN — Medication 37.5 MILLIGRAM(S): at 09:01

## 2024-07-07 RX ADMIN — MEMANTINE HYDROCHLORIDE 10 MILLIGRAM(S): 7 CAPSULE, EXTENDED RELEASE ORAL at 17:39

## 2024-07-07 RX ADMIN — Medication 37.5 MILLIGRAM(S): at 17:39

## 2024-07-07 RX ADMIN — Medication 1 CAPSULE(S): at 09:00

## 2024-07-07 RX ADMIN — Medication 1 CAPSULE(S): at 17:40

## 2024-07-07 RX ADMIN — TRAMADOL HYDROCHLORIDE 25 MILLIGRAM(S): 50 TABLET, FILM COATED ORAL at 13:08

## 2024-07-07 RX ADMIN — TRAMADOL HYDROCHLORIDE 25 MILLIGRAM(S): 50 TABLET, FILM COATED ORAL at 22:55

## 2024-07-07 RX ADMIN — Medication 1 APPLICATION(S): at 08:59

## 2024-07-07 RX ADMIN — FUROSEMIDE 40 MILLIGRAM(S): 10 INJECTION, SOLUTION INTRAMUSCULAR; INTRAVENOUS at 06:15

## 2024-07-07 RX ADMIN — POTASSIUM CHLORIDE 100 MILLIEQUIVALENT(S): 600 TABLET, FILM COATED, EXTENDED RELEASE ORAL at 11:38

## 2024-07-07 RX ADMIN — SERTRALINE HYDROCHLORIDE 25 MILLIGRAM(S): 100 TABLET, FILM COATED ORAL at 09:01

## 2024-07-07 RX ADMIN — DOXAZOSIN MESYLATE 2 MILLIGRAM(S): 2 TABLET ORAL at 21:58

## 2024-07-07 RX ADMIN — MEMANTINE HYDROCHLORIDE 10 MILLIGRAM(S): 7 CAPSULE, EXTENDED RELEASE ORAL at 06:15

## 2024-07-07 RX ADMIN — ASPIRIN 81 MILLIGRAM(S): 325 TABLET, FILM COATED ORAL at 09:01

## 2024-07-07 RX ADMIN — TRAMADOL HYDROCHLORIDE 25 MILLIGRAM(S): 50 TABLET, FILM COATED ORAL at 21:58

## 2024-07-07 RX ADMIN — APIXABAN 2.5 MILLIGRAM(S): 5 TABLET, FILM COATED ORAL at 09:02

## 2024-07-07 RX ADMIN — POTASSIUM CHLORIDE 100 MILLIEQUIVALENT(S): 600 TABLET, FILM COATED, EXTENDED RELEASE ORAL at 08:59

## 2024-07-07 RX ADMIN — INSULIN LISPRO 1: 100 INJECTION, SOLUTION SUBCUTANEOUS at 17:37

## 2024-07-07 RX ADMIN — INSULIN GLARGINE 5 UNIT(S): 100 INJECTION, SOLUTION SUBCUTANEOUS at 21:58

## 2024-07-07 RX ADMIN — POTASSIUM CHLORIDE 100 MILLIEQUIVALENT(S): 600 TABLET, FILM COATED, EXTENDED RELEASE ORAL at 10:13

## 2024-07-07 RX ADMIN — APIXABAN 2.5 MILLIGRAM(S): 5 TABLET, FILM COATED ORAL at 17:40

## 2024-07-07 RX ADMIN — LORATADINE 10 MILLIGRAM(S): 10 TABLET ORAL at 09:02

## 2024-07-07 RX ADMIN — INSULIN LISPRO 3: 100 INJECTION, SOLUTION SUBCUTANEOUS at 12:48

## 2024-07-07 NOTE — PROVIDER CONTACT NOTE (OTHER) - BACKGROUND
pt came in for chf flare up
84F PMH CAD s/p PCI, IDDM2, 2/2 Whipple procedure for pancreatic CA and dementia
Pt admitted for CHF exacerbation.
Pt came in for LE swelling and CHF exacerbation
Telemetry showing PAT of 150 bpm for 3.7 seconds but is currently sinus rhythm 84 w/pvc
Pt came in for LE swelling and CHF exacerbation
pt came in for chf flare up

## 2024-07-07 NOTE — PROGRESS NOTE ADULT - SUBJECTIVE AND OBJECTIVE BOX
CARDIOLOGY FOLLOW UP - Dr. Phelps  DATE OF SERVICE: 7/7/24    CC n acute cv events       REVIEW OF SYSTEMS:  hanane given mental status     PHYSICAL EXAM:  T(C): 36.9 (07-07-24 @ 04:51), Max: 37 (07-06-24 @ 20:32)  HR: 102 (07-07-24 @ 05:24) (84 - 102)  BP: 107/75 (07-07-24 @ 05:24) (92/56 - 115/70)  RR: 18 (07-07-24 @ 04:51) (18 - 18)  SpO2: 95% (07-07-24 @ 04:51) (93% - 95%)  Wt(kg): --  I&O's Summary    06 Jul 2024 07:01  -  07 Jul 2024 07:00  --------------------------------------------------------  IN: 208 mL / OUT: 1700 mL / NET: -1492 mL      Cardiovascular: Normal S1 S2,RRR, No JVD, No murmurs  Respiratory: Lungs clear to auscultation	  Gastrointestinal:  Soft, Non-tender, + BS	  Extremities: Normal range of motion, No clubbing, cyanosis or edema      Home Medications:  aspirin 81 mg oral delayed release tablet: 1 tab(s) orally once a day (02 Jul 2024 19:15)  Creon 24,000 units oral delayed release capsule: 1 cap(s) orally 2 times a day (before meals) (02 Jul 2024 19:17)  doxazosin 2 mg oral tablet: 1 tab(s) orally once a day (at bedtime) (02 Jul 2024 19:17)  famotidine 20 mg oral tablet: 1 tab(s) orally once a day (02 Jul 2024 19:17)  HumaLOG 100 units/mL subcutaneous solution: 2 unit(s) subcutaneous 3 times a day (before meals) as per sliding scale (02 Jul 2024 19:17)  Lantus 100 units/mL subcutaneous solution: 4 unit(s) subcutaneous once a day (02 Jul 2024 19:17)  loratadine 10 mg oral tablet: 1 tab(s) orally once a day (02 Jul 2024 19:14)  methenamine hippurate 1 g oral tablet: 1 tab(s) orally 2 times a day (02 Jul 2024 19:17)  metoprolol tartrate 25 mg oral tablet: 0.5 tab(s) orally 2 times a day (02 Jul 2024 19:17)  Namenda 10 mg oral tablet: 1 tab(s) orally 2 times a day (02 Jul 2024 19:17)  NexIUM 24HR 20 mg oral delayed release tablet: 1 tab(s) orally once a day (02 Jul 2024 19:14)  otc supplement(s): calcium 1g / vitamin d3 1000IU / ferrous sulfate 325mg / vitamin c 100mg / vitamin b12 1000mcg (02 Jul 2024 19:14)  QUEtiapine 25 mg oral tablet: 1 tab(s) orally 2 times a day (02 Jul 2024 19:17)  sertraline 25 mg oral tablet: 1 tab(s) orally once a day (at bedtime) (02 Jul 2024 19:17)      MEDICATIONS  (STANDING):  apixaban 2.5 milliGRAM(s) Oral every 12 hours  aspirin enteric coated 81 milliGRAM(s) Oral daily  cadexomer iodine 0.9% Gel 1 Application(s) Topical daily  dextrose 10% Bolus 125 milliLiter(s) IV Bolus once  dextrose 5%. 1000 milliLiter(s) (50 mL/Hr) IV Continuous <Continuous>  dextrose 5%. 1000 milliLiter(s) (100 mL/Hr) IV Continuous <Continuous>  dextrose 50% Injectable 12.5 Gram(s) IV Push once  dextrose 50% Injectable 25 Gram(s) IV Push once  doxazosin 2 milliGRAM(s) Oral at bedtime  famotidine    Tablet 20 milliGRAM(s) Oral daily  furosemide   Injectable 40 milliGRAM(s) IV Push two times a day  glucagon  Injectable 1 milliGRAM(s) IntraMuscular once  insulin glargine Injectable (LANTUS) 5 Unit(s) SubCutaneous at bedtime  insulin lispro (ADMELOG) corrective regimen sliding scale   SubCutaneous three times a day before meals  insulin lispro (ADMELOG) corrective regimen sliding scale   SubCutaneous at bedtime  loratadine 10 milliGRAM(s) Oral daily  memantine 10 milliGRAM(s) Oral two times a day  metoprolol tartrate 37.5 milliGRAM(s) Oral two times a day  pancrelipase  (CREON 24,000 Lipase Units) 1 Capsule(s) Oral two times a day with meals  pantoprazole    Tablet 40 milliGRAM(s) Oral before breakfast  potassium chloride   Powder 20 milliEquivalent(s) Oral every 2 hours  potassium chloride  10 mEq/100 mL IVPB 10 milliEquivalent(s) IV Intermittent every 1 hour  povidone iodine 10% Solution 1 Application(s) Topical daily  QUEtiapine 25 milliGRAM(s) Oral two times a day  sertraline 25 milliGRAM(s) Oral daily  traMADol 25 milliGRAM(s) Oral every 8 hours      TELEMETRY: 	    ECG:  	  RADIOLOGY:   DIAGNOSTIC TESTING:  [ ] Echocardiogram:  [ ]  Catheterization:  [ ] Stress Test:    OTHER: 	    LABS:	 	    Troponin T, High Sensitivity Result: 63 ng/L [0 - 51] (07-02 @ 15:51)  Troponin T, High Sensitivity Result: 64 ng/L [0 - 51] (07-02 @ 13:57)      07-07    135  |  93<L>  |  22  ----------------------------<  144<H>  3.4<L>   |  29  |  0.44<L>    Ca    8.2<L>      07 Jul 2024 07:02  Phos  3.4     07-07  Mg     1.9     07-07

## 2024-07-07 NOTE — PROGRESS NOTE ADULT - SUBJECTIVE AND OBJECTIVE BOX
Patient is a 84y old  Female who presents with a chief complaint of Heart failure    Per chart, pt is an 84 year old female with PMHx CAD s/p PCI, IDDM2, 2/2 Whipple procedure for pancreatic CA, dementia (baseline AAOx1-2, bedbound, nonverbal) presents via EMS today for 8-10 days worth of LE swelling. (05 Jul 2024 12:07)      SUBJECTIVE / OVERNIGHT EVENTS: no new events, now off IV Lasix, will start po lasix in am as per cardiology    MEDICATIONS  (STANDING):  apixaban 2.5 milliGRAM(s) Oral every 12 hours  aspirin enteric coated 81 milliGRAM(s) Oral daily  cadexomer iodine 0.9% Gel 1 Application(s) Topical daily  dextrose 10% Bolus 125 milliLiter(s) IV Bolus once  dextrose 5%. 1000 milliLiter(s) (50 mL/Hr) IV Continuous <Continuous>  dextrose 5%. 1000 milliLiter(s) (100 mL/Hr) IV Continuous <Continuous>  dextrose 50% Injectable 25 Gram(s) IV Push once  dextrose 50% Injectable 12.5 Gram(s) IV Push once  doxazosin 2 milliGRAM(s) Oral at bedtime  famotidine    Tablet 20 milliGRAM(s) Oral daily  glucagon  Injectable 1 milliGRAM(s) IntraMuscular once  insulin glargine Injectable (LANTUS) 5 Unit(s) SubCutaneous at bedtime  insulin lispro (ADMELOG) corrective regimen sliding scale   SubCutaneous three times a day before meals  insulin lispro (ADMELOG) corrective regimen sliding scale   SubCutaneous at bedtime  loratadine 10 milliGRAM(s) Oral daily  memantine 10 milliGRAM(s) Oral two times a day  metoprolol tartrate 37.5 milliGRAM(s) Oral two times a day  pancrelipase  (CREON 24,000 Lipase Units) 1 Capsule(s) Oral two times a day with meals  pantoprazole    Tablet 40 milliGRAM(s) Oral before breakfast  potassium chloride   Powder 20 milliEquivalent(s) Oral every 2 hours  povidone iodine 10% Solution 1 Application(s) Topical daily  QUEtiapine 25 milliGRAM(s) Oral two times a day  sertraline 25 milliGRAM(s) Oral daily  traMADol 25 milliGRAM(s) Oral every 8 hours    MEDICATIONS  (PRN):  acetaminophen     Tablet .. 650 milliGRAM(s) Oral every 6 hours PRN Mild Pain (1 - 3)  dextrose Oral Gel 15 Gram(s) Oral once PRN Blood Glucose LESS THAN 70 milliGRAM(s)/deciliter      Vital Signs Last 24 Hrs  T(F): 98 (07-07-24 @ 20:20), Max: 98.5 (07-07-24 @ 04:51)  HR: 68 (07-07-24 @ 20:20) (68 - 102)  BP: 114/69 (07-07-24 @ 20:20) (92/56 - 114/69)  RR: 18 (07-07-24 @ 20:20) (18 - 18)  SpO2: 96% (07-07-24 @ 20:20) (95% - 96%)  Telemetry:   CAPILLARY BLOOD GLUCOSE      POCT Blood Glucose.: 153 mg/dL (07 Jul 2024 21:40)  POCT Blood Glucose.: 187 mg/dL (07 Jul 2024 17:08)  POCT Blood Glucose.: 257 mg/dL (07 Jul 2024 12:23)  POCT Blood Glucose.: 134 mg/dL (07 Jul 2024 08:18)  POCT Blood Glucose.: 159 mg/dL (07 Jul 2024 05:20)    I&O's Summary    06 Jul 2024 07:01  -  07 Jul 2024 07:00  --------------------------------------------------------  IN: 208 mL / OUT: 1700 mL / NET: -1492 mL    07 Jul 2024 07:01  -  07 Jul 2024 22:26  --------------------------------------------------------  IN: 0 mL / OUT: 800 mL / NET: -800 mL        PHYSICAL EXAM:  GENERAL: NAD, well-developed  HEAD:  Atraumatic, Normocephalic  EYES: EOMI, PERRLA, conjunctiva and sclera clear  NECK: Supple, No JVD  CHEST/LUNG: Clear to auscultation bilaterally; No wheeze  HEART: Regular rate and rhythm; No murmurs, rubs, or gallops  ABDOMEN: Soft, Nontender, Nondistended; Bowel sounds present  EXTREMITIES:  2+ Peripheral Pulses, No clubbing, cyanosis, or edema  PSYCH: AAOx3  NEUROLOGY: non-focal  SKIN: No rashes or lesions    LABS:    07-07    135  |  93<L>  |  22  ----------------------------<  144<H>  3.4<L>   |  29  |  0.44<L>    Ca    8.2<L>      07 Jul 2024 07:02  Phos  3.4     07-07  Mg     1.9     07-07            Urinalysis Basic - ( 07 Jul 2024 07:02 )    Color: x / Appearance: x / SG: x / pH: x  Gluc: 144 mg/dL / Ketone: x  / Bili: x / Urobili: x   Blood: x / Protein: x / Nitrite: x   Leuk Esterase: x / RBC: x / WBC x   Sq Epi: x / Non Sq Epi: x / Bacteria: x        RADIOLOGY & ADDITIONAL TESTS:    Imaging Personally Reviewed:    Consultant(s) Notes Reviewed:      Care Discussed with Consultants/Other Providers:

## 2024-07-07 NOTE — PROVIDER CONTACT NOTE (OTHER) - DATE AND TIME:
07-Jul-2024 06:30
03-Jul-2024 01:10
05-Jul-2024 02:11
04-Jul-2024 06:00
06-Jul-2024 22:30
05-Jul-2024 02:59
05-Jul-2024 04:38

## 2024-07-07 NOTE — PROVIDER CONTACT NOTE (OTHER) - ASSESSMENT
Pt A&O 0, non verbal, in no acute signs of distress. No s/s of chest pain noted.
Pt A0x1 hypophonic. No s/s of distress noted at this time.
pt A&Ox0 and nonverbal and bedbound. VSS. No s/s of cp, dizziness or sob or other discomfort. Safety checks completed every hour. Patient safety maintained. IV site assessed and remains WDL.
Pt A&O 0, non verbal, in no acute signs of distress. No s/s of chest pain noted. /68, HR 98, 94% on room air, temperature 97.5
pt did not take po meds, wouldn't open mouth to take them
Pt daughter at bedside asking mother of any palpitation or chest pain but denies non-verbal methods. Pt is axo1, no palpitations on auscultation, no shortness of breath or any symptoms of chest pain.
pt did not take po meds, said "no" when trying to medicate her. only word she said to RN during whole shift, and IV blew when trying to give iv potasisum

## 2024-07-07 NOTE — PROVIDER CONTACT NOTE (OTHER) - REASON
Pt having frequent short bursts of PAT lasting 1-2 seconds
Pt had PAT x 1.6 secs . Pt had history.
Pt having PATs for 3.4 secs
PAT of 150 bpm for 3.7 seconds
pt did not take po meds
pt didn't take po meds again + iv blew before giving iv potassium
pt had a PAT lasting 1.6 seconds HR up to 130bpm
No

## 2024-07-07 NOTE — PROVIDER CONTACT NOTE (OTHER) - ACTION/TREATMENT ORDERED:
provider is notified and aware. provider instructs to continue w/ beta blockers, follow up w/ BMP in the morning and add magnesium
Provider made aware, no new orders at this time.
Provider notified, Mg and Potassium supplementation ordered
acp made aware
acp made aware
Provider made aware, BMP and Mg ordered
Provider made aware and notified. Pt morning blood pressure medication will be given

## 2024-07-07 NOTE — PROVIDER CONTACT NOTE (OTHER) - RECOMMENDATIONS
acp made aware
Notify provider
Notify provider
Notify provider.
Provider made aware and notified. Pt morning blood pressure medication will be given
acp made aware
notify provider

## 2024-07-07 NOTE — PROVIDER CONTACT NOTE (OTHER) - SITUATION
Pt having frequent short bursts of PAT lasting 1-2 seconds
Telemetry showing PAT of 150 bpm for 3.7 seconds but is currently sinus rhythm 84 w/pvc
Pt having PATs for 3.4 secs
pt had a PAT lasting 1.6 seconds HR up to 130bpm
Pt had PAT x 1.6 secs . Pt had history.
pt did not take po meds
pt didn't take po meds

## 2024-07-08 LAB
ANION GAP SERPL CALC-SCNC: 9 MMOL/L — SIGNIFICANT CHANGE UP (ref 5–17)
BUN SERPL-MCNC: 22 MG/DL — SIGNIFICANT CHANGE UP (ref 7–23)
CALCIUM SERPL-MCNC: 8.2 MG/DL — LOW (ref 8.4–10.5)
CHLORIDE SERPL-SCNC: 96 MMOL/L — SIGNIFICANT CHANGE UP (ref 96–108)
CO2 SERPL-SCNC: 29 MMOL/L — SIGNIFICANT CHANGE UP (ref 22–31)
CREAT SERPL-MCNC: 0.48 MG/DL — LOW (ref 0.5–1.3)
EGFR: 92 ML/MIN/1.73M2 — SIGNIFICANT CHANGE UP
GLUCOSE BLDC GLUCOMTR-MCNC: 169 MG/DL — HIGH (ref 70–99)
GLUCOSE BLDC GLUCOMTR-MCNC: 175 MG/DL — HIGH (ref 70–99)
GLUCOSE BLDC GLUCOMTR-MCNC: 210 MG/DL — HIGH (ref 70–99)
GLUCOSE BLDC GLUCOMTR-MCNC: 71 MG/DL — SIGNIFICANT CHANGE UP (ref 70–99)
GLUCOSE SERPL-MCNC: 59 MG/DL — LOW (ref 70–99)
MAGNESIUM SERPL-MCNC: 1.8 MG/DL — SIGNIFICANT CHANGE UP (ref 1.6–2.6)
PHOSPHATE SERPL-MCNC: 2.7 MG/DL — SIGNIFICANT CHANGE UP (ref 2.5–4.5)
POTASSIUM SERPL-MCNC: 4 MMOL/L — SIGNIFICANT CHANGE UP (ref 3.5–5.3)
POTASSIUM SERPL-SCNC: 4 MMOL/L — SIGNIFICANT CHANGE UP (ref 3.5–5.3)
SODIUM SERPL-SCNC: 134 MMOL/L — LOW (ref 135–145)

## 2024-07-08 PROCEDURE — 99233 SBSQ HOSP IP/OBS HIGH 50: CPT

## 2024-07-08 RX ORDER — FUROSEMIDE 10 MG/ML
20 INJECTION, SOLUTION INTRAMUSCULAR; INTRAVENOUS DAILY
Refills: 0 | Status: DISCONTINUED | OUTPATIENT
Start: 2024-07-08 | End: 2024-07-09

## 2024-07-08 RX ADMIN — Medication 1 APPLICATION(S): at 09:23

## 2024-07-08 RX ADMIN — TRAMADOL HYDROCHLORIDE 25 MILLIGRAM(S): 50 TABLET, FILM COATED ORAL at 06:20

## 2024-07-08 RX ADMIN — INSULIN LISPRO 1: 100 INJECTION, SOLUTION SUBCUTANEOUS at 12:12

## 2024-07-08 RX ADMIN — INSULIN GLARGINE 5 UNIT(S): 100 INJECTION, SOLUTION SUBCUTANEOUS at 21:11

## 2024-07-08 RX ADMIN — ASPIRIN 81 MILLIGRAM(S): 325 TABLET, FILM COATED ORAL at 09:22

## 2024-07-08 RX ADMIN — Medication 1 CAPSULE(S): at 09:22

## 2024-07-08 RX ADMIN — APIXABAN 2.5 MILLIGRAM(S): 5 TABLET, FILM COATED ORAL at 05:13

## 2024-07-08 RX ADMIN — TRAMADOL HYDROCHLORIDE 25 MILLIGRAM(S): 50 TABLET, FILM COATED ORAL at 21:11

## 2024-07-08 RX ADMIN — PANTOPRAZOLE SODIUM 40 MILLIGRAM(S): 40 INJECTION, POWDER, FOR SOLUTION INTRAVENOUS at 05:13

## 2024-07-08 RX ADMIN — TRAMADOL HYDROCHLORIDE 25 MILLIGRAM(S): 50 TABLET, FILM COATED ORAL at 05:12

## 2024-07-08 RX ADMIN — MEMANTINE HYDROCHLORIDE 10 MILLIGRAM(S): 7 CAPSULE, EXTENDED RELEASE ORAL at 17:30

## 2024-07-08 RX ADMIN — MEMANTINE HYDROCHLORIDE 10 MILLIGRAM(S): 7 CAPSULE, EXTENDED RELEASE ORAL at 05:13

## 2024-07-08 RX ADMIN — Medication 37.5 MILLIGRAM(S): at 05:12

## 2024-07-08 RX ADMIN — Medication 1 CAPSULE(S): at 17:29

## 2024-07-08 RX ADMIN — TRAMADOL HYDROCHLORIDE 25 MILLIGRAM(S): 50 TABLET, FILM COATED ORAL at 22:03

## 2024-07-08 RX ADMIN — Medication 37.5 MILLIGRAM(S): at 17:30

## 2024-07-08 RX ADMIN — DOXAZOSIN MESYLATE 2 MILLIGRAM(S): 2 TABLET ORAL at 21:11

## 2024-07-08 RX ADMIN — Medication 20 MILLIGRAM(S): at 09:23

## 2024-07-08 RX ADMIN — INSULIN LISPRO 1: 100 INJECTION, SOLUTION SUBCUTANEOUS at 17:28

## 2024-07-08 RX ADMIN — FUROSEMIDE 20 MILLIGRAM(S): 10 INJECTION, SOLUTION INTRAMUSCULAR; INTRAVENOUS at 13:33

## 2024-07-08 RX ADMIN — Medication 25 MILLIGRAM(S): at 05:13

## 2024-07-08 RX ADMIN — LORATADINE 10 MILLIGRAM(S): 10 TABLET ORAL at 09:22

## 2024-07-08 RX ADMIN — APIXABAN 2.5 MILLIGRAM(S): 5 TABLET, FILM COATED ORAL at 17:30

## 2024-07-08 RX ADMIN — SERTRALINE HYDROCHLORIDE 25 MILLIGRAM(S): 100 TABLET, FILM COATED ORAL at 09:22

## 2024-07-08 RX ADMIN — TRAMADOL HYDROCHLORIDE 25 MILLIGRAM(S): 50 TABLET, FILM COATED ORAL at 13:33

## 2024-07-08 NOTE — PROGRESS NOTE ADULT - SUBJECTIVE AND OBJECTIVE BOX
SUBJECTIVE AND OBJECTIVE:  Indication for Geriatrics and Palliative Care Services/INTERVAL HPI: 85 y/o female w/ PMHx CAD s/p PCI, IDDM2, 2/2 Whipple procedure for pancreatic CA, dementia (baseline AAOx1-2, bedbound, nonverbal) presents via EMS today for 8-10 days worth of  LE swelling. On 2L NC, baseline no oxygen. Caretaker notes that pt has b/l LE swelling, upper b/l arms R>L have been more swollen than usual, and possible abdominal swelling. Notes that pt occasionally has episodes that seems like pt is wheezing for air. Pt was sent into on 7/2  by PCP after son in law called 911 after abnormal lab results with a BNP on 6/28 of 2826. Pt denies PND, orthopnea, dysuria, rashes, trauma, dietary changes, trauma, recent travel or infectious contacts, hx of smoking, asthma, copd. GaP consulted for complex medical decision making in the setting of serious illness and symptoms management     OVERNIGHT EVENTS: No acute events reported overnight. Patient appears comfortable this morning. Tolerating well current pain regimen. Nurse aid reports that patient has been eating less in the last couple of days, sleep most of the day.     DNR on chart:  Allergies    No Known Allergies    Intolerances    MEDICATIONS  (STANDING):  apixaban 2.5 milliGRAM(s) Oral every 12 hours  aspirin enteric coated 81 milliGRAM(s) Oral daily  cadexomer iodine 0.9% Gel 1 Application(s) Topical daily  dextrose 10% Bolus 125 milliLiter(s) IV Bolus once  dextrose 5%. 1000 milliLiter(s) (50 mL/Hr) IV Continuous <Continuous>  dextrose 5%. 1000 milliLiter(s) (100 mL/Hr) IV Continuous <Continuous>  dextrose 50% Injectable 25 Gram(s) IV Push once  dextrose 50% Injectable 12.5 Gram(s) IV Push once  doxazosin 2 milliGRAM(s) Oral at bedtime  famotidine    Tablet 20 milliGRAM(s) Oral daily  furosemide    Tablet 20 milliGRAM(s) Oral daily  glucagon  Injectable 1 milliGRAM(s) IntraMuscular once  insulin glargine Injectable (LANTUS) 5 Unit(s) SubCutaneous at bedtime  insulin lispro (ADMELOG) corrective regimen sliding scale   SubCutaneous at bedtime  insulin lispro (ADMELOG) corrective regimen sliding scale   SubCutaneous three times a day before meals  loratadine 10 milliGRAM(s) Oral daily  memantine 10 milliGRAM(s) Oral two times a day  metoprolol tartrate 37.5 milliGRAM(s) Oral two times a day  pancrelipase  (CREON 24,000 Lipase Units) 1 Capsule(s) Oral two times a day with meals  pantoprazole    Tablet 40 milliGRAM(s) Oral before breakfast  potassium chloride   Powder 20 milliEquivalent(s) Oral every 2 hours  povidone iodine 10% Solution 1 Application(s) Topical daily  QUEtiapine 25 milliGRAM(s) Oral two times a day  sertraline 25 milliGRAM(s) Oral daily  traMADol 25 milliGRAM(s) Oral every 8 hours    MEDICATIONS  (PRN):  acetaminophen     Tablet .. 650 milliGRAM(s) Oral every 6 hours PRN Mild Pain (1 - 3)  dextrose Oral Gel 15 Gram(s) Oral once PRN Blood Glucose LESS THAN 70 milliGRAM(s)/deciliter      ITEMS UNCHECKED ARE NOT PRESENT    PRESENT SYMPTOMS: [ x]Unable to self-report - see  CPOT, PAINADS, RDOS below  Source if other than patient:  [ ]Family   [ ]Team     Pain:  [ ]yes [ ]no  QOL impact -   Location -                    Aggravating factors -  Quality -  Radiation -  Timing-  Severity (0-10 scale):  Minimal acceptable level (0-10 scale):     Dyspnea:                           [ ]Mild [ ]Moderate [ ]Severe  Anxiety:                             [ ]Mild [ ]Moderate [ ]Severe  Fatigue:                             [ ]Mild [ ]Moderate [ ]Severe  Nausea:                             [ ]Mild [ ]Moderate [ ]Severe  Loss of appetite:              [ ]Mild [ ]Moderate [ ]Severe  Constipation:                    [ ]Mild [ ]Moderate [ ]Severe    PCSSQ[Palliative Care Spiritual Screening Question]   Severity (0-10):  Score of 4 or > indicate consideration of Chaplaincy referral.  Chaplaincy Referral: [ ] yes [ ] refused [ ] following  Caregiver [place]? : [ ] yes [ ] no  [ x] deferred:  Social work referral [ ] Patient & Family Centered Care Referral [ ]   Anticipatory Grief present?:  [ ] yes [ ] no  [x ] deferred: Social work referral [ ] Patient & Family Centered Care Referral [ ]      Other Symptoms:  [ ]All other review of systems negative   [x ] Unable to obtain due to poor mentation    PHYSICAL EXAM:  Vital Signs Last 24 Hrs  T(C): 36.4 (08 Jul 2024 12:34), Max: 36.7 (07 Jul 2024 20:20)  T(F): 97.5 (08 Jul 2024 12:34), Max: 98 (07 Jul 2024 20:20)  HR: 74 (08 Jul 2024 13:41) (68 - 75)  BP: 125/76 (08 Jul 2024 13:41) (90/56 - 125/76)  BP(mean): --  RR: 18 (08 Jul 2024 12:34) (18 - 18)  SpO2: 95% (08 Jul 2024 12:34) (95% - 96%)    Parameters below as of 08 Jul 2024 12:34  Patient On (Oxygen Delivery Method): room air    I&O's Summary    07 Jul 2024 07:01  -  08 Jul 2024 07:00  --------------------------------------------------------  IN: 0 mL / OUT: 800 mL / NET: -800 mL    08 Jul 2024 07:01  -  08 Jul 2024 16:17  --------------------------------------------------------  IN: 0 mL / OUT: 300 mL / NET: -300 mL      GENERAL: [x]Alert  [x]Oriented x 0  [ ]Lethargic  [ ]Cachexia  [ ]Unarousable  []Verbal  [ x]Non-Verbal  Behavioral:   [ ]Anxiety  [ ]Delirium [x ]Agitation [ ]Other  HEENT:  [x]Normal   [ ]Dry mouth   [ ]ET Tube/Trach  [ ]Oral lesions  PULMONARY:   [x]Clear [ ]Tachypnea  [ ]Audible excessive secretions   [ ]Rhonchi        [ ]Right [ ]Left [ ]Bilateral  [ ]Crackles        [ ]Right [ ]Left [ ]Bilateral  [ ]Wheezing     [ ]Right [ ]Left [ ]Bilateral  [ ]Diminished BS [ ] Right [ ]Left [ ]Bilateral  CARDIOVASCULAR:    [x]Regular [ ]Irregular [ ]Tachy  [ ]Rusty [ ]Murmur [ ]Other  GASTROINTESTINAL:  [x]Soft  [ ]Distended   [x]+BS  [x]Non tender [ ]Tender  [ ]PEG [ ]OGT/ NGT   Last BM:  07/08/24 fecal incontinent   GENITOURINARY:  [x]Normal [x ]Incontinent   [ ]Oliguria/Anuria   [ ][place]  MUSCULOSKELETAL:   [ ]Normal   [x]Weakness  [x ]Bed/Wheelchair bound [ ]Edema  NEUROLOGIC:   [x]No focal deficits  [ x] Cognitive impairment  [ ] Dysphagia [ ]Dysarthria [ ] Paresis [ ]Other   SKIN:   []Normal  [ ]Rash   [x ]Pressure ulcer(s) [x ]y [ ]n present on admission. Please see RN documentation which I have reviewed.      CRITICAL CARE:  [ ] Shock Present  [ ]Septic [ ]Cardiogenic [ ]Neurologic [ ]Hypovolemic  [ ]  Vasopressors [ ]  Inotropes   [ ]Respiratory failure present [ ]Mechanical ventilation [ ]Non-invasive ventilatory support [ ]High flow    [ ]Acute  [ ]Chronic [ ]Hypoxic  [ ]Hypercarbic [ ]Other  [x ]Other organ failure : brain and skin       LABS:  07-08    134<L>  |  96  |  22  ----------------------------<  59<L>  4.0   |  29  |  0.48<L>    Ca    8.2<L>      08 Jul 2024 07:57  Phos  2.7     07-08  Mg     1.8     07-08        Urinalysis Basic - ( 08 Jul 2024 07:57 )    Color: x / Appearance: x / SG: x / pH: x  Gluc: 59 mg/dL / Ketone: x  / Bili: x / Urobili: x   Blood: x / Protein: x / Nitrite: x   Leuk Esterase: x / RBC: x / WBC x   Sq Epi: x / Non Sq Epi: x / Bacteria: x      RADIOLOGY & ADDITIONAL STUDIES: none new     Protein Calorie Malnutrition Present: [ ]mild [ ]moderate [ ]severe [ ]underweight [ ]morbid obesity  https://www.andeal.org/vault/8520/web/files/ONC/Table_Clinical%20Characteristics%20to%20Document%20Malnutrition-White%20JV%20et%20al%202012.pdf      Weight (kg): 40.8 (07-02-24 @ 12:31), 56.8 (04-04-24 @ 20:34)    [ ]PPSV2 < or = 30%  [ ]significant weight loss [ ]poor nutritional intake [ ]anasarca[ ]Artificial Nutrition    Other REFERRALS:  [ ]Hospice  [ ]Child Life  [ ]Social Work  [ x]Case management [ ]Holistic Therapy     Palliative Performance Scale:  http://npcrc.org/files/news/palliative_performance_scale_ppsv2.pdf  (Ctrl +  left click to view)  Respiratory Distress Observation Tool:  https://homecareinformation.net/handouts/hen/Respiratory_Distress_Observation_Scale.pdf (Ctrl +  left click to view)  PAINAD Score:  http://geriatrictoolkit.[place].[facility]/cog/painad.pdf (Ctrl +  left click to view)

## 2024-07-08 NOTE — PROGRESS NOTE ADULT - SUBJECTIVE AND OBJECTIVE BOX
CARDIOLOGY FOLLOW UP - Dr. Phelps  DATE OF SERVICE: 7/8/24    CC  No acute cv events     REVIEW OF SYSTEMS:  UTO     PHYSICAL EXAM:  T(C): 36.7 (07-08-24 @ 05:07), Max: 36.7 (07-07-24 @ 20:20)  HR: 75 (07-08-24 @ 05:07) (68 - 94)  BP: 104/64 (07-08-24 @ 05:07) (101/58 - 114/69)  RR: 18 (07-08-24 @ 05:07) (18 - 18)  SpO2: 95% (07-08-24 @ 05:07) (95% - 96%)  Wt(kg): --  I&O's Summary    07 Jul 2024 07:01  -  08 Jul 2024 07:00  --------------------------------------------------------  IN: 0 mL / OUT: 800 mL / NET: -800 mL        Appearance: Elderly female 	  Cardiovascular: Normal S1 S2,RRR, No JVD, No murmurs  Respiratory: Lungs clear to auscultation b/l   Gastrointestinal:  Soft, Non-tender, + BS	  Extremities: Normal range of motion, No clubbing, cyanosis or edema      Home Medications:  aspirin 81 mg oral delayed release tablet: 1 tab(s) orally once a day (02 Jul 2024 19:15)  Creon 24,000 units oral delayed release capsule: 1 cap(s) orally 2 times a day (before meals) (02 Jul 2024 19:17)  doxazosin 2 mg oral tablet: 1 tab(s) orally once a day (at bedtime) (02 Jul 2024 19:17)  famotidine 20 mg oral tablet: 1 tab(s) orally once a day (02 Jul 2024 19:17)  HumaLOG 100 units/mL subcutaneous solution: 2 unit(s) subcutaneous 3 times a day (before meals) as per sliding scale (02 Jul 2024 19:17)  Lantus 100 units/mL subcutaneous solution: 4 unit(s) subcutaneous once a day (02 Jul 2024 19:17)  loratadine 10 mg oral tablet: 1 tab(s) orally once a day (02 Jul 2024 19:14)  methenamine hippurate 1 g oral tablet: 1 tab(s) orally 2 times a day (02 Jul 2024 19:17)  metoprolol tartrate 25 mg oral tablet: 0.5 tab(s) orally 2 times a day (02 Jul 2024 19:17)  Namenda 10 mg oral tablet: 1 tab(s) orally 2 times a day (02 Jul 2024 19:17)  NexIUM 24HR 20 mg oral delayed release tablet: 1 tab(s) orally once a day (02 Jul 2024 19:14)  otc supplement(s): calcium 1g / vitamin d3 1000IU / ferrous sulfate 325mg / vitamin c 100mg / vitamin b12 1000mcg (02 Jul 2024 19:14)  QUEtiapine 25 mg oral tablet: 1 tab(s) orally 2 times a day (02 Jul 2024 19:17)  sertraline 25 mg oral tablet: 1 tab(s) orally once a day (at bedtime) (02 Jul 2024 19:17)      MEDICATIONS  (STANDING):  apixaban 2.5 milliGRAM(s) Oral every 12 hours  aspirin enteric coated 81 milliGRAM(s) Oral daily  cadexomer iodine 0.9% Gel 1 Application(s) Topical daily  dextrose 10% Bolus 125 milliLiter(s) IV Bolus once  dextrose 5%. 1000 milliLiter(s) (50 mL/Hr) IV Continuous <Continuous>  dextrose 5%. 1000 milliLiter(s) (100 mL/Hr) IV Continuous <Continuous>  dextrose 50% Injectable 25 Gram(s) IV Push once  dextrose 50% Injectable 12.5 Gram(s) IV Push once  doxazosin 2 milliGRAM(s) Oral at bedtime  famotidine    Tablet 20 milliGRAM(s) Oral daily  glucagon  Injectable 1 milliGRAM(s) IntraMuscular once  insulin glargine Injectable (LANTUS) 5 Unit(s) SubCutaneous at bedtime  insulin lispro (ADMELOG) corrective regimen sliding scale   SubCutaneous three times a day before meals  insulin lispro (ADMELOG) corrective regimen sliding scale   SubCutaneous at bedtime  loratadine 10 milliGRAM(s) Oral daily  memantine 10 milliGRAM(s) Oral two times a day  metoprolol tartrate 37.5 milliGRAM(s) Oral two times a day  pancrelipase  (CREON 24,000 Lipase Units) 1 Capsule(s) Oral two times a day with meals  pantoprazole    Tablet 40 milliGRAM(s) Oral before breakfast  potassium chloride   Powder 20 milliEquivalent(s) Oral every 2 hours  povidone iodine 10% Solution 1 Application(s) Topical daily  QUEtiapine 25 milliGRAM(s) Oral two times a day  sertraline 25 milliGRAM(s) Oral daily  traMADol 25 milliGRAM(s) Oral every 8 hours      TELEMETRY: 	    ECG:  	  RADIOLOGY:   DIAGNOSTIC TESTING:  [ ] Echocardiogram:  [ ]  Catheterization:  [ ] Stress Test:    OTHER: 	    LABS:	 	    Troponin T, High Sensitivity Result: 63 ng/L [0 - 51] (07-02 @ 15:51)  Troponin T, High Sensitivity Result: 64 ng/L [0 - 51] (07-02 @ 13:57)      07-08    134<L>  |  96  |  22  ----------------------------<  59<L>  4.0   |  29  |  0.48<L>    Ca    8.2<L>      08 Jul 2024 07:57  Phos  2.7     07-08  Mg     1.8     07-08

## 2024-07-08 NOTE — PROGRESS NOTE ADULT - PROBLEM SELECTOR PLAN 7
GaP team consulted for complex medical decision making in the setting of serious illness and symptoms management       Palliative care consult appreciated and completed. Palliative care team will sign off. The team remains available if additional services are needed. Please reconsult as needed. Discussed with the primary team.

## 2024-07-08 NOTE — PROGRESS NOTE ADULT - PROBLEM SELECTOR PLAN 5
-Advise to continue quetiapine 25 mg BID   -Frequent reassurance and verbal orientation   -Family members or other familiar persons by his bedside.   -Delusions and hallucinations should be neither endorsed nor challenged.   -Physical restraints should be avoided. Alternatives to restraint use, such as constant observation (preferably by someone familiar to the patient such as a family member), may be more effective.

## 2024-07-08 NOTE — PHARMACOTHERAPY INTERVENTION NOTE - COMMENTS
Eliquis 2.5 mg prescription sent to [facility]. The cost is $0/month with patient's insurance plan. Prescription will be filed and put on hold.     Call [facility] when patient is closer to discharge and request prescription to be delivered med(s) to bed before 4pm.    Earl (Danie Aguirre  Transitions of Care Pharmacist  Available on [employer] Teams (Preferred)  Spectra: [phone]   Eliquis 2.5 mg prescription sent to [facility] pharmacy. The cost is $0/month with patient's insurance plan. Prescription will be filed and put on hold. Call [facility] when patient is closer to discharge and request prescription to be delivered med(s) to bed before 4pm.    Provided CareNotes information sheet to patient at bedside. Reached out to daughterShirley to provide medication education on Eliquis. No answer - will try again.     Earl (Danie Aguirre  Transitions of Care Pharmacist  Available on Microsoft Teams (Preferred)  Spectra: [phone]   Eliquis 2.5 mg prescription sent to [facility]. The cost is $0/month with patient's insurance plan. Prescription will be filed and put on hold. Call [facility] when patient is closer to discharge and request prescription to be delivered med(s) to bed before 4pm.    Counseled patient on Eliquis (brand/generic), indication, directions for use (1 tablet (2.5mg) twice daily) and possible side effects (bleeding). Counseled patient on taking acetaminophen over the counter if needed and to avoid NSAID medications like Advil or Aleve since they can increase bleeding risk. Patient was provided with a medication card for their new medication. Patient questions and concerns were answered and addressed. Patient demonstrated understanding. Patient understood importance of compliance and to follow up with cardiologist once discharged.     Earl (Danie Aguirre  Transitions of Care Pharmacist  Available on Microsoft Teams (Preferred)  Spectra: [phone]

## 2024-07-08 NOTE — PROGRESS NOTE ADULT - PROBLEM SELECTOR PLAN 6
- PPSV 30%. The patient requires nursing assistance with all ADLs,  - Supportive care.  - Turn and position.  - Continue with good skin care.
- - -

## 2024-07-08 NOTE — PROGRESS NOTE ADULT - SUBJECTIVE AND OBJECTIVE BOX
Patient is a 84y old  Female who presents with a chief complaint of Heart failure    Per chart, pt is an 84 year old female with PMHx CAD s/p PCI, IDDM2, 2/2 Whipple procedure for pancreatic CA, dementia (baseline AAOx1-2, bedbound, nonverbal) presents via EMS today for 8-10 days worth of LE swelling. (05 Jul 2024 12:07)      SUBJECTIVE / OVERNIGHT EVENTS: no new events, s/p IV diuresis, now on po Lasix. dc planning    MEDICATIONS  (STANDING):  apixaban 2.5 milliGRAM(s) Oral every 12 hours  aspirin enteric coated 81 milliGRAM(s) Oral daily  cadexomer iodine 0.9% Gel 1 Application(s) Topical daily  dextrose 10% Bolus 125 milliLiter(s) IV Bolus once  dextrose 5%. 1000 milliLiter(s) (50 mL/Hr) IV Continuous <Continuous>  dextrose 5%. 1000 milliLiter(s) (100 mL/Hr) IV Continuous <Continuous>  dextrose 50% Injectable 25 Gram(s) IV Push once  dextrose 50% Injectable 12.5 Gram(s) IV Push once  doxazosin 2 milliGRAM(s) Oral at bedtime  famotidine    Tablet 20 milliGRAM(s) Oral daily  furosemide    Tablet 20 milliGRAM(s) Oral daily  glucagon  Injectable 1 milliGRAM(s) IntraMuscular once  insulin glargine Injectable (LANTUS) 5 Unit(s) SubCutaneous at bedtime  insulin lispro (ADMELOG) corrective regimen sliding scale   SubCutaneous at bedtime  insulin lispro (ADMELOG) corrective regimen sliding scale   SubCutaneous three times a day before meals  loratadine 10 milliGRAM(s) Oral daily  memantine 10 milliGRAM(s) Oral two times a day  metoprolol tartrate 37.5 milliGRAM(s) Oral two times a day  pancrelipase  (CREON 24,000 Lipase Units) 1 Capsule(s) Oral two times a day with meals  pantoprazole    Tablet 40 milliGRAM(s) Oral before breakfast  potassium chloride   Powder 20 milliEquivalent(s) Oral every 2 hours  povidone iodine 10% Solution 1 Application(s) Topical daily  QUEtiapine 25 milliGRAM(s) Oral two times a day  sertraline 25 milliGRAM(s) Oral daily  traMADol 25 milliGRAM(s) Oral every 8 hours    MEDICATIONS  (PRN):  acetaminophen     Tablet .. 650 milliGRAM(s) Oral every 6 hours PRN Mild Pain (1 - 3)  dextrose Oral Gel 15 Gram(s) Oral once PRN Blood Glucose LESS THAN 70 milliGRAM(s)/deciliter      Vital Signs Last 24 Hrs  T(F): 98.5 (07-08-24 @ 20:24), Max: 98.5 (07-08-24 @ 20:24)  HR: 77 (07-08-24 @ 20:24) (68 - 77)  BP: 108/72 (07-08-24 @ 20:24) (90/56 - 125/76)  RR: 18 (07-08-24 @ 20:24) (18 - 18)  SpO2: 94% (07-08-24 @ 20:24) (94% - 95%)  Telemetry:   CAPILLARY BLOOD GLUCOSE      POCT Blood Glucose.: 210 mg/dL (08 Jul 2024 21:06)  POCT Blood Glucose.: 169 mg/dL (08 Jul 2024 17:16)  POCT Blood Glucose.: 175 mg/dL (08 Jul 2024 11:56)  POCT Blood Glucose.: 71 mg/dL (08 Jul 2024 08:21)    I&O's Summary    07 Jul 2024 07:01  -  08 Jul 2024 07:00  --------------------------------------------------------  IN: 0 mL / OUT: 800 mL / NET: -800 mL    08 Jul 2024 07:01  -  08 Jul 2024 22:36  --------------------------------------------------------  IN: 0 mL / OUT: 300 mL / NET: -300 mL        PHYSICAL EXAM:  GENERAL: NAD, well-developed  HEAD:  Atraumatic, Normocephalic  EYES: EOMI, PERRLA, conjunctiva and sclera clear  NECK: Supple, No JVD  CHEST/LUNG: Clear to auscultation bilaterally; No wheeze  HEART: Regular rate and rhythm; No murmurs, rubs, or gallops  ABDOMEN: Soft, Nontender, Nondistended; Bowel sounds present  EXTREMITIES:  2+ Peripheral Pulses, No clubbing, cyanosis, or edema  PSYCH: AAOx3  NEUROLOGY: non-focal  SKIN: No rashes or lesions    LABS:    07-08    134<L>  |  96  |  22  ----------------------------<  59<L>  4.0   |  29  |  0.48<L>    Ca    8.2<L>      08 Jul 2024 07:57  Phos  2.7     07-08  Mg     1.8     07-08            Urinalysis Basic - ( 08 Jul 2024 07:57 )    Color: x / Appearance: x / SG: x / pH: x  Gluc: 59 mg/dL / Ketone: x  / Bili: x / Urobili: x   Blood: x / Protein: x / Nitrite: x   Leuk Esterase: x / RBC: x / WBC x   Sq Epi: x / Non Sq Epi: x / Bacteria: x        RADIOLOGY & ADDITIONAL TESTS:    Imaging Personally Reviewed:    Consultant(s) Notes Reviewed:      Care Discussed with Consultants/Other Providers:

## 2024-07-08 NOTE — PROGRESS NOTE ADULT - PROBLEM SELECTOR PLAN 4
- Advised to continue tramadol 25 mg q8hr ATC, given patient is not able to request pain medication due to advance dementia. Daughter and primary team aware.   - Bowel regimen while on opioids.

## 2024-07-09 ENCOUNTER — TRANSCRIPTION ENCOUNTER (OUTPATIENT)
Age: 84
End: 2024-07-09

## 2024-07-09 VITALS
RESPIRATION RATE: 18 BRPM | OXYGEN SATURATION: 95 % | HEART RATE: 98 BPM | TEMPERATURE: 98 F | DIASTOLIC BLOOD PRESSURE: 83 MMHG | SYSTOLIC BLOOD PRESSURE: 128 MMHG

## 2024-07-09 LAB
ANION GAP SERPL CALC-SCNC: 12 MMOL/L — SIGNIFICANT CHANGE UP (ref 5–17)
BUN SERPL-MCNC: 17 MG/DL — SIGNIFICANT CHANGE UP (ref 7–23)
CALCIUM SERPL-MCNC: 7.9 MG/DL — LOW (ref 8.4–10.5)
CHLORIDE SERPL-SCNC: 95 MMOL/L — LOW (ref 96–108)
CO2 SERPL-SCNC: 25 MMOL/L — SIGNIFICANT CHANGE UP (ref 22–31)
CREAT SERPL-MCNC: 0.4 MG/DL — LOW (ref 0.5–1.3)
EGFR: 96 ML/MIN/1.73M2 — SIGNIFICANT CHANGE UP
GLUCOSE BLDC GLUCOMTR-MCNC: 239 MG/DL — HIGH (ref 70–99)
GLUCOSE BLDC GLUCOMTR-MCNC: 74 MG/DL — SIGNIFICANT CHANGE UP (ref 70–99)
GLUCOSE SERPL-MCNC: 57 MG/DL — LOW (ref 70–99)
POTASSIUM SERPL-MCNC: 4.4 MMOL/L — SIGNIFICANT CHANGE UP (ref 3.5–5.3)
POTASSIUM SERPL-SCNC: 4.4 MMOL/L — SIGNIFICANT CHANGE UP (ref 3.5–5.3)
SODIUM SERPL-SCNC: 132 MMOL/L — LOW (ref 135–145)

## 2024-07-09 PROCEDURE — 87637 SARSCOV2&INF A&B&RSV AMP PRB: CPT

## 2024-07-09 PROCEDURE — 84443 ASSAY THYROID STIM HORMONE: CPT

## 2024-07-09 PROCEDURE — 84480 ASSAY TRIIODOTHYRONINE (T3): CPT

## 2024-07-09 PROCEDURE — 36415 COLL VENOUS BLD VENIPUNCTURE: CPT

## 2024-07-09 PROCEDURE — 80053 COMPREHEN METABOLIC PANEL: CPT

## 2024-07-09 PROCEDURE — 93306 TTE W/DOPPLER COMPLETE: CPT

## 2024-07-09 PROCEDURE — 93005 ELECTROCARDIOGRAM TRACING: CPT

## 2024-07-09 PROCEDURE — 71045 X-RAY EXAM CHEST 1 VIEW: CPT

## 2024-07-09 PROCEDURE — 84100 ASSAY OF PHOSPHORUS: CPT

## 2024-07-09 PROCEDURE — 85025 COMPLETE CBC W/AUTO DIFF WBC: CPT

## 2024-07-09 PROCEDURE — 80048 BASIC METABOLIC PNL TOTAL CA: CPT

## 2024-07-09 PROCEDURE — 83036 HEMOGLOBIN GLYCOSYLATED A1C: CPT

## 2024-07-09 PROCEDURE — 84436 ASSAY OF TOTAL THYROXINE: CPT

## 2024-07-09 PROCEDURE — 82962 GLUCOSE BLOOD TEST: CPT

## 2024-07-09 PROCEDURE — 99285 EMERGENCY DEPT VISIT HI MDM: CPT

## 2024-07-09 PROCEDURE — 96374 THER/PROPH/DIAG INJ IV PUSH: CPT

## 2024-07-09 PROCEDURE — 93970 EXTREMITY STUDY: CPT

## 2024-07-09 PROCEDURE — 83735 ASSAY OF MAGNESIUM: CPT

## 2024-07-09 PROCEDURE — 85027 COMPLETE CBC AUTOMATED: CPT

## 2024-07-09 PROCEDURE — 84484 ASSAY OF TROPONIN QUANT: CPT

## 2024-07-09 PROCEDURE — 83880 ASSAY OF NATRIURETIC PEPTIDE: CPT

## 2024-07-09 RX ORDER — CADEXOMER IODINE 0.9 %
1 GEL (GRAM) TOPICAL
Qty: 1 | Refills: 0
Start: 2024-07-09 | End: 2024-08-07

## 2024-07-09 RX ORDER — METOPROLOL TARTRATE 50 MG
0.5 TABLET ORAL
Refills: 0 | DISCHARGE

## 2024-07-09 RX ORDER — ACETAMINOPHEN 325 MG
2 TABLET ORAL
Qty: 0 | Refills: 0 | DISCHARGE
Start: 2024-07-09

## 2024-07-09 RX ORDER — METOPROLOL TARTRATE 50 MG
1 TABLET ORAL
Qty: 0 | Refills: 0 | DISCHARGE
Start: 2024-07-09

## 2024-07-09 RX ORDER — FUROSEMIDE 10 MG/ML
1 INJECTION, SOLUTION INTRAMUSCULAR; INTRAVENOUS
Qty: 30 | Refills: 0
Start: 2024-07-09 | End: 2024-08-07

## 2024-07-09 RX ADMIN — Medication 1 CAPSULE(S): at 08:41

## 2024-07-09 RX ADMIN — PANTOPRAZOLE SODIUM 40 MILLIGRAM(S): 40 INJECTION, POWDER, FOR SOLUTION INTRAVENOUS at 06:32

## 2024-07-09 RX ADMIN — LORATADINE 10 MILLIGRAM(S): 10 TABLET ORAL at 08:41

## 2024-07-09 RX ADMIN — MEMANTINE HYDROCHLORIDE 10 MILLIGRAM(S): 7 CAPSULE, EXTENDED RELEASE ORAL at 06:32

## 2024-07-09 RX ADMIN — FUROSEMIDE 20 MILLIGRAM(S): 10 INJECTION, SOLUTION INTRAMUSCULAR; INTRAVENOUS at 06:32

## 2024-07-09 RX ADMIN — TRAMADOL HYDROCHLORIDE 25 MILLIGRAM(S): 50 TABLET, FILM COATED ORAL at 14:40

## 2024-07-09 RX ADMIN — Medication 1 APPLICATION(S): at 14:40

## 2024-07-09 RX ADMIN — INSULIN LISPRO 2: 100 INJECTION, SOLUTION SUBCUTANEOUS at 12:46

## 2024-07-09 RX ADMIN — TRAMADOL HYDROCHLORIDE 25 MILLIGRAM(S): 50 TABLET, FILM COATED ORAL at 06:32

## 2024-07-09 RX ADMIN — Medication 37.5 MILLIGRAM(S): at 06:32

## 2024-07-09 RX ADMIN — Medication 20 MILLIGRAM(S): at 08:42

## 2024-07-09 RX ADMIN — SERTRALINE HYDROCHLORIDE 25 MILLIGRAM(S): 100 TABLET, FILM COATED ORAL at 08:41

## 2024-07-09 RX ADMIN — Medication 25 MILLIGRAM(S): at 06:32

## 2024-07-09 RX ADMIN — ASPIRIN 81 MILLIGRAM(S): 325 TABLET, FILM COATED ORAL at 08:41

## 2024-07-09 RX ADMIN — APIXABAN 2.5 MILLIGRAM(S): 5 TABLET, FILM COATED ORAL at 06:32

## 2024-07-09 NOTE — DISCHARGE NOTE PROVIDER - HOSPITAL COURSE
HPI:  85 y/o female w/ PMHx CAD s/p PCI, IDDM2, 2/2 Whipple procedure for pancreatic CA, dementia (baseline AAOx1-2, bedbound, nonverbal) presents via EMS today for 8-10 days worth of  LE swelling. On 2L NC, baseline no oxygen. Caretaker notes that pt has b/l LE swelling, upper b/l arms R>L have been more swollen than usual, and possible abdominal swelling. Notes that pt occasionally has episodes that seems like pt is wheezing for air. Pt was sent into today for by PCP after son in law called 911 after abnormal lab results with a BNP on 6/28 of 2826. Pt denies PND, orthopnea, dysuria, rashes, trauma, dietary changes, trauma, recent travel or infectious contacts, hx of smoking, asthma, copd. (02 Jul 2024 17:53)    Hospital Course:  Patient came in after being found to have an elevated BNP on outpatient labs performed by her PCP. She is on 2L NC at baseline and family endorsed increased BL LE swelling. BNP in ER was 2014. CXR showed bibasilar atelectasis. Cards was consulted and patient was started on IV Lasix. She was transitioned to PO Lasix when able to tolerate. The patient was monitored on tele and was found to have  PATs. Cards recc BB. Patient was also able to tolerate RA. BL LE VA duplex revealed LLE DVT. The patient was started on Eliquis inpatient, and ordered to continue as an outpatient. Copay $0 per [facility].  Neuro was also called to assess patient's confusion/agitation. Patient has dementia at baseline, neuro signed off.  Wound care consulted to care for decubitus of patient. Wound care reccs were made as well as outpatient follow up.  Palliative was consulted to discuss GOC with patient's family, who wish for her to remain full code at this time.  The patient was medically cleared for dc with outpatient follow up and resumption of her 24/7 home care, as she is bedbound and has dementia at baseline.    Important Medication Changes and Reason:  Please see medication reconciliation.  ELIQUIS ** NEW MED    Active or Pending Issues Requiring Follow-up:  Please follow up with primary care, neuro, cards, and wound care.    Advanced Directives:   [X] Full code  [ ] DNR  [ ] Hospice    Discharge Diagnoses:  CHF exacerbation  DVT  Dementia  Pressure Injury HPI:  85 y/o female w/ PMHx CAD s/p PCI, IDDM2, 2/2 Whipple procedure for pancreatic CA, dementia (baseline AAOx1-2, bedbound, nonverbal) presents via EMS today for 8-10 days worth of  LE swelling. On 2L NC, baseline no oxygen. Caretaker notes that pt has b/l LE swelling, upper b/l arms R>L have been more swollen than usual, and possible abdominal swelling. Notes that pt occasionally has episodes that seems like pt is wheezing for air. Pt was sent into today for by PCP after son in law called 911 after abnormal lab results with a BNP on 6/28 of 2826. Pt denies PND, orthopnea, dysuria, rashes, trauma, dietary changes, trauma, recent travel or infectious contacts, hx of smoking, asthma, copd. (02 Jul 2024 17:53)    Hospital Course:  Patient came in after being found to have an elevated BNP on outpatient labs performed by her PCP. She is on 2L NC at baseline and family endorsed increased BL LE swelling. BNP in ER was 2014. CXR showed bibasilar atelectasis. Cards was consulted and patient was started on IV Lasix. She was transitioned to PO Lasix when able to tolerate. The patient was monitored on tele and was found to have  PATs. Cards recc BB. Patient was also able to tolerate RA. BL LE VA duplex revealed LLE DVT. The patient was started on Eliquis inpatient, and ordered to continue as an outpatient. Copay $0 per [facility].  Neuro was also called to assess patient's confusion/agitation. Patient has dementia at baseline, neuro signed off.  Wound care consulted to care for decubitus of patient. Wound care reccs were made as well as outpatient follow up.  Palliative was consulted to discuss GOC with patient's family, who wish for her to remain full code at this time.  The patient was medically cleared for dc with outpatient follow up and resumption of her 24/7 home care, as she is bedbound and has dementia at baseline.    Important Medication Changes and Reason:  Please see medication reconciliation.    Active or Pending Issues Requiring Follow-up:  Please follow up with primary care, neuro, cards, and wound care.    Advanced Directives:   [X] Full code  [ ] DNR  [ ] Hospice    Discharge Diagnoses:  CHF exacerbation  DVT  Dementia  Pressure Injury

## 2024-07-09 NOTE — DISCHARGE NOTE PROVIDER - CARE PROVIDER_API CALL
ARNALDO SANDOVAL  [address] [place] [id number]  Phone: [phone]  Fax: [phone]  Established Patient  Follow Up Time: 1 week    Wade Phelps  Cardiovascular Disease  [address] [facility] [address]  [place] [address]  Phone: [phone]  Fax: [phone]  Follow Up Time: 1 week    Perri Sharif  Neurology  [address] [place] [id number]  Phone: [phone]  Fax: [phone]  Follow Up Time: 1 week

## 2024-07-09 NOTE — PROGRESS NOTE ADULT - NUTRITIONAL ASSESSMENT
This patient has been assessed with a concern for Malnutrition and has been determined to have a diagnosis/diagnoses of Severe protein-calorie malnutrition.    This patient is being managed with:   Diet Minced and Moist-  Kosher  Supplement Feeding Modality:  Oral  Glucerna Shake Cans or Servings Per Day:  2       Frequency:  Daily  Entered: Jul 8 2024  4:56PM  
This patient has been assessed with a concern for Malnutrition and has been determined to have a diagnosis/diagnoses of Severe protein-calorie malnutrition.    This patient is being managed with:   Diet Minced and Moist-  Kosher  Supplement Feeding Modality:  Oral  Glucerna Shake Cans or Servings Per Day:  2       Frequency:  Daily  Entered: Jul 8 2024  4:56PM  
This patient has been assessed with a concern for Malnutrition and has been determined to have a diagnosis/diagnoses of Severe protein-calorie malnutrition.    This patient is being managed with:   Diet Minced and Moist-  Kosher  Entered: Jul  3 2024  3:00AM  

## 2024-07-09 NOTE — DISCHARGE NOTE PROVIDER - CARE PROVIDERS DIRECT ADDRESSES
,DirectAddress_Unknown,[email or web address],DirectAddress_Unknown

## 2024-07-09 NOTE — DISCHARGE NOTE PROVIDER - NSDCFUADDAPPT_GEN_ALL_CORE_FT
APPTS ARE READY TO BE MADE: [X] YES    Best Family or Patient Contact (if needed):    Additional Information about above appointments (if needed):    1: Please follow up with primary care.  2: Please follow up with cardiology.  3: Please follow up with neurology.  4: Please follow up with wound care - [address] [place] [address] [phone].    Other comments or requests:    APPTS ARE READY TO BE MADE: [X] YES    Best Family or Patient Contact (if needed):    Additional Information about above appointments (if needed):    1: Please follow up with primary care.  2: Please follow up with cardiology.  3: Please follow up with neurology.  4: Please follow up with wound care - [address] [place] [address] [phone].    Other comments or requests:   Patient is being discharged to rehab/hospice. Caregiver will arrange follow up.[email or web address]

## 2024-07-09 NOTE — DISCHARGE NOTE PROVIDER - PROVIDER TOKENS
PROVIDER:[TOKEN:[[id number]],FOLLOWUP:[1 week],ESTABLISHEDPATIENT:[T]],PROVIDER:[TOKEN:[[id number]],FOLLOWUP:[1 week]],PROVIDER:[TOKEN:[[id number]],FOLLOWUP:[1 week]]

## 2024-07-09 NOTE — CHART NOTE - NSCHARTNOTEFT_GEN_A_CORE
Request from Dr. Young to facilitate patient discharge. Medication reconciliation reviewed, revised, and resolved with Dr. Young, who has medically cleared patient for discharge with follow up as advised. Please refer to discharge note for detailed hospital course.

## 2024-07-09 NOTE — PROGRESS NOTE ADULT - ASSESSMENT
83 y/o female w/ PMHx CAD s/p PCI, IDDM2, 2/2 Whipple procedure for pancreatic CA, dementia (baseline AAOx1-2, bedbound, nonverbal) presents via EMS today for 8-10 days worth of  LE swelling. On 2L NC, baseline no oxygen. Caretaker notes that pt has b/l LE swelling, upper b/l arms R>L have been more swollen than usual, and possible abdominal swelling. Notes that pt occasionally has episodes that seems like pt is wheezing for air. Pt was sent into on 7/2  by PCP after son in law called 911 after abnormal lab results with a BNP on 6/28 of 2826. Pt denies PND, orthopnea, dysuria, rashes, trauma, dietary changes, trauma, recent travel or infectious contacts, hx of smoking, asthma, copd.    LE edema  acute on chronic diastolic CHF  Multiple sacral decubiti  IDDM  severe malnutrition    - wound care , card, and neuro called. input appreciated  - IV LAsix w strict Is and Os  - check TTE: mod MR, AI, AS, TR  - palliaitive care to discuss GOC  - Ptn is bed bound w muliple decubiti ulcers  DVT ppx Lovenox sc
83 y/o female w/ PMHx CAD s/p PCI, IDDM2, 2/2 Whipple procedure for pancreatic CA, dementia (baseline AAOx1-2, bedbound, nonverbal) presents via EMS today for 8-10 days worth of  LE swelling. On 2L NC, baseline no oxygen. Caretaker notes that pt has b/l LE swelling, upper b/l arms R>L have been more swollen than usual, and possible abdominal swelling. Notes that pt occasionally has episodes that seems like pt is wheezing for air. Pt was sent into on 7/2  by PCP after son in law called 911 after abnormal lab results with a BNP on 6/28 of 2826. Pt denies PND, orthopnea, dysuria, rashes, trauma, dietary changes, trauma, recent travel or infectious contacts, hx of smoking, asthma, copd.    LE edema  acute on chronic diastolic CHF  Multiple sacral decubiti  IDDM  severe malnutrition    - wound care , card, and neuro called. input appreciated  - s/p IV LAsix , switch to Po Lasix in am  - check TTE: mod MR, AI, AS, TR  - s/p palliaitive care re GOC: ptn remains full code as per daughters wishes  - Ptn is bed bound w muliple decubiti ulcers, not infected. wound care following  DVT ppx Lovenox sc
  A/p  83 y/o female w/ PMHx CAD s/p PCI, IDDM2, 2/2 Whipple procedure for pancreatic CA, dementia (baseline AAOx1-2, bedbound, nonverbal) presents via EMS today for 8-10 days worth of LE swelling.    #LE Swelling-DVT  -le dopplers +  for left pop DVT  -cont AC  -sp iv lasix  -Can resume 20mg po lasix qd   -ECHO is a poor study, AS diff to quantify, at least moderate    #PAT  -Brief PATs  -Replete electrolytes prn  -Continue bb as ordered     #CAD sp PCI  -HST mild elevation - peaked at 64  -ECG SR no acute ischemic findings  -Cont asa    #AMS  -Infectious w/u per med        dvt ppx  
  A/p  83 y/o female w/ PMHx CAD s/p PCI, IDDM2, 2/2 Whipple procedure for pancreatic CA, dementia (baseline AAOx1-2, bedbound, nonverbal) presents via EMS today for 8-10 days worth of LE swelling.    #LE Swelling-DVT  -le dopplers +  for left pop DVT  -cont AC  -sp iv lasix  -Continue 20mg po lasix qd   -ECHO is a poor study, AS diff to quantify, at least moderate    #PAT  -Brief PATs  -Replete electrolytes prn  -Continue bb as ordered     #CAD sp PCI  -HST mild elevation - peaked at 64  -ECG SR no acute ischemic findings  -Cont asa    #AMS  -Infectious w/u per med        dvt ppx  
  A/p  85 y/o female w/ PMHx CAD s/p PCI, IDDM2, 2/2 Whipple procedure for pancreatic CA, dementia (baseline AAOx1-2, bedbound, nonverbal) presents via EMS today for 8-10 days worth of LE swelling.    #LE Swelling-DVT  -Trace le edema b/l  -le dopplers +  for left pop DVT  -cont AC  -Can continue iv lasix as ordered, however may not be helpful i/s/o low albumin  -Cont to trend renal fxn/electrolytes while on diuretic  -ECHO is a poor study, AS diff to quantify, at least moderate      #CAD sp PCI  -HST mild elevation - peaked at 64  -ECG SR no acute ischemic findings  -Cont asa    #AMS  -Infectious w/u per med        dvt ppx    35 minutes spent on total encounter; more than 50% of the visit was spent counseling and/or coordinating care by the attending physician.  
  A/p  85 y/o female w/ PMHx CAD s/p PCI, IDDM2, 2/2 Whipple procedure for pancreatic CA, dementia (baseline AAOx1-2, bedbound, nonverbal) presents via EMS today for 8-10 days worth of LE swelling.    #LE Swelling-DVT  -Trace le edema b/l  -le dopplers +  for left pop DVT  -cont AC  -Cont iv lasix - consider transition to po tomorrow  -Cont to trend renal fxn/electrolytes while on diuretic  -ECHO is a poor study, AS diff to quantify, at least moderate    #PAT  -Brief PATs  -REPLETE K+  -Continue bb as ordered     #CAD sp PCI  -HST mild elevation - peaked at 64  -ECG SR no acute ischemic findings  -Cont asa    #AMS  -Infectious w/u per med        dvt ppx  
  A/p  85 y/o female w/ PMHx CAD s/p PCI, IDDM2, 2/2 Whipple procedure for pancreatic CA, dementia (baseline AAOx1-2, bedbound, nonverbal) presents via EMS today for 8-10 days worth of LE swelling.    #LE Swelling-DVT  -le dopplers +  for left pop DVT  -cont AC  -BMP noted- STOP IVP LASIX - likely resume lasix at 20 mg PO  tomorrow pending labs   -supp k  -ECHO is a poor study, AS diff to quantify, at least moderate    #PAT  -Brief PATs  -REPLETE K+  -Continue bb as ordered     #CAD sp PCI  -HST mild elevation - peaked at 64  -ECG SR no acute ischemic findings  -Cont asa    #AMS  -Infectious w/u per med        dvt ppx  
85 y/o female w/ PMHx CAD s/p PCI, IDDM2, 2/2 Whipple procedure for pancreatic CA, dementia (baseline AAOx1-2, bedbound, nonverbal) presents via EMS today for 8-10 days worth of  LE swelling. On 2L NC, baseline no oxygen. Caretaker notes that pt has b/l LE swelling, upper b/l arms R>L have been more swollen than usual, and possible abdominal swelling. Notes that pt occasionally has episodes that seems like pt is wheezing for air. Pt was sent into on 7/2  by PCP after son in law called 911 after abnormal lab results with a BNP on 6/28 of 2826. Pt denies PND, orthopnea, dysuria, rashes, trauma, dietary changes, trauma, recent travel or infectious contacts, hx of smoking, asthma, copd.    LE edema  acute on chronic diastolic CHF  Multiple sacral decubiti  IDDM  severe malnutrition    - wound care , card, and neuro called. input appreciated  - IV LAsix w strict Is and Os  - check TTE: mod MR, AI, AS, TR  - palliaitive care to discuss GOC  - Ptn is bed bound w muliple decubiti ulcers  DVT ppx Lovenox sc
  A/p  83 y/o female w/ PMHx CAD s/p PCI, IDDM2, 2/2 Whipple procedure for pancreatic CA, dementia (baseline AAOx1-2, bedbound, nonverbal) presents via EMS today for 8-10 days worth of LE swelling.    #LE Swelling-DVT  -le dopplers +  for left pop DVT  -cont AC  -Cont iv lasix - transition to po tomorrow  -Cont to trend renal fxn/electrolytes while on diuretic  -ECHO is a poor study, AS diff to quantify, at least moderate    #PAT  -Brief PATs  -REPLETE K+  -Continue bb as ordered     #CAD sp PCI  -HST mild elevation - peaked at 64  -ECG SR no acute ischemic findings  -Cont asa    #AMS  -Infectious w/u per med        dvt ppx  
83 y/o female w/ PMHx CAD s/p PCI, IDDM2, 2/2 Whipple procedure for pancreatic CA, dementia (baseline AAOx1-2, bedbound, nonverbal) presents via EMS today for 8-10 days worth of  LE swelling. On 2L NC, baseline no oxygen. Caretaker notes that pt has b/l LE swelling, upper b/l arms R>L have been more swollen than usual, and possible abdominal swelling. Notes that pt occasionally has episodes that seems like pt is wheezing for air. Pt was sent into on 7/2  by PCP after son in law called 911 after abnormal lab results with a BNP on 6/28 of 2826. Pt denies PND, orthopnea, dysuria, rashes, trauma, dietary changes, trauma, recent travel or infectious contacts, hx of smoking, asthma, copd.    LE edema  acute on chronic diastolic CHF  Multiple sacral decubiti  IDDM  severe malnutrition    - wound care , card, and neuro called. input appreciated  - IV LAsix w strict Is and Os  - check TTE: mod MR, AI, AS, TR  - palliaitive care to discuss GOC  - Ptn is bed bound w muliple decubiti ulcers  DVT ppx Lovenox sc
83 y/o female w/ PMHx CAD s/p PCI, IDDM2, 2/2 Whipple procedure for pancreatic CA, dementia (baseline AAOx1-2, bedbound, nonverbal) presents via EMS today for 8-10 days worth of  LE swelling. On 2L NC, baseline no oxygen. Caretaker notes that pt has b/l LE swelling, upper b/l arms R>L have been more swollen than usual, and possible abdominal swelling. Notes that pt occasionally has episodes that seems like pt is wheezing for air. Pt was sent into on 7/2  by PCP after son in law called 911 after abnormal lab results with a BNP on 6/28 of 2826. Pt denies PND, orthopnea, dysuria, rashes, trauma, dietary changes, trauma, recent travel or infectious contacts, hx of smoking, asthma, copd.    LE edema  acute on chronic diastolic CHF  Multiple sacral decubiti  IDDM  severe malnutrition    - wound care , card, and neuro called. input appreciated  - IV LAsix w strict Is and Os  - check TTE: mod MR, AI, AS, TR  - palliaitive care to discuss GOC: ptn remains full code as per daughters wishes  - Ptn is bed bound w muliple decubiti ulcers, not infected. wound care following  DVT ppx Lovenox sc
83 y/o female w/ PMHx CAD s/p PCI, IDDM2, 2/2 Whipple procedure for pancreatic CA, dementia (baseline AAOx1-2, bedbound, nonverbal) presents via EMS today for 8-10 days worth of  LE swelling. On 2L NC, baseline no oxygen. Caretaker notes that pt has b/l LE swelling, upper b/l arms R>L have been more swollen than usual, and possible abdominal swelling. Notes that pt occasionally has episodes that seems like pt is wheezing for air. Pt was sent into on 7/2  by PCP after son in law called 911 after abnormal lab results with a BNP on 6/28 of 2826. Pt denies PND, orthopnea, dysuria, rashes, trauma, dietary changes, trauma, recent travel or infectious contacts, hx of smoking, asthma, copd.    LE edema  acute on chronic diastolic CHF  Multiple sacral decubiti  IDDM  severe malnutrition  DVT, distal, left LE    - wound care , card, and neuro following. input appreciated  - s/p IV LAsix , switched to Po Lasix on 7/8  - distal LLE DVT, on ELiquis  - check TTE: mod MR, AI, AS, TR  - s/p palliaitive care re GOC: ptn remains full code as per daughters wishes  - Ptn is bed bound w muliple decubiti ulcers, not infected. wound care following  DVT ppx Lovenox sc
85 y/o female w/ PMHx CAD s/p PCI, IDDM2, 2/2 Whipple procedure for pancreatic CA, dementia (baseline AAOx1-2, bedbound, nonverbal) presents via EMS today for 8-10 days worth of  LE swelling. On 2L NC, baseline no oxygen. Caretaker notes that pt has b/l LE swelling, upper b/l arms R>L have been more swollen than usual, and possible abdominal swelling. Notes that pt occasionally has episodes that seems like pt is wheezing for air. Pt was sent into on 7/2  by PCP after son in law called 911 after abnormal lab results with a BNP on 6/28 of 2826. Pt denies PND, orthopnea, dysuria, rashes, trauma, dietary changes, trauma, recent travel or infectious contacts, hx of smoking, asthma, copd.    LE edema  acute on chronic diastolic CHF  Multiple sacral decubiti  IDDM  severe malnutrition  DVT, distal, left LE    - wound care , card, and neuro following. input appreciated  - s/p IV LAsix , switched to Po Lasix on 7/8  - distal LLE DVT, on ELiquis  - check TTE: mod MR, AI, AS, TR  - s/p palliaitive care re GOC: ptn remains full code as per daughters wishes  - Ptn is bed bound w muliple decubiti ulcers, not infected. wound care following  DVT ppx Lovenox sc  dc home w home care
83 y/o female w/ PMHx CAD s/p PCI, IDDM2, 2/2 Whipple procedure for pancreatic CA, dementia (baseline AAOx1-2, bedbound, nonverbal) presents via EMS today for 8-10 days worth of  LE swelling. On 2L NC, baseline no oxygen. Caretaker notes that pt has b/l LE swelling, upper b/l arms R>L have been more swollen than usual, and possible abdominal swelling. Notes that pt occasionally has episodes that seems like pt is wheezing for air. Pt was sent into on 7/2  by PCP after son in law called 911 after abnormal lab results with a BNP on 6/28 of 2826. Pt denies PND, orthopnea, dysuria, rashes, trauma, dietary changes, trauma, recent travel or infectious contacts, hx of smoking, asthma, copd. GaP consulted for complex medical decision making in the setting of serious illness and symptoms management

## 2024-07-09 NOTE — DISCHARGE NOTE NURSING/CASE MANAGEMENT/SOCIAL WORK - NSDCPEFALRISK_GEN_ALL_CORE
For information on Fall & Injury Prevention, visit: [email or web address] OR  [email or web address] OR  https://www.cdc.gov/steadi/patient.html

## 2024-07-09 NOTE — DISCHARGE NOTE PROVIDER - NSDCCPCAREPLAN_GEN_ALL_CORE_FT
PRINCIPAL DISCHARGE DIAGNOSIS  Diagnosis: Acute CHF  Assessment and Plan of Treatment: Weigh yourself daily.  If you gain 3lbs in 3 days, or 5lbs in a week call your Health Care Provider.  Do not eat or drink foods containing more than 2000mg of salt (sodium) in your diet every day.  Call your Health Care Provider if you have any swelling or increased swelling in your feet, ankles, and/or stomach.  Take all of your medication as directed.  If you become dizzy call your Health Care Provider.  Please follow up with primary care and cardiology within 1 week of discharge.        SECONDARY DISCHARGE DIAGNOSES  Diagnosis: DVT, lower extremity  Assessment and Plan of Treatment: Please take Eliquis as prescribed.  Please follow up with primary care within 1 week of discharge.  Please seek prompt medical attention for any new or worsening symptoms, including, but not limited to: leg swelling, redness, pallor, coolness, or pain.    Diagnosis: Dementia  Assessment and Plan of Treatment: Please continue medications as prescribed.  Please follow up with primary care and neurology within 1 week of discharge.    Diagnosis: Pressure injury of skin  Assessment and Plan of Treatment: Please follow wound care recommendations.  Please continue home wound care.  Please follow up with wound care within 1 week of discharge.  Please seek prompt medical attention for any signs or symptoms of wound infection, including, but not limited to: fever, redness, swelling, drainage, or pain.

## 2024-07-09 NOTE — PROGRESS NOTE ADULT - PROVIDER SPECIALTY LIST ADULT
Cardiology
Cardiology
Internal Medicine
Cardiology
Internal Medicine
Cardiology
Internal Medicine
Palliative Care

## 2024-07-09 NOTE — PROGRESS NOTE ADULT - SUBJECTIVE AND OBJECTIVE BOX
CARDIOLOGY FOLLOW UP - Dr. Phelps  DATE OF SERVICE: 7/9/24    CC  No cv events     REVIEW OF SYSTEMS:  UTO     PHYSICAL EXAM:  T(C): 36.8 (07-09-24 @ 04:27), Max: 36.9 (07-08-24 @ 20:24)  HR: 75 (07-09-24 @ 04:27) (68 - 77)  BP: 100/60 (07-09-24 @ 04:27) (90/56 - 125/76)  RR: 18 (07-09-24 @ 04:27) (18 - 18)  SpO2: 94% (07-09-24 @ 04:27) (94% - 95%)  Wt(kg): --  I&O's Summary    08 Jul 2024 07:01  -  09 Jul 2024 07:00  --------------------------------------------------------  IN: 0 mL / OUT: 300 mL / NET: -300 mL        Appearance: Elderly female	  Cardiovascular: Normal S1 S2,RRR, No JVD, No murmurs  Respiratory: Lungs clear to auscultation b/l  Gastrointestinal:  Soft, Non-tender, + BS	  Extremities: Normal range of motion, No clubbing, cyanosis or edema      Home Medications:  aspirin 81 mg oral delayed release tablet: 1 tab(s) orally once a day (02 Jul 2024 19:15)  Creon 24,000 units oral delayed release capsule: 1 cap(s) orally 2 times a day (before meals) (02 Jul 2024 19:17)  doxazosin 2 mg oral tablet: 1 tab(s) orally once a day (at bedtime) (02 Jul 2024 19:17)  famotidine 20 mg oral tablet: 1 tab(s) orally once a day (02 Jul 2024 19:17)  HumaLOG 100 units/mL subcutaneous solution: 2 unit(s) subcutaneous 3 times a day (before meals) as per sliding scale (02 Jul 2024 19:17)  Lantus 100 units/mL subcutaneous solution: 4 unit(s) subcutaneous once a day (02 Jul 2024 19:17)  loratadine 10 mg oral tablet: 1 tab(s) orally once a day (02 Jul 2024 19:14)  methenamine hippurate 1 g oral tablet: 1 tab(s) orally 2 times a day (02 Jul 2024 19:17)  metoprolol tartrate 25 mg oral tablet: 0.5 tab(s) orally 2 times a day (02 Jul 2024 19:17)  Namenda 10 mg oral tablet: 1 tab(s) orally 2 times a day (02 Jul 2024 19:17)  NexIUM 24HR 20 mg oral delayed release tablet: 1 tab(s) orally once a day (02 Jul 2024 19:14)  otc supplement(s): calcium 1g / vitamin d3 1000IU / ferrous sulfate 325mg / vitamin c 100mg / vitamin b12 1000mcg (02 Jul 2024 19:14)  QUEtiapine 25 mg oral tablet: 1 tab(s) orally 2 times a day (02 Jul 2024 19:17)  sertraline 25 mg oral tablet: 1 tab(s) orally once a day (at bedtime) (02 Jul 2024 19:17)      MEDICATIONS  (STANDING):  apixaban 2.5 milliGRAM(s) Oral every 12 hours  aspirin enteric coated 81 milliGRAM(s) Oral daily  cadexomer iodine 0.9% Gel 1 Application(s) Topical daily  dextrose 10% Bolus 125 milliLiter(s) IV Bolus once  dextrose 5%. 1000 milliLiter(s) (50 mL/Hr) IV Continuous <Continuous>  dextrose 5%. 1000 milliLiter(s) (100 mL/Hr) IV Continuous <Continuous>  dextrose 50% Injectable 25 Gram(s) IV Push once  dextrose 50% Injectable 12.5 Gram(s) IV Push once  doxazosin 2 milliGRAM(s) Oral at bedtime  famotidine    Tablet 20 milliGRAM(s) Oral daily  furosemide    Tablet 20 milliGRAM(s) Oral daily  glucagon  Injectable 1 milliGRAM(s) IntraMuscular once  insulin glargine Injectable (LANTUS) 5 Unit(s) SubCutaneous at bedtime  insulin lispro (ADMELOG) corrective regimen sliding scale   SubCutaneous three times a day before meals  insulin lispro (ADMELOG) corrective regimen sliding scale   SubCutaneous at bedtime  loratadine 10 milliGRAM(s) Oral daily  memantine 10 milliGRAM(s) Oral two times a day  metoprolol tartrate 37.5 milliGRAM(s) Oral two times a day  pancrelipase  (CREON 24,000 Lipase Units) 1 Capsule(s) Oral two times a day with meals  pantoprazole    Tablet 40 milliGRAM(s) Oral before breakfast  potassium chloride   Powder 20 milliEquivalent(s) Oral every 2 hours  povidone iodine 10% Solution 1 Application(s) Topical daily  QUEtiapine 25 milliGRAM(s) Oral two times a day  sertraline 25 milliGRAM(s) Oral daily  traMADol 25 milliGRAM(s) Oral every 8 hours      TELEMETRY: 	    ECG:  	  RADIOLOGY:   DIAGNOSTIC TESTING:  [ ] Echocardiogram:  [ ]  Catheterization:  [ ] Stress Test:    OTHER: 	    LABS:	 	    Troponin T, High Sensitivity Result: 63 ng/L [0 - 51] (07-02 @ 15:51)  Troponin T, High Sensitivity Result: 64 ng/L [0 - 51] (07-02 @ 13:57)      07-09    132<L>  |  95<L>  |  17  ----------------------------<  57<L>  4.4   |  25  |  0.40<L>    Ca    7.9<L>      09 Jul 2024 06:59  Phos  2.7     07-08  Mg     1.8     07-08

## 2024-07-09 NOTE — DISCHARGE NOTE NURSING/CASE MANAGEMENT/SOCIAL WORK - NSDCFUADDAPPT_GEN_ALL_CORE_FT
APPTS ARE READY TO BE MADE: [X] YES    Best Family or Patient Contact (if needed):    Additional Information about above appointments (if needed):    1: Please follow up with primary care.  2: Please follow up with cardiology.  3: Please follow up with neurology.  4: Please follow up with wound care - [address] [place] [address] [phone].    Other comments or requests:

## 2024-07-09 NOTE — DISCHARGE NOTE NURSING/CASE MANAGEMENT/SOCIAL WORK - PATIENT PORTAL LINK FT
You can access the FollowMyHealth Patient Portal offered by [facility] by registering at the following website: [email or web address] By joining [facility]’s FollowMyHealth portal, you will also be able to view your health information using other applications (apps) compatible with our system.

## 2024-07-09 NOTE — DISCHARGE NOTE PROVIDER - NSDCMRMEDTOKEN_GEN_ALL_CORE_FT
apixaban 2.5 mg oral tablet: 1 tab(s) orally every 12 hours  aspirin 81 mg oral delayed release tablet: 1 tab(s) orally once a day  Creon 24,000 units oral delayed release capsule: 1 cap(s) orally 2 times a day (before meals)  doxazosin 2 mg oral tablet: 1 tab(s) orally once a day (at bedtime)  famotidine 20 mg oral tablet: 1 tab(s) orally once a day  HumaLOG 100 units/mL subcutaneous solution: 2 unit(s) subcutaneous 3 times a day (before meals) as per sliding scale  Lantus 100 units/mL subcutaneous solution: 4 unit(s) subcutaneous once a day  loratadine 10 mg oral tablet: 1 tab(s) orally once a day  methenamine hippurate 1 g oral tablet: 1 tab(s) orally 2 times a day  metoprolol tartrate 25 mg oral tablet: 0.5 tab(s) orally 2 times a day  Namenda 10 mg oral tablet: 1 tab(s) orally 2 times a day  NexIUM 24HR 20 mg oral delayed release tablet: 1 tab(s) orally once a day  otc supplement(s): calcium 1g / vitamin d3 1000IU / ferrous sulfate 325mg / vitamin c 100mg / vitamin b12 1000mcg  QUEtiapine 25 mg oral tablet: 1 tab(s) orally 2 times a day  sertraline 25 mg oral tablet: 1 tab(s) orally once a day (at bedtime)   acetaminophen 325 mg oral tablet: 2 tab(s) orally every 6 hours As needed Mild Pain (1 - 3)  apixaban 2.5 mg oral tablet: 1 tab(s) orally every 12 hours  aspirin 81 mg oral delayed release tablet: 1 tab(s) orally once a day  cadexomer iodine 0.9% topical gel: Apply topically to affected area once a day apply to right heel once daily  Creon 24,000 units oral delayed release capsule: 1 cap(s) orally 2 times a day (before meals)  doxazosin 2 mg oral tablet: 1 tab(s) orally once a day (at bedtime)  famotidine 20 mg oral tablet: 1 tab(s) orally once a day  furosemide 20 mg oral tablet: 1 tab(s) orally once a day  HumaLOG 100 units/mL subcutaneous solution: 2 unit(s) subcutaneous 3 times a day (before meals) as per sliding scale  Lantus 100 units/mL subcutaneous solution: 4 unit(s) subcutaneous once a day  loratadine 10 mg oral tablet: 1 tab(s) orally once a day  methenamine hippurate 1 g oral tablet: 1 tab(s) orally 2 times a day  metoprolol tartrate 37.5 mg oral tablet: 1 tab(s) orally 2 times a day  Namenda 10 mg oral tablet: 1 tab(s) orally 2 times a day  NexIUM 24HR 20 mg oral delayed release tablet: 1 tab(s) orally once a day  otc supplement(s): calcium 1g / vitamin d3 1000IU / ferrous sulfate 325mg / vitamin c 100mg / vitamin b12 1000mcg  QUEtiapine 25 mg oral tablet: 1 tab(s) orally 2 times a day  sertraline 25 mg oral tablet: 1 tab(s) orally once a day (at bedtime)

## 2024-07-09 NOTE — PROGRESS NOTE ADULT - SUBJECTIVE AND OBJECTIVE BOX
Patient is a 84y old  Female who presents with a chief complaint of Heart failure    Per chart, pt is an 84 year old female with PMHx CAD s/p PCI, IDDM2, 2/2 Whipple procedure for pancreatic CA, dementia (baseline AAOx1-2, bedbound, nonverbal) presents via EMS today for 8-10 days worth of LE swelling. (05 Jul 2024 12:07)      SUBJECTIVE / OVERNIGHT EVENTS: dc home today w home care    MEDICATIONS  (STANDING):  apixaban 2.5 milliGRAM(s) Oral every 12 hours  aspirin enteric coated 81 milliGRAM(s) Oral daily  cadexomer iodine 0.9% Gel 1 Application(s) Topical daily  dextrose 10% Bolus 125 milliLiter(s) IV Bolus once  dextrose 5%. 1000 milliLiter(s) (50 mL/Hr) IV Continuous <Continuous>  dextrose 5%. 1000 milliLiter(s) (100 mL/Hr) IV Continuous <Continuous>  dextrose 50% Injectable 25 Gram(s) IV Push once  dextrose 50% Injectable 12.5 Gram(s) IV Push once  doxazosin 2 milliGRAM(s) Oral at bedtime  famotidine    Tablet 20 milliGRAM(s) Oral daily  furosemide    Tablet 20 milliGRAM(s) Oral daily  glucagon  Injectable 1 milliGRAM(s) IntraMuscular once  insulin glargine Injectable (LANTUS) 5 Unit(s) SubCutaneous at bedtime  insulin lispro (ADMELOG) corrective regimen sliding scale   SubCutaneous at bedtime  insulin lispro (ADMELOG) corrective regimen sliding scale   SubCutaneous three times a day before meals  loratadine 10 milliGRAM(s) Oral daily  memantine 10 milliGRAM(s) Oral two times a day  metoprolol tartrate 37.5 milliGRAM(s) Oral two times a day  pancrelipase  (CREON 24,000 Lipase Units) 1 Capsule(s) Oral two times a day with meals  pantoprazole    Tablet 40 milliGRAM(s) Oral before breakfast  potassium chloride   Powder 20 milliEquivalent(s) Oral every 2 hours  povidone iodine 10% Solution 1 Application(s) Topical daily  QUEtiapine 25 milliGRAM(s) Oral two times a day  sertraline 25 milliGRAM(s) Oral daily  traMADol 25 milliGRAM(s) Oral every 8 hours    MEDICATIONS  (PRN):  acetaminophen     Tablet .. 650 milliGRAM(s) Oral every 6 hours PRN Mild Pain (1 - 3)  dextrose Oral Gel 15 Gram(s) Oral once PRN Blood Glucose LESS THAN 70 milliGRAM(s)/deciliter      Vital Signs Last 24 Hrs  T(F): 98.3 (07-09-24 @ 16:13), Max: 98.5 (07-08-24 @ 20:24)  HR: 98 (07-09-24 @ 16:13) (75 - 98)  BP: 128/83 (07-09-24 @ 16:13) (100/60 - 128/83)  RR: 18 (07-09-24 @ 16:13) (18 - 18)  SpO2: 95% (07-09-24 @ 16:13) (94% - 95%)  Telemetry:   CAPILLARY BLOOD GLUCOSE      POCT Blood Glucose.: 239 mg/dL (09 Jul 2024 12:08)  POCT Blood Glucose.: 74 mg/dL (09 Jul 2024 07:56)  POCT Blood Glucose.: 210 mg/dL (08 Jul 2024 21:06)  POCT Blood Glucose.: 169 mg/dL (08 Jul 2024 17:16)    I&O's Summary    08 Jul 2024 07:01  -  09 Jul 2024 07:00  --------------------------------------------------------  IN: 0 mL / OUT: 300 mL / NET: -300 mL        PHYSICAL EXAM:  GENERAL: NAD, well-developed  HEAD:  Atraumatic, Normocephalic  EYES: EOMI, PERRLA, conjunctiva and sclera clear  NECK: Supple, No JVD  CHEST/LUNG: Clear to auscultation bilaterally; No wheeze  HEART: Regular rate and rhythm; No murmurs, rubs, or gallops  ABDOMEN: Soft, Nontender, Nondistended; Bowel sounds present  EXTREMITIES:  2+ Peripheral Pulses, No clubbing, cyanosis, or edema  PSYCH: AAOx3  NEUROLOGY: non-focal  SKIN: No rashes or lesions    LABS:    07-09    132<L>  |  95<L>  |  17  ----------------------------<  57<L>  4.4   |  25  |  0.40<L>    Ca    7.9<L>      09 Jul 2024 06:59  Phos  2.7     07-08  Mg     1.8     07-08            Urinalysis Basic - ( 09 Jul 2024 06:59 )    Color: x / Appearance: x / SG: x / pH: x  Gluc: 57 mg/dL / Ketone: x  / Bili: x / Urobili: x   Blood: x / Protein: x / Nitrite: x   Leuk Esterase: x / RBC: x / WBC x   Sq Epi: x / Non Sq Epi: x / Bacteria: x        RADIOLOGY & ADDITIONAL TESTS:    Imaging Personally Reviewed:    Consultant(s) Notes Reviewed:      Care Discussed with Consultants/Other Providers:

## 2024-07-09 NOTE — PROGRESS NOTE ADULT - TIME BILLING
Agree with above ACP note.  cv stable  cont current tx  cont po lasix as above
Patient seen and examined.  Agree with above ACP note.  cv stable  hold lasix  replete k   cont bb, pat noted
Agree with above ACP note.  cv stable  cont current tx  cont po lasix as above
Patient seen and examined.  Agree with above ACP note.  cv stable  cont lasix, prob transition to po flor vs sunday   replete k   cont bb, pat noted
Patient seen and examined.  Agree with above ACP note.  cv stable  cont lasix, prob transition to po flor vs sunday   replete k   cont bb, pat noted

## 2024-07-10 ENCOUNTER — TRANSCRIPTION ENCOUNTER (OUTPATIENT)
Age: 84
End: 2024-07-10

## 2024-07-18 ENCOUNTER — TRANSCRIPTION ENCOUNTER (OUTPATIENT)
Age: 84
End: 2024-07-18

## 2024-07-24 ENCOUNTER — APPOINTMENT (OUTPATIENT)
Dept: CARE COORDINATION | Facility: HOME HEALTH | Age: 84
End: 2024-07-24
Payer: MEDICARE

## 2024-07-24 VITALS
SYSTOLIC BLOOD PRESSURE: 118 MMHG | HEART RATE: 60 BPM | DIASTOLIC BLOOD PRESSURE: 60 MMHG | TEMPERATURE: 97.7 F | RESPIRATION RATE: 16 BRPM

## 2024-07-24 DIAGNOSIS — L89.610 PRESSURE ULCER OF RIGHT HEEL, UNSTAGEABLE: ICD-10-CM

## 2024-07-24 DIAGNOSIS — I50.33 ACUTE ON CHRONIC DIASTOLIC (CONGESTIVE) HEART FAILURE: ICD-10-CM

## 2024-07-24 DIAGNOSIS — F03.90 UNSPECIFIED DEMENTIA W/OUT BEHAVIORAL DISTURBANCE: ICD-10-CM

## 2024-07-24 DIAGNOSIS — L89.619 PRESSURE ULCER OF RIGHT HEEL, UNSPECIFIED STAGE: ICD-10-CM

## 2024-07-24 DIAGNOSIS — R53.2 FUNCTIONAL QUADRIPLEGIA: ICD-10-CM

## 2024-07-24 DIAGNOSIS — R32 UNSPECIFIED URINARY INCONTINENCE: ICD-10-CM

## 2024-07-24 DIAGNOSIS — K21.9 GASTRO-ESOPHAGEAL REFLUX DISEASE W/OUT ESOPHAGITIS: ICD-10-CM

## 2024-07-24 DIAGNOSIS — I82.432 ACUTE EMBOLISM AND THROMBOSIS OF LEFT POPLITEAL VEIN: ICD-10-CM

## 2024-07-24 DIAGNOSIS — I25.10 ATHEROSCLEROTIC HEART DISEASE OF NATIVE CORONARY ARTERY W/OUT ANGINA PECTORIS: ICD-10-CM

## 2024-07-24 DIAGNOSIS — Z79.01 LONG TERM (CURRENT) USE OF ANTICOAGULANTS: ICD-10-CM

## 2024-07-24 DIAGNOSIS — J30.9 ALLERGIC RHINITIS, UNSPECIFIED: ICD-10-CM

## 2024-07-24 PROCEDURE — 99495 TRANSJ CARE MGMT MOD F2F 14D: CPT

## 2024-07-24 NOTE — PHYSICAL EXAM
[No Acute Distress] : no acute distress [No Respiratory Distress] : no respiratory distress  [Clear to Auscultation] : lungs were clear to auscultation bilaterally [No Accessory Muscle Use] : no accessory muscle use [Normal Rate] : normal rate  [Regular Rhythm] : with a regular rhythm [No Edema] : there was no peripheral edema [No Extremity Clubbing/Cyanosis] : no extremity clubbing/cyanosis [Soft] : abdomen soft [Non Tender] : non-tender [Non-distended] : non-distended [Normal Bowel Sounds] : normal bowel sounds [de-identified] : frail thin elderly [de-identified] : fragile, senile skin with BLE dressing and dressing to RUE [de-identified] : Non verbal communication at this encounter, followed simple requests, i.e inhale, give me your finger. Maintained eye contact during visit.

## 2024-07-24 NOTE — PHYSICAL EXAM
[No Acute Distress] : no acute distress [No Respiratory Distress] : no respiratory distress  [Clear to Auscultation] : lungs were clear to auscultation bilaterally [No Accessory Muscle Use] : no accessory muscle use [Normal Rate] : normal rate  [Regular Rhythm] : with a regular rhythm [No Edema] : there was no peripheral edema [No Extremity Clubbing/Cyanosis] : no extremity clubbing/cyanosis [Soft] : abdomen soft [Non Tender] : non-tender [Non-distended] : non-distended [Normal Bowel Sounds] : normal bowel sounds [de-identified] : frail thin elderly [de-identified] : fragile, senile skin with BLE dressing and dressing to RUE [de-identified] : Non verbal communication at this encounter, followed simple requests, i.e inhale, give me your finger. Maintained eye contact during visit.

## 2024-07-24 NOTE — PHYSICAL EXAM
[No Acute Distress] : no acute distress [No Respiratory Distress] : no respiratory distress  [Clear to Auscultation] : lungs were clear to auscultation bilaterally [No Accessory Muscle Use] : no accessory muscle use [Normal Rate] : normal rate  [Regular Rhythm] : with a regular rhythm [No Edema] : there was no peripheral edema [No Extremity Clubbing/Cyanosis] : no extremity clubbing/cyanosis [Soft] : abdomen soft [Non Tender] : non-tender [Non-distended] : non-distended [Normal Bowel Sounds] : normal bowel sounds [de-identified] : frail thin elderly [de-identified] : fragile, senile skin with BLE dressing and dressing to RUE [de-identified] : Non verbal communication at this encounter, followed simple requests, i.e inhale, give me your finger. Maintained eye contact during visit.

## 2024-07-25 ENCOUNTER — TRANSCRIPTION ENCOUNTER (OUTPATIENT)
Age: 84
End: 2024-07-25

## 2024-07-26 PROBLEM — I25.10 CAD, MULTIPLE VESSEL: Status: ACTIVE | Noted: 2024-07-26

## 2024-07-26 PROBLEM — J30.9 ALLERGIC RHINITIS, UNSPECIFIED SEASONALITY, UNSPECIFIED TRIGGER: Status: ACTIVE | Noted: 2024-07-26

## 2024-07-26 PROBLEM — L89.610 PRESSURE INJURY OF RIGHT HEEL, UNSTAGEABLE: Status: ACTIVE | Noted: 2024-07-26

## 2024-07-26 PROBLEM — I82.432 ACUTE DEEP VEIN THROMBOSIS (DVT) OF POPLITEAL VEIN OF LEFT LOWER EXTREMITY: Status: ACTIVE | Noted: 2024-07-26

## 2024-07-26 PROBLEM — R53.2 FUNCTIONAL QUADRIPLEGIA: Status: ACTIVE | Noted: 2024-07-26

## 2024-07-26 PROBLEM — L89.619: Status: ACTIVE | Noted: 2024-07-26

## 2024-07-26 PROBLEM — I50.33 ACUTE ON CHRONIC DIASTOLIC CONGESTIVE HEART FAILURE: Status: ACTIVE | Noted: 2024-07-26

## 2024-07-26 PROBLEM — F03.90 DEMENTIA WITHOUT BEHAVIORAL DISTURBANCE, PSYCHOTIC DISTURBANCE, MOOD DISTURBANCE, OR ANXIETY, UNSPECIFIED DEMENTIA SEVERITY, UNSPECIFIED DEMENTIA TYPE: Status: ACTIVE | Noted: 2024-07-26

## 2024-07-26 PROBLEM — K21.9 CHRONIC GERD: Status: ACTIVE | Noted: 2024-07-26

## 2024-07-26 PROBLEM — Z79.01 LONG TERM (CURRENT) USE OF ANTICOAGULANTS: Status: ACTIVE | Noted: 2024-07-26

## 2024-07-26 RX ORDER — QUETIAPINE FUMARATE 25 MG/1
25 TABLET ORAL TWICE DAILY
Refills: 0 | Status: ACTIVE | COMMUNITY
Start: 2024-07-26

## 2024-07-26 RX ORDER — FAMOTIDINE 20 MG/1
20 TABLET, FILM COATED ORAL DAILY
Refills: 0 | Status: ACTIVE | COMMUNITY
Start: 2024-07-26

## 2024-07-26 RX ORDER — LORATADINE 10 MG/1
10 TABLET ORAL DAILY
Refills: 0 | Status: ACTIVE | COMMUNITY
Start: 2024-07-26

## 2024-07-26 RX ORDER — DOXAZOSIN 2 MG/1
2 TABLET ORAL AT BEDTIME
Refills: 0 | Status: ACTIVE | COMMUNITY
Start: 2024-07-26

## 2024-07-26 RX ORDER — FUROSEMIDE 20 MG/1
20 TABLET ORAL DAILY
Refills: 0 | Status: ACTIVE | COMMUNITY
Start: 2024-07-26

## 2024-07-26 RX ORDER — SILVER 2" X 2"
0.9 BANDAGE TOPICAL
Refills: 0 | Status: ACTIVE | COMMUNITY
Start: 2024-07-26

## 2024-07-26 RX ORDER — ASPIRIN ENTERIC COATED TABLETS 81 MG 81 MG/1
81 TABLET, DELAYED RELEASE ORAL DAILY
Refills: 0 | Status: ACTIVE | COMMUNITY
Start: 2024-07-26

## 2024-07-26 RX ORDER — APIXABAN 2.5 MG/1
2.5 TABLET, FILM COATED ORAL
Refills: 0 | Status: ACTIVE | COMMUNITY
Start: 2024-07-26

## 2024-07-26 RX ORDER — MEMANTINE HYDROCHLORIDE 10 MG/1
10 TABLET, FILM COATED ORAL TWICE DAILY
Refills: 0 | Status: ACTIVE | COMMUNITY
Start: 2024-07-26

## 2024-07-26 RX ORDER — ACETAMINOPHEN 325 MG/1
325 TABLET ORAL EVERY 6 HOURS
Refills: 0 | Status: ACTIVE | COMMUNITY
Start: 2024-07-26

## 2024-07-26 RX ORDER — ESOMEPRAZOLE MAGNESIUM 40 MG/1
40 CAPSULE, DELAYED RELEASE ORAL DAILY
Refills: 0 | Status: ACTIVE | COMMUNITY
Start: 2024-07-26

## 2024-07-26 RX ORDER — METHENAMINE HIPPURATE 1 G/1
1 TABLET ORAL TWICE DAILY
Refills: 0 | Status: ACTIVE | COMMUNITY
Start: 2024-07-26

## 2024-07-30 ENCOUNTER — TRANSCRIPTION ENCOUNTER (OUTPATIENT)
Age: 84
End: 2024-07-30

## 2024-08-05 ENCOUNTER — NON-APPOINTMENT (OUTPATIENT)
Age: 84
End: 2024-08-05

## 2024-08-08 ENCOUNTER — NON-APPOINTMENT (OUTPATIENT)
Age: 84
End: 2024-08-08